# Patient Record
Sex: FEMALE | Race: WHITE | Employment: UNEMPLOYED | URBAN - METROPOLITAN AREA
[De-identification: names, ages, dates, MRNs, and addresses within clinical notes are randomized per-mention and may not be internally consistent; named-entity substitution may affect disease eponyms.]

---

## 2017-05-04 ENCOUNTER — TELEPHONE (OUTPATIENT)
Dept: OBGYN CLINIC | Facility: CLINIC | Age: 42
End: 2017-05-04

## 2017-05-04 NOTE — TELEPHONE ENCOUNTER
PN records received via fax from [address] [place] [address]. As of today pt is 16w6d with an JEANETTE of 10/13/17. When pt calls back, she needs to be scheduled for OBN ed appt. PN records placed in pile in old triage room.

## 2017-05-06 NOTE — TELEPHONE ENCOUNTER
The pt set up her OBN appt for 5/11/17. The pt is 17 week pregnant and says that she is having sharp pains at the top of abdomin. Please advise.

## 2017-05-06 NOTE — TELEPHONE ENCOUNTER
Pt has an OBN appt scheduled 5/11/17. Pt stating that sheis 17 week pregnant and says that she is having sharp pains at the top of her abdomen. Denies cramping, bleeding, nausea, vomiting and fever.   Informed pt to go to the ED for evaluation and f/u on

## 2017-05-11 ENCOUNTER — NURSE ONLY (OUTPATIENT)
Dept: OBGYN CLINIC | Facility: CLINIC | Age: 42
End: 2017-05-11

## 2017-05-11 DIAGNOSIS — Z34.92 ENCOUNTER FOR SUPERVISION OF NORMAL PREGNANCY IN SECOND TRIMESTER, UNSPECIFIED GRAVIDITY: Primary | ICD-10-CM

## 2017-05-11 NOTE — PROGRESS NOTES
Pt seen for OBN appt today with no complaints. Pt transferring care at 17 weeks with records. Pt conceived via IVF, will have records sent. 1 hour glucose ordered. NPN appt with MD scheduled. PN labs entered and reviewed by Shannan Dexter RN. listed above No    Previous miscarriages or stillborn Yes Pt has had multiple miscarriages and ectopic

## 2017-05-15 ENCOUNTER — INITIAL PRENATAL (OUTPATIENT)
Dept: OBGYN CLINIC | Facility: CLINIC | Age: 42
End: 2017-05-15

## 2017-05-15 VITALS — DIASTOLIC BLOOD PRESSURE: 63 MMHG | SYSTOLIC BLOOD PRESSURE: 101 MMHG | WEIGHT: 150 LBS | HEART RATE: 84 BPM

## 2017-05-15 DIAGNOSIS — Z34.92 ENCOUNTER FOR SUPERVISION OF NORMAL PREGNANCY IN SECOND TRIMESTER, UNSPECIFIED GRAVIDITY: Primary | ICD-10-CM

## 2017-05-15 PROBLEM — O34.219 PREVIOUS CESAREAN DELIVERY AFFECTING PREGNANCY (HCC): Status: ACTIVE | Noted: 2017-05-15

## 2017-05-15 PROBLEM — O09.529 AMA (ADVANCED MATERNAL AGE) MULTIGRAVIDA 35+: Status: ACTIVE | Noted: 2017-05-15

## 2017-05-15 PROBLEM — O09.529 AMA (ADVANCED MATERNAL AGE) MULTIGRAVIDA 35+ (HCC): Status: ACTIVE | Noted: 2017-05-15

## 2017-05-15 PROBLEM — O34.219 PREVIOUS CESAREAN DELIVERY AFFECTING PREGNANCY: Status: ACTIVE | Noted: 2017-05-15

## 2017-05-15 RX ORDER — PRENATAL VIT/IRON FUM/FOLIC AC 27MG-0.8MG
1 TABLET ORAL DAILY
COMMUNITY
End: 2019-02-28 | Stop reason: ALTCHOICE

## 2017-05-15 RX ORDER — PROGESTERONE 100 MG/1
INSERT VAGINAL
COMMUNITY
Start: 2017-03-02 | End: 2017-05-15

## 2017-05-15 RX ORDER — ESTRADIOL 0.1 MG/D
FILM, EXTENDED RELEASE TRANSDERMAL
COMMUNITY
Start: 2017-03-09 | End: 2017-05-15

## 2017-05-15 NOTE — PROGRESS NOTES
Devon at visit. She stays at home for care of [employer] with extensive travel. They had PGD and then NT / Frankie Dimas with previous practice. We discussed AM > 40 and also IVF with MFM testing.  She would prefer to do this in [facility].

## 2017-05-17 ENCOUNTER — TELEPHONE (OUTPATIENT)
Dept: PERINATAL CARE | Facility: HOSPITAL | Age: 42
End: 2017-05-17

## 2017-05-17 ENCOUNTER — TELEPHONE (OUTPATIENT)
Dept: OBGYN CLINIC | Facility: CLINIC | Age: 42
End: 2017-05-17

## 2017-05-17 NOTE — TELEPHONE ENCOUNTER
ARI. REQUEST HAS BEEN FAXED TO [facility]. PT NEEDS TO CALL THEM [phone] TO SCHEDULE HER APPT.

## 2017-05-17 NOTE — TELEPHONE ENCOUNTER
Patient needs [facility] MFM consult, Level 2, Fetal Echo and serial MFM growth US's. Diagnoses = AMA and IVF pregnancy. She will need to see MFM by 20 weeks ( end of next week ). Thanks.

## 2017-05-24 ENCOUNTER — APPOINTMENT (OUTPATIENT)
Dept: LAB | Facility: HOSPITAL | Age: 42
End: 2017-05-24
Attending: OBSTETRICS & GYNECOLOGY
Payer: COMMERCIAL

## 2017-05-24 DIAGNOSIS — Z34.92 ENCOUNTER FOR SUPERVISION OF NORMAL PREGNANCY IN SECOND TRIMESTER, UNSPECIFIED GRAVIDITY: ICD-10-CM

## 2017-05-24 PROCEDURE — 36415 COLL VENOUS BLD VENIPUNCTURE: CPT

## 2017-05-24 PROCEDURE — 82950 GLUCOSE TEST: CPT

## 2017-05-31 ENCOUNTER — HOSPITAL ENCOUNTER (OUTPATIENT)
Dept: PERINATAL CARE | Facility: HOSPITAL | Age: 42
Discharge: HOME | End: 2017-05-31
Attending: OBSTETRICS & GYNECOLOGY
Payer: COMMERCIAL

## 2017-05-31 VITALS
RESPIRATION RATE: 16 BRPM | DIASTOLIC BLOOD PRESSURE: 60 MMHG | HEART RATE: 77 BPM | WEIGHT: 150 LBS | SYSTOLIC BLOOD PRESSURE: 112 MMHG

## 2017-05-31 DIAGNOSIS — O09.812 PREGNANCY RESULTING FROM IN VITRO FERTILIZATION IN SECOND TRIMESTER: ICD-10-CM

## 2017-05-31 DIAGNOSIS — O09.522 AMA (ADVANCED MATERNAL AGE) MULTIGRAVIDA 35+, SECOND TRIMESTER: ICD-10-CM

## 2017-05-31 DIAGNOSIS — O34.219 PREVIOUS CESAREAN DELIVERY AFFECTING PREGNANCY: ICD-10-CM

## 2017-05-31 DIAGNOSIS — O09.522 AMA (ADVANCED MATERNAL AGE) MULTIGRAVIDA 35+, SECOND TRIMESTER: Primary | ICD-10-CM

## 2017-05-31 PROCEDURE — 76811 OB US DETAILED SNGL FETUS: CPT | Performed by: OBSTETRICS & GYNECOLOGY

## 2017-05-31 PROCEDURE — 99243 OFF/OP CNSLTJ NEW/EST LOW 30: CPT | Performed by: OBSTETRICS & GYNECOLOGY

## 2017-05-31 NOTE — PROGRESS NOTES
Pt for level 2 U/S  Hx AMA and IVF  Low risk harmony male fetus  Denies pregnancy complaints  States fetal; mvmt present

## 2017-05-31 NOTE — PROGRESS NOTES
Outpatient Maternal-Fetal Medicine Consultation    Dear Dr. Charley Valladares,    Thank you for requesting ultrasound evaluation and maternal fetal medicine consultation on your patient Francesco Gilbert.   As you are aware she is a 35year old female with a Monk p 16  Wt 150 lb (68.04 kg)  LMP 12/26/2016  General: alert and oriented,no acute distress  Abdomen: gravid, soft, non-tender  Extremities: non-tender, no edema    OBSTETRIC ULTRASOUND  A level II ultrasound was performed prior to the consultation which I int 25.    Invasive Testing  I offered invasive genetic testing (amniocentesis, chorionic villus sampling) after reviewing the diagnostic accuracy of these tests as well as the procedure associated loss rate (1:500 for genetic amniocentesis).     She ultimately

## 2017-06-23 ENCOUNTER — HOSPITAL ENCOUNTER (OUTPATIENT)
Dept: PERINATAL CARE | Facility: HOSPITAL | Age: 42
Discharge: HOME | End: 2017-06-23
Attending: OBSTETRICS & GYNECOLOGY
Payer: COMMERCIAL

## 2017-06-23 VITALS — HEART RATE: 90 BPM | DIASTOLIC BLOOD PRESSURE: 44 MMHG | SYSTOLIC BLOOD PRESSURE: 113 MMHG

## 2017-06-23 DIAGNOSIS — O09.522 AMA (ADVANCED MATERNAL AGE) MULTIGRAVIDA 35+, SECOND TRIMESTER: ICD-10-CM

## 2017-06-23 DIAGNOSIS — O34.219 PREVIOUS CESAREAN DELIVERY AFFECTING PREGNANCY: ICD-10-CM

## 2017-06-23 DIAGNOSIS — O09.812 PREGNANCY RESULTING FROM ASSISTED REPRODUCTIVE TECHNOLOGY, SECOND TRIMESTER: Primary | ICD-10-CM

## 2017-06-23 PROBLEM — O09.819 PREGNANCY RESULTING FROM ASSISTED REPRODUCTIVE TECHNOLOGY: Status: ACTIVE | Noted: 2017-06-23

## 2017-06-23 PROBLEM — O09.819 PREGNANCY RESULTING FROM ASSISTED REPRODUCTIVE TECHNOLOGY (HCC): Status: ACTIVE | Noted: 2017-06-23

## 2017-06-23 PROCEDURE — 76827 ECHO EXAM OF FETAL HEART: CPT | Performed by: OBSTETRICS & GYNECOLOGY

## 2017-06-23 PROCEDURE — 76825 ECHO EXAM OF FETAL HEART: CPT | Performed by: OBSTETRICS & GYNECOLOGY

## 2017-06-23 PROCEDURE — 93325 DOPPLER ECHO COLOR FLOW MAPG: CPT | Performed by: OBSTETRICS & GYNECOLOGY

## 2017-06-23 PROCEDURE — 99214 OFFICE O/P EST MOD 30 MIN: CPT | Performed by: OBSTETRICS & GYNECOLOGY

## 2017-06-23 NOTE — PROGRESS NOTES
Outpatient Maternal-Fetal Medicine Consultation    Dear Dr. Amparo Sosa,    Thank you for requesting ultrasound evaluation and maternal fetal medicine consultation on your patient Homero Turk As you are aware she is a 35year old female with a Monk p appears to be at increased risk of delivering offspring with congenital malformations compared with fertile women who conceive naturally. Heart defects have been reported as high at 6 % so fetal echocardiography is recommended in all IVF patients.  Nallely Rahman diagnostic accuracy of these tests as well as the procedure associated loss rate (1:500 for genetic amniocentesis). She ultimately does not desire invasive genetic testing. She had pre-implantation screening, NT screen and NIPT - declined amniocentesis.

## 2017-06-28 ENCOUNTER — ROUTINE PRENATAL (OUTPATIENT)
Dept: OBGYN CLINIC | Facility: CLINIC | Age: 42
End: 2017-06-28

## 2017-06-28 VITALS
WEIGHT: 154 LBS | DIASTOLIC BLOOD PRESSURE: 62 MMHG | SYSTOLIC BLOOD PRESSURE: 100 MMHG | BODY MASS INDEX: 28.34 KG/M2 | HEART RATE: 85 BPM | HEIGHT: 62 IN

## 2017-06-28 DIAGNOSIS — Z34.92 ENCOUNTER FOR SUPERVISION OF NORMAL PREGNANCY IN SECOND TRIMESTER, UNSPECIFIED GRAVIDITY: Primary | ICD-10-CM

## 2017-07-06 ENCOUNTER — OFFICE VISIT (OUTPATIENT)
Dept: INTERNAL MEDICINE CLINIC | Facility: CLINIC | Age: 42
End: 2017-07-06

## 2017-07-06 VITALS
TEMPERATURE: 99 F | HEIGHT: 62 IN | HEART RATE: 90 BPM | DIASTOLIC BLOOD PRESSURE: 55 MMHG | SYSTOLIC BLOOD PRESSURE: 91 MMHG | WEIGHT: 156 LBS | BODY MASS INDEX: 28.71 KG/M2

## 2017-07-06 DIAGNOSIS — R05.9 COUGH: ICD-10-CM

## 2017-07-06 DIAGNOSIS — J02.9 SORE THROAT: Primary | ICD-10-CM

## 2017-07-06 DIAGNOSIS — R09.81 SINUS CONGESTION: ICD-10-CM

## 2017-07-06 LAB
CONTROL LINE PRESENT WITH A CLEAR BACKGROUND (YES/NO): YES YES/NO
KIT LOT #: NORMAL NUMERIC
STREP GRP A CUL-SCR: NEGATIVE

## 2017-07-06 PROCEDURE — 99212 OFFICE O/P EST SF 10 MIN: CPT | Performed by: INTERNAL MEDICINE

## 2017-07-06 PROCEDURE — 87880 STREP A ASSAY W/OPTIC: CPT | Performed by: INTERNAL MEDICINE

## 2017-07-06 PROCEDURE — 99203 OFFICE O/P NEW LOW 30 MIN: CPT | Performed by: INTERNAL MEDICINE

## 2017-07-06 RX ORDER — MOMETASONE 50 UG/1
1 SPRAY, METERED NASAL DAILY
Qty: 1 BOTTLE | Refills: 0 | Status: SHIPPED | OUTPATIENT
Start: 2017-07-06 | End: 2017-08-21

## 2017-07-06 NOTE — PATIENT INSTRUCTIONS
1.  Take nasal spray as prescribed. 2.  Take Claritin daily for the next 7-10 days and reevaluate effectiveness. When You Have a Sore Throat  A sore throat can be painful. There are many reasons why you may have a sore throat.  Your healthcare provider wi the exam, your healthcare provider checks your ears, nose, and throat for problems.  He or she also checks for swelling in the neck, and may listen to your chest.  Possible tests  Other tests your healthcare provider may perform include:  · A throat swab to produce bacteria that are harder to kill. Your healthcare provider will prescribe antibiotics only if he or she thinks they are likely to help. If antibiotics are prescribed  Take the medicine exactly as directed.  Be sure to finish your prescription even

## 2017-07-06 NOTE — PROGRESS NOTES
Patient ID: Henny Tello is a 38year old female. Patient presents with:  Sore Throat: Prenatal pt complains of Throat pain that began 11 days ago, requesting a strep test.       HISTORY OF PRESENT ILLNESS:   HPI  Patient presents for above.   Having co Concern   None on file     Social History Narrative   None on file           PHYSICAL EXAM:      07/06/17  1541   BP: 91/55   Pulse: 90   Temp: 98.5 °F (36.9 °C)   TempSrc: Oral   Weight: 156 lb (70.8 kg)   Height: 5' 2\" (1.575 m)       Body mass index is daily.  Dispense: 1 Bottle; Refill: 0  · As per #1. Return in about 1 week (around 7/13/2017).     Cheli Marion MD  7/6/2017

## 2017-07-19 ENCOUNTER — TELEPHONE (OUTPATIENT)
Dept: INTERNAL MEDICINE CLINIC | Facility: CLINIC | Age: 42
End: 2017-07-19

## 2017-07-19 ENCOUNTER — TELEPHONE (OUTPATIENT)
Dept: PEDIATRICS CLINIC | Facility: CLINIC | Age: 42
End: 2017-07-19

## 2017-07-19 NOTE — TELEPHONE ENCOUNTER
Reviewed Dr Jose A Jarvis orders with pt. She has already done all the home remedies without improvement. She scheduled appt with Dr Estevan Kaplan on 7/20/17 @ 3:20pm [facility] for further evaluation.

## 2017-07-19 NOTE — TELEPHONE ENCOUNTER
Patient can take Tylenol for the pain. Also salt water gargles. She can drink hot tea with honey. Make appointment if not better. Hesitate to use any other prescription medication based on pregnancy and lack of evidence of bacterial infection.

## 2017-07-19 NOTE — TELEPHONE ENCOUNTER
LEFT MESSAGE THAT THE TESTS SHOULD BE DONE AT 28 WEEKS WHICH IS 2 DAYS FROM NOW SO REASSURED HER ANYTIME THIS WEEK IS PERFECT.

## 2017-07-19 NOTE — TELEPHONE ENCOUNTER
Actions Requested: Further recommendations/treatments. To on call for Dr. Kiah Pike  Problem: sore throat, congestion  Onset and Timing: 3 weeks   Associated Symptoms: Pt reports sore throat, congestion. Denies sinus pain or fever  Aggravating by:   Alleviated

## 2017-07-20 ENCOUNTER — OFFICE VISIT (OUTPATIENT)
Dept: INTERNAL MEDICINE CLINIC | Facility: CLINIC | Age: 42
End: 2017-07-20

## 2017-07-20 VITALS
HEART RATE: 80 BPM | HEIGHT: 62 IN | DIASTOLIC BLOOD PRESSURE: 59 MMHG | RESPIRATION RATE: 18 BRPM | WEIGHT: 156.38 LBS | BODY MASS INDEX: 28.78 KG/M2 | TEMPERATURE: 99 F | SYSTOLIC BLOOD PRESSURE: 100 MMHG

## 2017-07-20 DIAGNOSIS — J02.9 PHARYNGITIS, UNSPECIFIED ETIOLOGY: Primary | ICD-10-CM

## 2017-07-20 PROCEDURE — 99212 OFFICE O/P EST SF 10 MIN: CPT | Performed by: INTERNAL MEDICINE

## 2017-07-20 PROCEDURE — 99213 OFFICE O/P EST LOW 20 MIN: CPT | Performed by: INTERNAL MEDICINE

## 2017-07-20 RX ORDER — AZITHROMYCIN 250 MG/1
TABLET, FILM COATED ORAL
Qty: 6 TABLET | Refills: 0 | Status: SHIPPED | OUTPATIENT
Start: 2017-07-20 | End: 2017-08-21 | Stop reason: ALTCHOICE

## 2017-07-20 NOTE — PROGRESS NOTES
HPI:    Patient ID: Getachew Kunz is a 38year old female. She has been sick for 4 weeks. She saw Dr. Kristi Atwood and was given Flonase. She didn't think it was helping. URI    This is a new problem. The current episode started 1 to 4 weeks ago.  The p

## 2017-07-21 ENCOUNTER — TELEPHONE (OUTPATIENT)
Dept: OBGYN CLINIC | Facility: CLINIC | Age: 42
End: 2017-07-21

## 2017-07-21 NOTE — TELEPHONE ENCOUNTER
Pt notified Z pack is category B med and it is okay to take during pregnancy. Pt states she had one episode of diarrhea 3 hours after she took the first dose. Pt advised to notify her PCP if diarrhea continues.

## 2017-07-24 ENCOUNTER — APPOINTMENT (OUTPATIENT)
Dept: LAB | Facility: HOSPITAL | Age: 42
End: 2017-07-24
Attending: OBSTETRICS & GYNECOLOGY
Payer: COMMERCIAL

## 2017-07-24 ENCOUNTER — HOSPITAL ENCOUNTER (OUTPATIENT)
Dept: PERINATAL CARE | Facility: HOSPITAL | Age: 42
Discharge: HOME | End: 2017-07-24
Attending: OBSTETRICS & GYNECOLOGY
Payer: COMMERCIAL

## 2017-07-24 VITALS — SYSTOLIC BLOOD PRESSURE: 106 MMHG | HEART RATE: 82 BPM | DIASTOLIC BLOOD PRESSURE: 47 MMHG

## 2017-07-24 DIAGNOSIS — O09.813 PREGNANCY RESULTING FROM ASSISTED REPRODUCTIVE TECHNOLOGY, THIRD TRIMESTER: ICD-10-CM

## 2017-07-24 DIAGNOSIS — Z34.92 ENCOUNTER FOR SUPERVISION OF NORMAL PREGNANCY IN SECOND TRIMESTER, UNSPECIFIED GRAVIDITY: ICD-10-CM

## 2017-07-24 DIAGNOSIS — O09.523 AMA (ADVANCED MATERNAL AGE) MULTIGRAVIDA 35+, THIRD TRIMESTER: ICD-10-CM

## 2017-07-24 DIAGNOSIS — O09.523 AMA (ADVANCED MATERNAL AGE) MULTIGRAVIDA 35+, THIRD TRIMESTER: Primary | ICD-10-CM

## 2017-07-24 LAB
ERYTHROCYTE [DISTWIDTH] IN BLOOD BY AUTOMATED COUNT: 13.6 % (ref 11–15)
GLUCOSE 1H P 50 G GLC PO SERPL-MCNC: 123 MG/DL
HCT VFR BLD AUTO: 37 % (ref 35–48)
HGB BLD-MCNC: 12.5 G/DL (ref 12–16)
MCH RBC QN AUTO: 32.2 PG (ref 27–32)
MCHC RBC AUTO-ENTMCNC: 33.7 G/DL (ref 32–37)
MCV RBC AUTO: 95.4 FL (ref 80–100)
PLATELET # BLD AUTO: 208 K/UL (ref 140–400)
PMV BLD AUTO: 9.2 FL (ref 7.4–10.3)
RBC # BLD AUTO: 3.87 M/UL (ref 3.7–5.4)
WBC # BLD AUTO: 8.5 K/UL (ref 4–11)

## 2017-07-24 PROCEDURE — 99213 OFFICE O/P EST LOW 20 MIN: CPT | Performed by: OBSTETRICS & GYNECOLOGY

## 2017-07-24 PROCEDURE — 76805 OB US >/= 14 WKS SNGL FETUS: CPT | Performed by: OBSTETRICS & GYNECOLOGY

## 2017-07-24 PROCEDURE — 85027 COMPLETE CBC AUTOMATED: CPT

## 2017-07-24 PROCEDURE — 36415 COLL VENOUS BLD VENIPUNCTURE: CPT

## 2017-07-24 PROCEDURE — 82950 GLUCOSE TEST: CPT

## 2017-07-24 NOTE — PROGRESS NOTES
Outpatient Maternal-Fetal Medicine Consultation    Dear Dr. Milena Nixon,    Thank you for requesting ultrasound evaluation and maternal fetal medicine consultation on your patient Aarti Lucas As you are aware she is a 35year old female with a Monk p   · AMA    RECOMMENDATIONS:  · Continue care with Dr. Amparo Sosa  · Monthly Growth ultrasounds   · Weekly NST's at 32 weeks  · Twice weekly testing at 36 weeks  · weekly NST and weekly BPP or  · twice weekly NST with weekly BHANU  · Delivery at 39 week

## 2017-07-24 NOTE — PROGRESS NOTES
Pt here for level 2 ultrasound  AMA  Hx IVF  Denies pregnancy complaints and states feeling fetal movement

## 2017-07-28 ENCOUNTER — ROUTINE PRENATAL (OUTPATIENT)
Dept: OBGYN CLINIC | Facility: CLINIC | Age: 42
End: 2017-07-28

## 2017-07-28 VITALS
SYSTOLIC BLOOD PRESSURE: 105 MMHG | BODY MASS INDEX: 29 KG/M2 | HEART RATE: 91 BPM | WEIGHT: 157.81 LBS | DIASTOLIC BLOOD PRESSURE: 63 MMHG

## 2017-07-28 DIAGNOSIS — Z34.93 ENCOUNTER FOR SUPERVISION OF NORMAL PREGNANCY IN THIRD TRIMESTER, UNSPECIFIED GRAVIDITY: Primary | ICD-10-CM

## 2017-07-28 LAB
MULTISTIX LOT#: NORMAL NUMERIC
PH, URINE: 7 (ref 4.5–8)
SPECIFIC GRAVITY: 1.01 (ref 1–1.03)
UROBILINOGEN,SEMI-QN: 0.2 MG/DL (ref 0–1.9)

## 2017-07-28 NOTE — PROGRESS NOTES
Had episode of epigastric pain that was relieved with BM (diarrhea last night). No issues since. Growth 71%. RTC 2 wks.

## 2017-08-08 ENCOUNTER — TELEPHONE (OUTPATIENT)
Dept: OBGYN CLINIC | Facility: CLINIC | Age: 42
End: 2017-08-08

## 2017-08-14 ENCOUNTER — ROUTINE PRENATAL (OUTPATIENT)
Dept: OBGYN CLINIC | Facility: CLINIC | Age: 42
End: 2017-08-14

## 2017-08-14 VITALS
DIASTOLIC BLOOD PRESSURE: 57 MMHG | HEART RATE: 86 BPM | BODY MASS INDEX: 29 KG/M2 | WEIGHT: 158.81 LBS | SYSTOLIC BLOOD PRESSURE: 103 MMHG

## 2017-08-14 DIAGNOSIS — Z34.93 ENCOUNTER FOR SUPERVISION OF NORMAL PREGNANCY IN THIRD TRIMESTER, UNSPECIFIED GRAVIDITY: Primary | ICD-10-CM

## 2017-08-14 NOTE — PROGRESS NOTES
Wishes for rCSx now -- uncertain re: which exact date since prefers M[facility]NEZ (friend recommendation). Undecided re: BTL. Has appt for MFM next week for next growth. Has appts for NSTs already set up.  RTC 2 wks

## 2017-08-16 ENCOUNTER — TELEPHONE (OUTPATIENT)
Dept: PEDIATRICS CLINIC | Facility: CLINIC | Age: 42
End: 2017-08-16

## 2017-08-16 NOTE — TELEPHONE ENCOUNTER
Pt requesting to speak directly to MART[facility]NEZ. Pt informed MARTÍNEZ is out of clinic this week and offered to be of any assistance to pt. Pt states she will talk to MARTÍNEZ at her next PN appt. Assisted pt with scheduling appt with MARTÍNEZ on 8/29/17.

## 2017-08-16 NOTE — TELEPHONE ENCOUNTER
Pt states she has a few questions regarding her upcoming appts want to speak to dr before olga are rescheduled

## 2017-08-21 ENCOUNTER — HOSPITAL ENCOUNTER (OUTPATIENT)
Dept: PERINATAL CARE | Facility: HOSPITAL | Age: 42
Discharge: HOME | End: 2017-08-21
Attending: OBSTETRICS & GYNECOLOGY
Payer: COMMERCIAL

## 2017-08-21 VITALS — SYSTOLIC BLOOD PRESSURE: 119 MMHG | HEART RATE: 82 BPM | DIASTOLIC BLOOD PRESSURE: 64 MMHG

## 2017-08-21 DIAGNOSIS — O09.523 ELDERLY MULTIGRAVIDA IN THIRD TRIMESTER: ICD-10-CM

## 2017-08-21 DIAGNOSIS — O09.813 PREGNANCY RESULTING FROM ASSISTED REPRODUCTIVE TECHNOLOGY IN THIRD TRIMESTER: Primary | ICD-10-CM

## 2017-08-21 DIAGNOSIS — O09.813 PREGNANCY RESULTING FROM ASSISTED REPRODUCTIVE TECHNOLOGY IN THIRD TRIMESTER: ICD-10-CM

## 2017-08-21 PROCEDURE — 76805 OB US >/= 14 WKS SNGL FETUS: CPT | Performed by: OBSTETRICS & GYNECOLOGY

## 2017-08-21 PROCEDURE — 76819 FETAL BIOPHYS PROFIL W/O NST: CPT | Performed by: OBSTETRICS & GYNECOLOGY

## 2017-08-21 PROCEDURE — 99212 OFFICE O/P EST SF 10 MIN: CPT | Performed by: OBSTETRICS & GYNECOLOGY

## 2017-08-21 NOTE — PROGRESS NOTES
Pt here for growth ultrasound and BPP  AMA   IVF  Hx of anxiety  Hx of fibroids  Denies pregnancy complaints and states feeling fetal movement  C/o diarrhea x three this week

## 2017-08-21 NOTE — PROGRESS NOTES
Outpatient Maternal-Fetal Medicine Consultation    Dear Dr. Tony Mcknight,    Thank you for requesting ultrasound evaluation and maternal fetal medicine consultation on your patient Lear Ha As you are aware she is a 35year old female with a Monk p with dates  · IVF with PGS  · H/o   · AMA    RECOMMENDATIONS:  · Continue care with Dr. Dianna Zaragoza  · Monthly Growth ultrasounds   · Weekly NST's   · Twice weekly testing at 36 weeks  · weekly NST and weekly BPP or  · twice weekly NST with weekly BHANU

## 2017-08-21 NOTE — ADDENDUM NOTE
Encounter addended by: Tanika Dumont on: 8/21/2017 12:24 PM<BR>    Actions taken: Charge Capture section accepted

## 2017-08-23 ENCOUNTER — TELEPHONE (OUTPATIENT)
Dept: OBGYN CLINIC | Facility: CLINIC | Age: 42
End: 2017-08-23

## 2017-08-24 ENCOUNTER — APPOINTMENT (OUTPATIENT)
Dept: OBGYN CLINIC | Facility: HOSPITAL | Age: 42
End: 2017-08-24
Payer: COMMERCIAL

## 2017-08-24 ENCOUNTER — HOSPITAL ENCOUNTER (OUTPATIENT)
Facility: HOSPITAL | Age: 42
Discharge: HOME | End: 2017-08-24
Attending: OBSTETRICS & GYNECOLOGY | Admitting: OBSTETRICS & GYNECOLOGY
Payer: COMMERCIAL

## 2017-08-24 VITALS — SYSTOLIC BLOOD PRESSURE: 114 MMHG | DIASTOLIC BLOOD PRESSURE: 46 MMHG | HEART RATE: 93 BPM

## 2017-08-24 PROCEDURE — 59025 FETAL NON-STRESS TEST: CPT | Performed by: OBSTETRICS & GYNECOLOGY

## 2017-08-24 NOTE — NST
Nonstress Test   Patient: Henny Tello    Gestation: 32w6d    NST: AMA       Variability: Moderate           Accelerations: Yes           Decelerations: None            Baseline: 130 BPM           Uterine Irritability: No           Contractions: Not pres

## 2017-08-29 ENCOUNTER — ROUTINE PRENATAL (OUTPATIENT)
Dept: OBGYN CLINIC | Facility: CLINIC | Age: 42
End: 2017-08-29

## 2017-08-29 VITALS
DIASTOLIC BLOOD PRESSURE: 62 MMHG | BODY MASS INDEX: 29 KG/M2 | WEIGHT: 159.19 LBS | HEART RATE: 81 BPM | SYSTOLIC BLOOD PRESSURE: 104 MMHG

## 2017-08-29 DIAGNOSIS — Z34.93 ENCOUNTER FOR SUPERVISION OF NORMAL PREGNANCY IN THIRD TRIMESTER, UNSPECIFIED GRAVIDITY: Primary | ICD-10-CM

## 2017-08-29 LAB
MULTISTIX LOT#: NORMAL NUMERIC
PH, URINE: 7 (ref 4.5–8)
SPECIFIC GRAVITY: 1 (ref 1–1.03)
UROBILINOGEN,SEMI-QN: 0 MG/DL (ref 0–1.9)

## 2017-08-29 PROCEDURE — 90715 TDAP VACCINE 7 YRS/> IM: CPT | Performed by: OBSTETRICS & GYNECOLOGY

## 2017-08-29 PROCEDURE — 90471 IMMUNIZATION ADMIN: CPT | Performed by: OBSTETRICS & GYNECOLOGY

## 2017-08-29 NOTE — PROGRESS NOTES
Tdap vaccine administered to pts left deltoid. Pt tolerated well. VIS sheet in regards to tdap given to pt and encouraged to call with any questions or concerns.

## 2017-08-30 ENCOUNTER — TELEPHONE (OUTPATIENT)
Dept: OBGYN CLINIC | Facility: CLINIC | Age: 42
End: 2017-08-30

## 2017-08-30 DIAGNOSIS — Z01.818 PRE-OP TESTING: Primary | ICD-10-CM

## 2017-08-30 NOTE — TELEPHONE ENCOUNTER
OB GYN SURGICAL SCHEDULING    Assessment: Previous  section    Pre-Operative Procedure:  Repeat     Side: neither    In / on: 10-10-17 afternoon- prefer 1300    Date:  10-10-17    Admission:  AM Admit    Anesthesia: Spinal    Additional Or

## 2017-08-30 NOTE — TELEPHONE ENCOUNTER
Please enter the appropriate orders for Patient is scheduled 10/10/17 at 1:30pm MART[facility]NEZ/Norman Stinson rp .

## 2017-08-30 NOTE — TELEPHONE ENCOUNTER
Lmtcb. Please relay info:    Patient is scheduled 10/10/17 at 1:30pm repeat  Dr. Iman Wu rpajith requested. Pat orders routed. Instructions routed via [employer].

## 2017-09-01 ENCOUNTER — APPOINTMENT (OUTPATIENT)
Dept: OBGYN CLINIC | Facility: HOSPITAL | Age: 42
End: 2017-09-01
Payer: COMMERCIAL

## 2017-09-01 ENCOUNTER — HOSPITAL ENCOUNTER (OUTPATIENT)
Facility: HOSPITAL | Age: 42
Discharge: HOME | End: 2017-09-01
Attending: OBSTETRICS & GYNECOLOGY | Admitting: OBSTETRICS & GYNECOLOGY
Payer: COMMERCIAL

## 2017-09-01 PROCEDURE — 59025 FETAL NON-STRESS TEST: CPT | Performed by: OBSTETRICS & GYNECOLOGY

## 2017-09-01 NOTE — NST
Nonstress Test   Patient: Gaston Duval    Gestation: 34w0d    NST:AMA, IVG       Variability: Moderate           Accelerations: Yes           Decelerations: None            Baseline: 125 BPM           Uterine Irritability: No           Contractions: Not

## 2017-09-08 ENCOUNTER — HOSPITAL ENCOUNTER (OUTPATIENT)
Dept: OBGYN CLINIC | Facility: HOSPITAL | Age: 42
Discharge: HOME | End: 2017-09-08
Payer: COMMERCIAL

## 2017-09-08 ENCOUNTER — HOSPITAL ENCOUNTER (OUTPATIENT)
Facility: HOSPITAL | Age: 42
Discharge: HOME | End: 2017-09-08
Attending: OBSTETRICS & GYNECOLOGY | Admitting: OBSTETRICS & GYNECOLOGY
Payer: COMMERCIAL

## 2017-09-08 PROCEDURE — 59025 FETAL NON-STRESS TEST: CPT | Performed by: OBSTETRICS & GYNECOLOGY

## 2017-09-08 NOTE — NST
Nonstress Test   Patient: Morgan Pair    Gestation: 35w0d    NST:ama       Variability: Moderate           Accelerations: Yes           Decelerations: None            Baseline: 135 BPM           Uterine Irritability: No           Contractions: Irregular

## 2017-09-12 ENCOUNTER — ROUTINE PRENATAL (OUTPATIENT)
Dept: OBGYN CLINIC | Facility: CLINIC | Age: 42
End: 2017-09-12

## 2017-09-12 VITALS
BODY MASS INDEX: 29 KG/M2 | DIASTOLIC BLOOD PRESSURE: 66 MMHG | HEART RATE: 87 BPM | SYSTOLIC BLOOD PRESSURE: 105 MMHG | WEIGHT: 160 LBS

## 2017-09-12 DIAGNOSIS — Z34.93 ENCOUNTER FOR SUPERVISION OF NORMAL PREGNANCY IN THIRD TRIMESTER, UNSPECIFIED GRAVIDITY: Primary | ICD-10-CM

## 2017-09-12 LAB
MULTISTIX LOT#: NORMAL NUMERIC
PH, URINE: 7.5 (ref 4.5–8)
SPECIFIC GRAVITY: 1.01 (ref 1–1.03)

## 2017-09-15 ENCOUNTER — HOSPITAL ENCOUNTER (OUTPATIENT)
Dept: OBGYN CLINIC | Facility: HOSPITAL | Age: 42
Discharge: HOME | End: 2017-09-15
Payer: COMMERCIAL

## 2017-09-15 ENCOUNTER — HOSPITAL ENCOUNTER (OUTPATIENT)
Facility: HOSPITAL | Age: 42
Discharge: HOME | End: 2017-09-15
Attending: OBSTETRICS & GYNECOLOGY | Admitting: OBSTETRICS & GYNECOLOGY
Payer: COMMERCIAL

## 2017-09-15 PROCEDURE — 59025 FETAL NON-STRESS TEST: CPT | Performed by: OBSTETRICS & GYNECOLOGY

## 2017-09-15 NOTE — NST
Nonstress Test   Patient: Benjamín Haro    Gestation: 36w0d    NST:ama, IVF     Variability: Moderate           Accelerations: Yes           Decelerations: None            Baseline: 125 BPM           Uterine Irritability: No           Contractions: Not pr

## 2017-09-18 ENCOUNTER — HOSPITAL ENCOUNTER (OUTPATIENT)
Dept: PERINATAL CARE | Facility: HOSPITAL | Age: 42
Discharge: HOME | End: 2017-09-18
Attending: OBSTETRICS & GYNECOLOGY
Payer: COMMERCIAL

## 2017-09-18 ENCOUNTER — OFFICE VISIT (OUTPATIENT)
Dept: OBGYN CLINIC | Facility: CLINIC | Age: 42
End: 2017-09-18

## 2017-09-18 ENCOUNTER — APPOINTMENT (OUTPATIENT)
Dept: LAB | Facility: HOSPITAL | Age: 42
End: 2017-09-18
Attending: OBSTETRICS & GYNECOLOGY
Payer: COMMERCIAL

## 2017-09-18 VITALS
SYSTOLIC BLOOD PRESSURE: 97 MMHG | HEART RATE: 96 BPM | DIASTOLIC BLOOD PRESSURE: 62 MMHG | WEIGHT: 160 LBS | BODY MASS INDEX: 29 KG/M2

## 2017-09-18 VITALS — DIASTOLIC BLOOD PRESSURE: 66 MMHG | HEART RATE: 92 BPM | SYSTOLIC BLOOD PRESSURE: 117 MMHG

## 2017-09-18 DIAGNOSIS — Z34.93 ENCOUNTER FOR SUPERVISION OF NORMAL PREGNANCY IN THIRD TRIMESTER, UNSPECIFIED GRAVIDITY: ICD-10-CM

## 2017-09-18 DIAGNOSIS — Z34.93 ENCOUNTER FOR SUPERVISION OF NORMAL PREGNANCY IN THIRD TRIMESTER, UNSPECIFIED GRAVIDITY: Primary | ICD-10-CM

## 2017-09-18 DIAGNOSIS — O09.813 PREGNANCY RESULTING FROM ASSISTED REPRODUCTIVE TECHNOLOGY IN THIRD TRIMESTER: ICD-10-CM

## 2017-09-18 DIAGNOSIS — O09.523 ELDERLY MULTIGRAVIDA IN THIRD TRIMESTER: Primary | ICD-10-CM

## 2017-09-18 DIAGNOSIS — O09.523 ELDERLY MULTIGRAVIDA IN THIRD TRIMESTER: ICD-10-CM

## 2017-09-18 LAB
ERYTHROCYTE [DISTWIDTH] IN BLOOD BY AUTOMATED COUNT: 13 % (ref 11–15)
HCT VFR BLD AUTO: 36.2 % (ref 35–48)
HGB BLD-MCNC: 12.1 G/DL (ref 12–16)
MCH RBC QN AUTO: 32.1 PG (ref 27–32)
MCHC RBC AUTO-ENTMCNC: 33.5 G/DL (ref 32–37)
MCV RBC AUTO: 95.9 FL (ref 80–100)
MULTISTIX LOT#: NORMAL NUMERIC
PH, URINE: 7 (ref 4.5–8)
PLATELET # BLD AUTO: 200 K/UL (ref 140–400)
PMV BLD AUTO: 9.3 FL (ref 7.4–10.3)
RBC # BLD AUTO: 3.78 M/UL (ref 3.7–5.4)
SPECIFIC GRAVITY: 1.01 (ref 1–1.03)
UROBILINOGEN,SEMI-QN: 0.2 MG/DL (ref 0–1.9)
WBC # BLD AUTO: 9.9 K/UL (ref 4–11)

## 2017-09-18 PROCEDURE — 85027 COMPLETE CBC AUTOMATED: CPT

## 2017-09-18 PROCEDURE — 86780 TREPONEMA PALLIDUM: CPT

## 2017-09-18 PROCEDURE — 76805 OB US >/= 14 WKS SNGL FETUS: CPT | Performed by: OBSTETRICS & GYNECOLOGY

## 2017-09-18 PROCEDURE — 76819 FETAL BIOPHYS PROFIL W/O NST: CPT | Performed by: OBSTETRICS & GYNECOLOGY

## 2017-09-18 PROCEDURE — 99213 OFFICE O/P EST LOW 20 MIN: CPT | Performed by: OBSTETRICS & GYNECOLOGY

## 2017-09-18 PROCEDURE — 36415 COLL VENOUS BLD VENIPUNCTURE: CPT

## 2017-09-18 NOTE — ADDENDUM NOTE
Encounter addended by: Sugar Agustin on: 9/18/2017  1:36 PM<BR>    Actions taken: Charge Capture section accepted

## 2017-09-18 NOTE — PROGRESS NOTES
Pt here for growth ultrasound and BPP  AMA  IVF  Denies pregnancy complaints and states feeling fetal movement

## 2017-09-18 NOTE — PROGRESS NOTES
Outpatient Maternal-Fetal Medicine Consultation    Dear Dr. Jaswinder Jensen,    Thank you for requesting ultrasound evaluation and maternal fetal medicine consultation on your patient Faustina Kussmaul As you are aware she is a 35year old female with a Monk p IMPRESSION:  · IUP at 36w3d   · Scan consistent with dates  · IVF with PGS  · H/o   · AMA    RECOMMENDATIONS:  · Continue care with Dr. Alli Ndiaye  · Twice weekly testing   · weekly NST and weekly BPP or  · twice weekly NST with weekly BHANU

## 2017-09-19 ENCOUNTER — HOSPITAL ENCOUNTER (OUTPATIENT)
Facility: HOSPITAL | Age: 42
Discharge: HOME | End: 2017-09-19
Attending: OBSTETRICS & GYNECOLOGY | Admitting: OBSTETRICS & GYNECOLOGY
Payer: COMMERCIAL

## 2017-09-19 ENCOUNTER — APPOINTMENT (OUTPATIENT)
Dept: OBGYN CLINIC | Facility: HOSPITAL | Age: 42
End: 2017-09-19
Payer: COMMERCIAL

## 2017-09-19 VITALS — HEART RATE: 99 BPM | SYSTOLIC BLOOD PRESSURE: 117 MMHG | DIASTOLIC BLOOD PRESSURE: 57 MMHG

## 2017-09-19 PROCEDURE — 59025 FETAL NON-STRESS TEST: CPT | Performed by: OBSTETRICS & GYNECOLOGY

## 2017-09-19 NOTE — NST
Nonstress Test   Patient: Alvino Auguste    Gestation: 36w4d    NST: AMA, IVF       Variability: Moderate           Accelerations: Yes           Decelerations: None            Baseline: 130 BPM           Uterine Irritability: No           Contractions: Not

## 2017-09-20 LAB — T PALLIDUM AB SER QL: NEGATIVE

## 2017-09-20 NOTE — TELEPHONE ENCOUNTER
Spoke to patient and referred her to [facility] for detailed instructions, patient  Advised to call the office if she needed clarification.

## 2017-09-22 ENCOUNTER — HOSPITAL ENCOUNTER (OUTPATIENT)
Dept: OBGYN CLINIC | Facility: HOSPITAL | Age: 42
Discharge: HOME | End: 2017-09-22
Payer: COMMERCIAL

## 2017-09-22 ENCOUNTER — HOSPITAL ENCOUNTER (OUTPATIENT)
Facility: HOSPITAL | Age: 42
Discharge: HOME | End: 2017-09-22
Attending: OBSTETRICS & GYNECOLOGY | Admitting: OBSTETRICS & GYNECOLOGY
Payer: COMMERCIAL

## 2017-09-22 VITALS — DIASTOLIC BLOOD PRESSURE: 44 MMHG | SYSTOLIC BLOOD PRESSURE: 115 MMHG | HEART RATE: 87 BPM

## 2017-09-22 PROCEDURE — 59025 FETAL NON-STRESS TEST: CPT | Performed by: OBSTETRICS & GYNECOLOGY

## 2017-09-22 NOTE — NST
Nonstress Test   Patient: Bryan Reyes    Gestation: 37w0d    NST:       Variability: Moderate           Accelerations: Yes           Decelerations: None            Baseline: 135 BPM           Uterine Irritability: No           Contractions: Not present

## 2017-09-24 NOTE — PROGRESS NOTES
Outpatient Maternal-Fetal Medicine Consultation     Dear Dr. Jenny Sesay,     Thank you for requesting ultrasound evaluation and maternal fetal medicine consultation on your patient Cat Moncada As you are aware she is a 35year old female with a [place] rule out all structural and chromosomal abnormalities.        We reviewed the signs and symptoms of preeclampsia, labor and monitoring fetal movement. We discussed reasons for her to call her physician.     I explained the rationale for the NSTs.     SHERINE

## 2017-09-25 ENCOUNTER — HOSPITAL ENCOUNTER (OUTPATIENT)
Dept: PERINATAL CARE | Facility: HOSPITAL | Age: 42
Discharge: HOME | End: 2017-09-25
Attending: OBSTETRICS & GYNECOLOGY
Payer: COMMERCIAL

## 2017-09-25 VITALS
HEART RATE: 92 BPM | DIASTOLIC BLOOD PRESSURE: 73 MMHG | SYSTOLIC BLOOD PRESSURE: 112 MMHG | BODY MASS INDEX: 29.44 KG/M2 | WEIGHT: 160 LBS | HEIGHT: 62 IN

## 2017-09-25 DIAGNOSIS — O09.813 PREGNANCY RESULTING FROM ASSISTED REPRODUCTIVE TECHNOLOGY IN THIRD TRIMESTER: ICD-10-CM

## 2017-09-25 DIAGNOSIS — O09.523 ELDERLY MULTIGRAVIDA IN THIRD TRIMESTER: ICD-10-CM

## 2017-09-25 DIAGNOSIS — O34.219 PREVIOUS CESAREAN DELIVERY AFFECTING PREGNANCY: ICD-10-CM

## 2017-09-25 DIAGNOSIS — O09.523 ELDERLY MULTIGRAVIDA IN THIRD TRIMESTER: Primary | ICD-10-CM

## 2017-09-25 PROCEDURE — 76819 FETAL BIOPHYS PROFIL W/O NST: CPT | Performed by: OBSTETRICS & GYNECOLOGY

## 2017-09-25 PROCEDURE — 99212 OFFICE O/P EST SF 10 MIN: CPT | Performed by: OBSTETRICS & GYNECOLOGY

## 2017-09-25 NOTE — PROGRESS NOTES
Pr here for ultrasound /BPP  AMA  IVF  Hx of anxiety   Hx of fibroids  Denies pregnancy complaints and states feeling fetal movement

## 2017-09-26 ENCOUNTER — OFFICE VISIT (OUTPATIENT)
Dept: OBGYN CLINIC | Facility: CLINIC | Age: 42
End: 2017-09-26

## 2017-09-26 ENCOUNTER — HOSPITAL ENCOUNTER (OUTPATIENT)
Facility: HOSPITAL | Age: 42
Discharge: HOME | End: 2017-09-26
Attending: OBSTETRICS & GYNECOLOGY | Admitting: OBSTETRICS & GYNECOLOGY
Payer: COMMERCIAL

## 2017-09-26 ENCOUNTER — APPOINTMENT (OUTPATIENT)
Dept: OBGYN CLINIC | Facility: HOSPITAL | Age: 42
End: 2017-09-26
Payer: COMMERCIAL

## 2017-09-26 VITALS — SYSTOLIC BLOOD PRESSURE: 125 MMHG | HEART RATE: 90 BPM | DIASTOLIC BLOOD PRESSURE: 64 MMHG

## 2017-09-26 VITALS
BODY MASS INDEX: 30 KG/M2 | SYSTOLIC BLOOD PRESSURE: 105 MMHG | DIASTOLIC BLOOD PRESSURE: 67 MMHG | WEIGHT: 162 LBS | HEART RATE: 85 BPM

## 2017-09-26 DIAGNOSIS — Z34.93 ENCOUNTER FOR SUPERVISION OF NORMAL PREGNANCY IN THIRD TRIMESTER, UNSPECIFIED GRAVIDITY: Primary | ICD-10-CM

## 2017-09-26 LAB
MULTISTIX LOT#: NORMAL NUMERIC
PH, URINE: 6.5 (ref 4.5–8)
SPECIFIC GRAVITY: 1.02 (ref 1–1.03)

## 2017-09-26 PROCEDURE — 59025 FETAL NON-STRESS TEST: CPT | Performed by: OBSTETRICS & GYNECOLOGY

## 2017-09-26 NOTE — NST
Nonstress Test   Patient: Abi Brown    Gestation: 37w4d    NST:AMA       Variability: Moderate           Accelerations: Yes           Decelerations: None            Baseline: 125 BPM           Uterine Irritability: No           Contractions: Not prese

## 2017-09-29 ENCOUNTER — HOSPITAL ENCOUNTER (OUTPATIENT)
Dept: OBGYN CLINIC | Facility: HOSPITAL | Age: 42
Discharge: HOME | End: 2017-09-29
Payer: COMMERCIAL

## 2017-09-29 ENCOUNTER — HOSPITAL ENCOUNTER (OUTPATIENT)
Facility: HOSPITAL | Age: 42
Discharge: HOME | End: 2017-09-29
Attending: OBSTETRICS & GYNECOLOGY | Admitting: OBSTETRICS & GYNECOLOGY
Payer: COMMERCIAL

## 2017-09-29 PROCEDURE — 59025 FETAL NON-STRESS TEST: CPT | Performed by: OBSTETRICS & GYNECOLOGY

## 2017-09-29 NOTE — NST
Nonstress Test   Patient: Maxx Dugan    Gestation: 38w0d    NST:       Variability: Moderate           Accelerations: Yes           Decelerations: None            Baseline: 125 BPM           Uterine Irritability: Yes           Contractions: Not present

## 2017-10-02 ENCOUNTER — HOSPITAL ENCOUNTER (OUTPATIENT)
Dept: PERINATAL CARE | Facility: HOSPITAL | Age: 42
Discharge: HOME | End: 2017-10-02
Attending: OBSTETRICS & GYNECOLOGY
Payer: COMMERCIAL

## 2017-10-02 ENCOUNTER — OFFICE VISIT (OUTPATIENT)
Dept: OBGYN CLINIC | Facility: CLINIC | Age: 42
End: 2017-10-02

## 2017-10-02 VITALS
HEIGHT: 62 IN | BODY MASS INDEX: 29.81 KG/M2 | RESPIRATION RATE: 14 BRPM | SYSTOLIC BLOOD PRESSURE: 122 MMHG | HEART RATE: 75 BPM | WEIGHT: 162 LBS | DIASTOLIC BLOOD PRESSURE: 60 MMHG

## 2017-10-02 VITALS
DIASTOLIC BLOOD PRESSURE: 66 MMHG | HEART RATE: 77 BPM | SYSTOLIC BLOOD PRESSURE: 110 MMHG | WEIGHT: 160.63 LBS | BODY MASS INDEX: 29 KG/M2

## 2017-10-02 DIAGNOSIS — Z34.93 ENCOUNTER FOR SUPERVISION OF NORMAL PREGNANCY IN THIRD TRIMESTER, UNSPECIFIED GRAVIDITY: Primary | ICD-10-CM

## 2017-10-02 DIAGNOSIS — O09.523 ELDERLY MULTIGRAVIDA IN THIRD TRIMESTER: ICD-10-CM

## 2017-10-02 DIAGNOSIS — O09.523 ELDERLY MULTIGRAVIDA IN THIRD TRIMESTER: Primary | ICD-10-CM

## 2017-10-02 DIAGNOSIS — O09.813 PREGNANCY RESULTING FROM ASSISTED REPRODUCTIVE TECHNOLOGY IN THIRD TRIMESTER: ICD-10-CM

## 2017-10-02 PROCEDURE — 90471 IMMUNIZATION ADMIN: CPT | Performed by: OBSTETRICS & GYNECOLOGY

## 2017-10-02 PROCEDURE — 90686 IIV4 VACC NO PRSV 0.5 ML IM: CPT | Performed by: OBSTETRICS & GYNECOLOGY

## 2017-10-02 PROCEDURE — 76819 FETAL BIOPHYS PROFIL W/O NST: CPT | Performed by: OBSTETRICS & GYNECOLOGY

## 2017-10-02 PROCEDURE — 99212 OFFICE O/P EST SF 10 MIN: CPT | Performed by: OBSTETRICS & GYNECOLOGY

## 2017-10-02 NOTE — PROGRESS NOTES
Outpatient Maternal-Fetal Medicine Consultation     Dear Dr. Maynor Adrian,     Thank you for requesting ultrasound evaluation and maternal fetal medicine consultation on your patient Alexa Villar As you are aware she is a 35year old female with a Marion coordination of care.  Our discussion is summarized above. The approximate physician face-to-face time was 10 minutes.

## 2017-10-03 ENCOUNTER — HOSPITAL ENCOUNTER (OUTPATIENT)
Facility: HOSPITAL | Age: 42
Discharge: HOME | End: 2017-10-03
Attending: OBSTETRICS & GYNECOLOGY | Admitting: OBSTETRICS & GYNECOLOGY
Payer: COMMERCIAL

## 2017-10-03 ENCOUNTER — APPOINTMENT (OUTPATIENT)
Dept: OBGYN CLINIC | Facility: HOSPITAL | Age: 42
End: 2017-10-03
Payer: COMMERCIAL

## 2017-10-03 VITALS — DIASTOLIC BLOOD PRESSURE: 54 MMHG | SYSTOLIC BLOOD PRESSURE: 116 MMHG | HEART RATE: 104 BPM | RESPIRATION RATE: 16 BRPM

## 2017-10-03 PROCEDURE — 59025 FETAL NON-STRESS TEST: CPT | Performed by: OBSTETRICS & GYNECOLOGY

## 2017-10-03 NOTE — NST
Nonstress Test   Patient: Alvino Auguste    Gestation: 38w4d    NST:Reactive       Variability: Moderate           Accelerations: Yes           Decelerations: None            Baseline: 135 BPM           Uterine Irritability: Yes           Contractions: Not

## 2017-10-06 ENCOUNTER — HOSPITAL ENCOUNTER (OUTPATIENT)
Facility: HOSPITAL | Age: 42
Discharge: HOME | End: 2017-10-06
Attending: OBSTETRICS & GYNECOLOGY | Admitting: OBSTETRICS & GYNECOLOGY
Payer: COMMERCIAL

## 2017-10-06 ENCOUNTER — APPOINTMENT (OUTPATIENT)
Dept: OBGYN CLINIC | Facility: HOSPITAL | Age: 42
End: 2017-10-06
Payer: COMMERCIAL

## 2017-10-06 VITALS — DIASTOLIC BLOOD PRESSURE: 48 MMHG | SYSTOLIC BLOOD PRESSURE: 129 MMHG | HEART RATE: 90 BPM

## 2017-10-06 PROCEDURE — 59025 FETAL NON-STRESS TEST: CPT | Performed by: OBSTETRICS & GYNECOLOGY

## 2017-10-09 ENCOUNTER — LAB ENCOUNTER (OUTPATIENT)
Dept: LAB | Facility: HOSPITAL | Age: 42
End: 2017-10-09
Attending: OBSTETRICS & GYNECOLOGY
Payer: COMMERCIAL

## 2017-10-09 DIAGNOSIS — Z01.818 PRE-OP TESTING: ICD-10-CM

## 2017-10-09 PROCEDURE — 81003 URINALYSIS AUTO W/O SCOPE: CPT

## 2017-10-09 PROCEDURE — 86850 RBC ANTIBODY SCREEN: CPT

## 2017-10-09 PROCEDURE — 86900 BLOOD TYPING SEROLOGIC ABO: CPT

## 2017-10-09 PROCEDURE — 86901 BLOOD TYPING SEROLOGIC RH(D): CPT

## 2017-10-09 PROCEDURE — 85025 COMPLETE CBC W/AUTO DIFF WBC: CPT

## 2017-10-09 PROCEDURE — 36415 COLL VENOUS BLD VENIPUNCTURE: CPT

## 2017-10-10 ENCOUNTER — ANESTHESIA (OUTPATIENT)
Dept: OBGYN UNIT | Facility: HOSPITAL | Age: 42
End: 2017-10-10
Payer: COMMERCIAL

## 2017-10-10 ENCOUNTER — ANESTHESIA EVENT (OUTPATIENT)
Dept: OBGYN UNIT | Facility: HOSPITAL | Age: 42
End: 2017-10-10
Payer: COMMERCIAL

## 2017-10-10 ENCOUNTER — HOSPITAL ENCOUNTER (INPATIENT)
Facility: HOSPITAL | Age: 42
LOS: 3 days | Discharge: HOME | End: 2017-10-13
Attending: OBSTETRICS & GYNECOLOGY | Admitting: OBSTETRICS & GYNECOLOGY
Payer: COMMERCIAL

## 2017-10-10 ENCOUNTER — SURGERY (OUTPATIENT)
Age: 42
End: 2017-10-10

## 2017-10-10 DIAGNOSIS — O34.219 PREVIOUS CESAREAN DELIVERY AFFECTING PREGNANCY: Primary | ICD-10-CM

## 2017-10-10 PROCEDURE — 59510 CESAREAN DELIVERY: CPT | Performed by: OBSTETRICS & GYNECOLOGY

## 2017-10-10 PROCEDURE — 59514 CESAREAN DELIVERY ONLY: CPT | Performed by: OBSTETRICS & GYNECOLOGY

## 2017-10-10 RX ORDER — AMMONIA INHALANTS 0.04 G/.3ML
0.3 INHALANT RESPIRATORY (INHALATION) AS NEEDED
Status: DISCONTINUED | OUTPATIENT
Start: 2017-10-10 | End: 2017-10-13

## 2017-10-10 RX ORDER — DOCUSATE SODIUM 100 MG/1
100 CAPSULE, LIQUID FILLED ORAL DAILY
Status: DISCONTINUED | OUTPATIENT
Start: 2017-10-10 | End: 2017-10-13

## 2017-10-10 RX ORDER — PHENYLEPHRINE HCL 10 MG/ML
VIAL (ML) INJECTION AS NEEDED
Status: DISCONTINUED | OUTPATIENT
Start: 2017-10-10 | End: 2017-10-10 | Stop reason: SURG

## 2017-10-10 RX ORDER — BUPIVACAINE HYDROCHLORIDE 7.5 MG/ML
INJECTION, SOLUTION INTRASPINAL AS NEEDED
Status: DISCONTINUED | OUTPATIENT
Start: 2017-10-10 | End: 2017-10-10 | Stop reason: SURG

## 2017-10-10 RX ORDER — SIMETHICONE 80 MG
80 TABLET,CHEWABLE ORAL 3 TIMES DAILY PRN
Status: DISCONTINUED | OUTPATIENT
Start: 2017-10-10 | End: 2017-10-13

## 2017-10-10 RX ORDER — SODIUM CHLORIDE 0.9 % (FLUSH) 0.9 %
10 SYRINGE (ML) INJECTION AS NEEDED
Status: DISCONTINUED | OUTPATIENT
Start: 2017-10-10 | End: 2017-10-13

## 2017-10-10 RX ORDER — HALOPERIDOL 5 MG/ML
0.5 INJECTION INTRAMUSCULAR ONCE AS NEEDED
Status: ACTIVE | OUTPATIENT
Start: 2017-10-10 | End: 2017-10-10

## 2017-10-10 RX ORDER — NALBUPHINE HCL 10 MG/ML
2.5 AMPUL (ML) INJECTION EVERY 4 HOURS PRN
Status: ACTIVE | OUTPATIENT
Start: 2017-10-10 | End: 2017-10-11

## 2017-10-10 RX ORDER — PRENATAL VIT,CAL 76/IRON/FOLIC 29 MG-1 MG
1 TABLET ORAL DAILY
Status: DISCONTINUED | OUTPATIENT
Start: 2017-10-10 | End: 2017-10-13

## 2017-10-10 RX ORDER — TRISODIUM CITRATE DIHYDRATE AND CITRIC ACID MONOHYDRATE 500; 334 MG/5ML; MG/5ML
30 SOLUTION ORAL ONCE
Status: COMPLETED | OUTPATIENT
Start: 2017-10-10 | End: 2017-10-10

## 2017-10-10 RX ORDER — DIPHENHYDRAMINE HYDROCHLORIDE 50 MG/ML
12.5 INJECTION INTRAMUSCULAR; INTRAVENOUS EVERY 4 HOURS PRN
Status: ACTIVE | OUTPATIENT
Start: 2017-10-10 | End: 2017-10-11

## 2017-10-10 RX ORDER — HYDROMORPHONE HYDROCHLORIDE 1 MG/ML
0.4 INJECTION, SOLUTION INTRAMUSCULAR; INTRAVENOUS; SUBCUTANEOUS
Status: ACTIVE | OUTPATIENT
Start: 2017-10-10 | End: 2017-10-11

## 2017-10-10 RX ORDER — LIDOCAINE HYDROCHLORIDE 10 MG/ML
INJECTION, SOLUTION EPIDURAL; INFILTRATION; INTRACAUDAL; PERINEURAL AS NEEDED
Status: DISCONTINUED | OUTPATIENT
Start: 2017-10-10 | End: 2017-10-10 | Stop reason: SURG

## 2017-10-10 RX ORDER — OXYCODONE AND ACETAMINOPHEN 7.5; 325 MG/1; MG/1
1 TABLET ORAL EVERY 4 HOURS PRN
Status: DISCONTINUED | OUTPATIENT
Start: 2017-10-11 | End: 2017-10-13

## 2017-10-10 RX ORDER — ACETAMINOPHEN 325 MG/1
650 TABLET ORAL EVERY 6 HOURS PRN
Status: ACTIVE | OUTPATIENT
Start: 2017-10-10 | End: 2017-10-11

## 2017-10-10 RX ORDER — KETOROLAC TROMETHAMINE 30 MG/ML
30 INJECTION, SOLUTION INTRAMUSCULAR; INTRAVENOUS EVERY 6 HOURS PRN
Status: DISPENSED | OUTPATIENT
Start: 2017-10-10 | End: 2017-10-12

## 2017-10-10 RX ORDER — ONDANSETRON 2 MG/ML
4 INJECTION INTRAMUSCULAR; INTRAVENOUS EVERY 6 HOURS PRN
Status: DISCONTINUED | OUTPATIENT
Start: 2017-10-10 | End: 2017-10-13

## 2017-10-10 RX ORDER — KETOROLAC TROMETHAMINE 30 MG/ML
30 INJECTION, SOLUTION INTRAMUSCULAR; INTRAVENOUS ONCE AS NEEDED
Status: ACTIVE | OUTPATIENT
Start: 2017-10-10 | End: 2017-10-10

## 2017-10-10 RX ORDER — DIPHENHYDRAMINE HYDROCHLORIDE 50 MG/ML
25 INJECTION INTRAMUSCULAR; INTRAVENOUS ONCE AS NEEDED
Status: ACTIVE | OUTPATIENT
Start: 2017-10-10 | End: 2017-10-10

## 2017-10-10 RX ORDER — SODIUM CHLORIDE 0.9 % (FLUSH) 0.9 %
10 SYRINGE (ML) INJECTION AS NEEDED
Status: DISCONTINUED | OUTPATIENT
Start: 2017-10-10 | End: 2017-10-10

## 2017-10-10 RX ORDER — MORPHINE SULFATE 1 MG/ML
INJECTION, SOLUTION EPIDURAL; INTRATHECAL; INTRAVENOUS AS NEEDED
Status: DISCONTINUED | OUTPATIENT
Start: 2017-10-10 | End: 2017-10-10 | Stop reason: SURG

## 2017-10-10 RX ORDER — BISACODYL 10 MG
10 SUPPOSITORY, RECTAL RECTAL
Status: DISCONTINUED | OUTPATIENT
Start: 2017-10-10 | End: 2017-10-13

## 2017-10-10 RX ORDER — ONDANSETRON 2 MG/ML
4 INJECTION INTRAMUSCULAR; INTRAVENOUS ONCE AS NEEDED
Status: ACTIVE | OUTPATIENT
Start: 2017-10-10 | End: 2017-10-10

## 2017-10-10 RX ORDER — ONDANSETRON 2 MG/ML
INJECTION INTRAMUSCULAR; INTRAVENOUS AS NEEDED
Status: DISCONTINUED | OUTPATIENT
Start: 2017-10-10 | End: 2017-10-10 | Stop reason: SURG

## 2017-10-10 RX ORDER — HYDROCODONE BITARTRATE AND ACETAMINOPHEN 7.5; 325 MG/1; MG/1
2 TABLET ORAL EVERY 6 HOURS PRN
Status: ACTIVE | OUTPATIENT
Start: 2017-10-10 | End: 2017-10-11

## 2017-10-10 RX ORDER — SODIUM CHLORIDE, SODIUM LACTATE, POTASSIUM CHLORIDE, CALCIUM CHLORIDE 600; 310; 30; 20 MG/100ML; MG/100ML; MG/100ML; MG/100ML
INJECTION, SOLUTION INTRAVENOUS CONTINUOUS
Status: DISCONTINUED | OUTPATIENT
Start: 2017-10-10 | End: 2017-10-10

## 2017-10-10 RX ORDER — NALBUPHINE HCL 10 MG/ML
2.5 AMPUL (ML) INJECTION
Status: DISCONTINUED | OUTPATIENT
Start: 2017-10-10 | End: 2017-10-13

## 2017-10-10 RX ORDER — HYDROMORPHONE HYDROCHLORIDE 1 MG/ML
0.6 INJECTION, SOLUTION INTRAMUSCULAR; INTRAVENOUS; SUBCUTANEOUS
Status: ACTIVE | OUTPATIENT
Start: 2017-10-10 | End: 2017-10-11

## 2017-10-10 RX ORDER — SODIUM CHLORIDE, SODIUM LACTATE, POTASSIUM CHLORIDE, CALCIUM CHLORIDE 600; 310; 30; 20 MG/100ML; MG/100ML; MG/100ML; MG/100ML
INJECTION, SOLUTION INTRAVENOUS
Status: DISPENSED
Start: 2017-10-10 | End: 2017-10-11

## 2017-10-10 RX ORDER — SODIUM CHLORIDE, SODIUM LACTATE, POTASSIUM CHLORIDE, CALCIUM CHLORIDE 600; 310; 30; 20 MG/100ML; MG/100ML; MG/100ML; MG/100ML
INJECTION, SOLUTION INTRAVENOUS CONTINUOUS
Status: DISCONTINUED | OUTPATIENT
Start: 2017-10-10 | End: 2017-10-13

## 2017-10-10 RX ORDER — DEXTROSE, SODIUM CHLORIDE, SODIUM LACTATE, POTASSIUM CHLORIDE, AND CALCIUM CHLORIDE 5; .6; .31; .03; .02 G/100ML; G/100ML; G/100ML; G/100ML; G/100ML
INJECTION, SOLUTION INTRAVENOUS CONTINUOUS
Status: DISCONTINUED | OUTPATIENT
Start: 2017-10-10 | End: 2017-10-13

## 2017-10-10 RX ORDER — HYDROCODONE BITARTRATE AND ACETAMINOPHEN 7.5; 325 MG/1; MG/1
1 TABLET ORAL EVERY 4 HOURS PRN
Status: DISCONTINUED | OUTPATIENT
Start: 2017-10-10 | End: 2017-10-13

## 2017-10-10 RX ORDER — NALOXONE HYDROCHLORIDE 0.4 MG/ML
0.08 INJECTION, SOLUTION INTRAMUSCULAR; INTRAVENOUS; SUBCUTANEOUS
Status: ACTIVE | OUTPATIENT
Start: 2017-10-10 | End: 2017-10-11

## 2017-10-10 RX ORDER — HYDROCODONE BITARTRATE AND ACETAMINOPHEN 7.5; 325 MG/1; MG/1
1 TABLET ORAL EVERY 6 HOURS PRN
Status: DISPENSED | OUTPATIENT
Start: 2017-10-10 | End: 2017-10-11

## 2017-10-10 RX ORDER — DIPHENHYDRAMINE HCL 25 MG
25 CAPSULE ORAL EVERY 4 HOURS PRN
Status: ACTIVE | OUTPATIENT
Start: 2017-10-10 | End: 2017-10-11

## 2017-10-10 RX ORDER — DEXAMETHASONE SODIUM PHOSPHATE 4 MG/ML
VIAL (ML) INJECTION AS NEEDED
Status: DISCONTINUED | OUTPATIENT
Start: 2017-10-10 | End: 2017-10-10 | Stop reason: SURG

## 2017-10-10 RX ORDER — KETOROLAC TROMETHAMINE 30 MG/ML
INJECTION, SOLUTION INTRAMUSCULAR; INTRAVENOUS AS NEEDED
Status: DISCONTINUED | OUTPATIENT
Start: 2017-10-10 | End: 2017-10-10 | Stop reason: SURG

## 2017-10-10 RX ADMIN — PHENYLEPHRINE HCL 200 MCG: 10 MG/ML VIAL (ML) INJECTION at 15:10:00

## 2017-10-10 RX ADMIN — BUPIVACAINE HYDROCHLORIDE 1.6 ML: 7.5 INJECTION, SOLUTION INTRASPINAL at 14:23:00

## 2017-10-10 RX ADMIN — SODIUM CHLORIDE, SODIUM LACTATE, POTASSIUM CHLORIDE, CALCIUM CHLORIDE: 600; 310; 30; 20 INJECTION, SOLUTION INTRAVENOUS at 15:36:00

## 2017-10-10 RX ADMIN — SODIUM CHLORIDE, SODIUM LACTATE, POTASSIUM CHLORIDE, CALCIUM CHLORIDE: 600; 310; 30; 20 INJECTION, SOLUTION INTRAVENOUS at 14:45:00

## 2017-10-10 RX ADMIN — PHENYLEPHRINE HCL 200 MCG: 10 MG/ML VIAL (ML) INJECTION at 14:30:00

## 2017-10-10 RX ADMIN — LIDOCAINE HYDROCHLORIDE 3 ML: 10 INJECTION, SOLUTION EPIDURAL; INFILTRATION; INTRACAUDAL; PERINEURAL at 14:19:00

## 2017-10-10 RX ADMIN — DEXAMETHASONE SODIUM PHOSPHATE 4 MG: 4 MG/ML VIAL (ML) INJECTION at 14:52:00

## 2017-10-10 RX ADMIN — MORPHINE SULFATE 0.3 MG: 1 INJECTION, SOLUTION EPIDURAL; INTRATHECAL; INTRAVENOUS at 14:23:00

## 2017-10-10 RX ADMIN — KETOROLAC TROMETHAMINE 30 MG: 30 INJECTION, SOLUTION INTRAMUSCULAR; INTRAVENOUS at 15:00:00

## 2017-10-10 RX ADMIN — ONDANSETRON 4 MG: 2 INJECTION INTRAMUSCULAR; INTRAVENOUS at 14:52:00

## 2017-10-10 RX ADMIN — SODIUM CHLORIDE, SODIUM LACTATE, POTASSIUM CHLORIDE, CALCIUM CHLORIDE: 600; 310; 30; 20 INJECTION, SOLUTION INTRAVENOUS at 14:17:00

## 2017-10-10 NOTE — PROGRESS NOTES
[facility] HOSP - [facility]     Section Delivery / Brief Operative Note    Jinx Marking Patient Status:  Inpatient    1975 MRN [id number]   Location [address]  Attending Stephen Chow, 3 [address] Opal Buck Day # 0 [facility] Am

## 2017-10-10 NOTE — ANESTHESIA POSTPROCEDURE EVALUATION
Patient: Susan Vaughan    Procedure Summary     Date:  10/10/17 Room / Location:  [address] [place] [address] L+D OR  [facility] L+D OR    Anesthesia Start:   Anesthesia Stop:      Procedure:   SECTION (N/A Abdomen) Diagnosis:  (same as preop )    Surgeon:  Jaswinder Jensen,

## 2017-10-10 NOTE — H&P
[facility]    PATIENT'S NAME: Luis Jorgensen   ATTENDING PHYSICIAN: Jai Olmstead.  Artemio Halsted, DO   PATIENT ACCOUNT#:   [de-identified]    LOCATION:    MEDICAL RECORD #:   [id number]       YOB: 1975  ADMISSION DATE:       10/10/2017    HISTORY AN  section due to failure to progress and abnormal fetal heart rate pattern on 06/10/2015. MEDICATIONS:  Prenatal vitamins 1 daily.     ALLERGIES:  Demerol which has caused rash in the past.      SOCIAL HISTORY:  The patient has never smoked, and s

## 2017-10-10 NOTE — DISCHARGE SUMMARY
[facility]    Discharge Summary    Maxx Dugan Patient Status:  Inpatient    1975 MRN [id number]   Location [address]  Attending Maynor Adrian, 3 [facility]       Admit date:  10/10/2017    Aleksey Martinez

## 2017-10-10 NOTE — ANESTHESIA PREPROCEDURE EVALUATION
Anesthesia PreOp Note    HPI:     Gaston Duval is a 38year old female who presents for preoperative consultation requested by: Mykel Wang DO    Date of Surgery: 10/10/2017    Procedure(s):   SECTION  Indication: Repeat Cesearean Section. Master Patton History Narrative   None on file       Available pre-op labs reviewed.     Lab Results  Component Value Date   WBC 10.6 10/09/2017   RBC 3.86 10/09/2017   HGB 12.5 10/09/2017   HCT 36.6 10/09/2017   MCV 94.8 10/09/2017   MCH 32.5 (H) 10/09/2017   MCHC 34.3

## 2017-10-10 NOTE — ANESTHESIA PROCEDURE NOTES
Spinal Block  Performed by: Yang Whatley by: Neyda Jackman     Patient Location:  OB  Start Time:  10/10/2017 2:19 PM  End Time:  10/10/2017 2:27 PM  Site identification: surface landmarks    Reason for Block: post-op pain manageme

## 2017-10-10 NOTE — LACTATION NOTE
LACTATION NOTE - MOTHER      Evaluation Type: Inpatient    Problems identified  Problems identified: Knowledge deficit;Milk supply WNL    Maternal history  Maternal history:  section; Infertility  Other/comment: IVF, scheduled repeat C/S    Breastfe

## 2017-10-10 NOTE — INTERVAL H&P NOTE
Pre-op Diagnosis: Repeat Cesearean Section. ..     The above referenced H&P was reviewed by Diana Branch DO on 10/10/2017, the patient was examined and no significant changes have occurred in the patient's condition since the H&P was performed.   I

## 2017-10-11 RX ORDER — IBUPROFEN 600 MG/1
600 TABLET ORAL EVERY 6 HOURS PRN
Status: DISCONTINUED | OUTPATIENT
Start: 2017-10-11 | End: 2017-10-13

## 2017-10-11 RX ORDER — ENOXAPARIN SODIUM 100 MG/ML
40 INJECTION SUBCUTANEOUS DAILY
Status: DISCONTINUED | OUTPATIENT
Start: 2017-10-11 | End: 2017-10-13

## 2017-10-11 NOTE — LACTATION NOTE
This note was copied from a baby's chart.   LACTATION NOTE - INFANT    Evaluation Type  Evaluation Type: Inpatient    Problems & Assessment  Problems Diagnosed or Identified: Tongue restriction  Problems: comment/detail: mom states that pedicatrician though minimum weight gain of 4-7 oz per week  while maintaining breast milk feeding. Written Education: Breastfeeding/pumping journal;Nipple shield guidelines;Breastfeeding suggestions for home; Outpatient lactation clinic handout; Patient Instructions  Evaluatio

## 2017-10-11 NOTE — LACTATION NOTE
LACTATION NOTE - MOTHER      Evaluation Type: Inpatient    Problems identified  Problems identified: Knowledge deficit  Problems Identified Other: reports 2 good feedings since birth with attempts    Maternal history  Maternal history:  section; Inf

## 2017-10-11 NOTE — PROGRESS NOTES
[facility] AND [facility]    Patients Name: Malina Kelechi  Attending Physician: Jaiden Roth DO  CSN: [id number]    Location:  363/363-A  MRN: [id number]    YOB: 1975  Admission Date: 10/10/2017     Obstetric Anesthesia Pain Progress Note    Po

## 2017-10-11 NOTE — LACTATION NOTE
LACTATION NOTE - MOTHER      Evaluation Type: Inpatient    Problems identified  Problems identified: Knowledge deficit    Maternal history  Maternal history:  section; Infertility  Other/comment: IVF, scheduled repeat C/S    Breastfeeding goal  [facility]

## 2017-10-11 NOTE — PLAN OF CARE
BREAST FEEDING    • Optimize infant feeding at the breast Progressing          INADEQUATE LATCH, SUCK OR SWALLOW    • Demonstrate ability to latch and sustain latch, audible swallowing and satiety Progressing          Patient Centered Care    • Patient pre

## 2017-10-11 NOTE — PROGRESS NOTES
[facility] - [facility]    OB/GYNE Progress Note      Jose Bishop Patient Status:  Inpatient    1975 MRN [id number]   Location [facility] 3SE Attending Soraida 111 Day # 1 PCP Nigel Neves MD       Subjective   Late entr   10/11/2017  11:04 AM

## 2017-10-11 NOTE — PLAN OF CARE
BREAST FEEDING    • Optimize infant feeding at the breast Progressing        Patient Centered Care    • Patient preferences are identified and integrated in the patient's plan of care Progressing        Patient/Family Goals    • Patient/Family Long Term Go

## 2017-10-11 NOTE — ANESTHESIA POST-OP FOLLOW-UP NOTE
[facility]    Patients Name: Jose Bishop  Attending Physician: Romario Izquierdo DO  CSN: [id number]    Location:  363/363-A  MRN: [id number]    YOB: 1975  Admission Date: 10/10/2017     Obstetric Anesthesia Pain Progress Note    Po

## 2017-10-11 NOTE — LACTATION NOTE
This note was copied from a baby's chart. Visible short anterior lingual frenulum appears to be restricting extension and elevation of infant tongue. Latches and suckles with nipple shield.

## 2017-10-11 NOTE — OPERATIVE REPORT
[facility]    PATIENT'S NAME: Kathy Pouch   ATTENDING PHYSICIAN: Diana Singer DO   OPERATING PHYSICIAN: Poonam Singer DO   PATIENT ACCOUNT#:   [de-identified]    LOCATION:  [address] [place] [address] #:   [id number]       DATE OF BIRTH: ruptured showing clear fluid. The uterine incision was extended bluntly. The fetal head was palpated and delivered with the aid of fundal pressure. Oropharyngeal and nasal secretions were aspirated with a bulb syringe.   The remainder of the baby was del 08:19:05  [facility] [id number]  [facility]/    cc: Parveen Barrett.  Dinesh Watkins MD

## 2017-10-11 NOTE — PROGRESS NOTES
Spoke with Dr. Shane Sanchez regarding patient. Dr. Shane Sanchez to order Lovenox on patient and two hours after injection may stop SCDs and encourage patient to ambulate.

## 2017-10-12 NOTE — PROGRESS NOTES
[facility]    OB/Gyne Post  Section Progress Note      Jordis Show Patient Status:  Inpatient    1975 MRN [id number]   Location [facility] 3SE Attending [address] [place] [address], [address] [place] [address] Day # 2 PCP Lore Wells MD

## 2017-10-12 NOTE — LACTATION NOTE
LACTATION NOTE - MOTHER      Evaluation Type: Inpatient    Problems identified  Problems identified: Knowledge deficit    Maternal history  Maternal history:  section         Maternal Assessment  Bilateral Breasts: Soft  Bilateral Nipples: Everted;

## 2017-10-12 NOTE — LACTATION NOTE
This note was copied from a baby's chart. LACTATION NOTE - INFANT    Evaluation Type  Evaluation Type: Inpatient    Problems & Assessment  Problems Diagnosed or Identified: Ankyloglossia; Tongue restriction; Latch difficulty; Shallow latch  Problems: comment

## 2017-10-13 VITALS
TEMPERATURE: 98 F | SYSTOLIC BLOOD PRESSURE: 104 MMHG | WEIGHT: 160 LBS | DIASTOLIC BLOOD PRESSURE: 58 MMHG | OXYGEN SATURATION: 97 % | HEART RATE: 81 BPM | HEIGHT: 62 IN | BODY MASS INDEX: 29.44 KG/M2 | RESPIRATION RATE: 16 BRPM

## 2017-10-13 RX ORDER — IBUPROFEN 600 MG/1
600 TABLET ORAL EVERY 6 HOURS PRN
Qty: 60 TABLET | Refills: 0 | Status: SHIPPED | OUTPATIENT
Start: 2017-10-13 | End: 2017-11-20

## 2017-10-13 RX ORDER — HYDROCODONE BITARTRATE AND ACETAMINOPHEN 7.5; 325 MG/1; MG/1
1 TABLET ORAL EVERY 4 HOURS PRN
Qty: 30 TABLET | Refills: 0 | Status: SHIPPED | OUTPATIENT
Start: 2017-10-13 | End: 2017-10-20

## 2017-10-13 RX ORDER — PSEUDOEPHEDRINE HCL 30 MG
100 TABLET ORAL DAILY
Qty: 60 CAPSULE | Refills: 0 | Status: SHIPPED | OUTPATIENT
Start: 2017-10-13 | End: 2017-11-20

## 2017-10-13 NOTE — LACTATION NOTE
LACTATION NOTE - MOTHER      Evaluation Type: Inpatient    Problems identified  Problems identified: Knowledge deficit; Nipple pain;Milk supply WNL  Problems Identified Other: Baby with TT clipped by ENT yesterday    Maternal history  Maternal history: Cesa

## 2017-10-13 NOTE — PROGRESS NOTES
Post-Partum Note   10/13/2017, 6:50 AM    Subjective:  Patient doing well. Her pain is controlled. She is ambulating without lightheadedness. She is passing flatus. She denies fevers or chills.      Objective:   10/11/17  2210 10/12/17  0900 10/12/17  1500

## 2017-10-17 ENCOUNTER — TELEPHONE (OUTPATIENT)
Dept: PEDIATRICS CLINIC | Facility: CLINIC | Age: 42
End: 2017-10-17

## 2017-10-17 NOTE — TELEPHONE ENCOUNTER
Pt had a repeat  on 10/10 and is c/o pain on left side. Pt noticed a lot of bruising in the area. Pt states that incision is not red and there is no bleeding or leaking. Pain is not relieved with pain meds but it does help.  Pt reports that MART[facility]NEZ cecilia

## 2017-10-17 NOTE — TELEPHONE ENCOUNTER
Pt states she delivered last week  And had a , is experiencing pain on the left side of incision and the outside looks a bit bruised.

## 2017-10-19 ENCOUNTER — TELEPHONE (OUTPATIENT)
Dept: OBGYN CLINIC | Facility: CLINIC | Age: 42
End: 2017-10-19

## 2017-10-19 RX ORDER — HYDROCODONE BITARTRATE AND ACETAMINOPHEN 7.5; 325 MG/1; MG/1
1 TABLET ORAL EVERY 6 HOURS PRN
Qty: 10 TABLET | Refills: 0 | Status: SHIPPED | OUTPATIENT
Start: 2017-10-19 | End: 2017-11-20

## 2017-10-19 RX ORDER — IBUPROFEN 600 MG/1
600 TABLET ORAL EVERY 6 HOURS PRN
Qty: 30 TABLET | Refills: 0 | Status: SHIPPED | OUTPATIENT
Start: 2017-10-19 | End: 2017-10-20

## 2017-10-19 NOTE — TELEPHONE ENCOUNTER
Pt reports she had  on 10/10/17 with MARTÍNEZ and requesting [facility] refill. Pt states she took a Norco pill about one hour ago and has one pill left. Pt reports she is taking Norco every 6 hours.  Pt states when the Norco is wearing off her pain gets

## 2017-10-19 NOTE — TELEPHONE ENCOUNTER
HENRYK(on call) notified of message and v/o received for Norco refill #10 tabs and ibuprofen. Pt is to alternate between Norco every 6 hrs PRN and ibuprofen every 6 hrs PRN. Pt is also to come in tomorrow morning for incision check.    Pt notified of above and

## 2017-10-20 ENCOUNTER — NURSE ONLY (OUTPATIENT)
Dept: OBGYN CLINIC | Facility: CLINIC | Age: 42
End: 2017-10-20

## 2017-10-20 VITALS — SYSTOLIC BLOOD PRESSURE: 127 MMHG | DIASTOLIC BLOOD PRESSURE: 72 MMHG | HEART RATE: 66 BPM | TEMPERATURE: 99 F

## 2017-10-20 DIAGNOSIS — Z51.89 VISIT FOR WOUND CHECK: Primary | ICD-10-CM

## 2017-10-20 NOTE — PROGRESS NOTES
Postpartum patient in today for incision check. Delivered 10-10-17 with MARTÍNEZ. Patient c/o pain in the center of her abdomin. Patient denies fever, chills or drainage from incision.  Patient's states pain today is 2/10 after taking [place] at am and ibuprofen a

## 2017-10-24 ENCOUNTER — TELEPHONE (OUTPATIENT)
Dept: PEDIATRICS CLINIC | Facility: CLINIC | Age: 42
End: 2017-10-24

## 2017-10-24 NOTE — TELEPHONE ENCOUNTER
Pt states she is breastfeeding and asking if she is able to take the OTC products on the PN med list. Pt reports a cough and nasal congestion. Advised pt that yes the products on the list are okay to take while breast feeding.  Pt also states she had a c-se

## 2017-11-17 ENCOUNTER — TELEPHONE (OUTPATIENT)
Dept: OBGYN CLINIC | Facility: CLINIC | Age: 42
End: 2017-11-17

## 2017-11-20 ENCOUNTER — POSTPARTUM (OUTPATIENT)
Dept: OBGYN CLINIC | Facility: CLINIC | Age: 42
End: 2017-11-20

## 2017-11-20 VITALS
SYSTOLIC BLOOD PRESSURE: 112 MMHG | HEART RATE: 83 BPM | BODY MASS INDEX: 24 KG/M2 | WEIGHT: 133 LBS | DIASTOLIC BLOOD PRESSURE: 67 MMHG

## 2017-11-20 PROBLEM — O09.529 AMA (ADVANCED MATERNAL AGE) MULTIGRAVIDA 35+: Status: RESOLVED | Noted: 2017-05-15 | Resolved: 2017-10-10

## 2017-11-20 PROBLEM — O34.219 PREVIOUS CESAREAN DELIVERY AFFECTING PREGNANCY: Status: RESOLVED | Noted: 2017-05-15 | Resolved: 2017-10-10

## 2017-11-20 PROBLEM — O34.219 PREVIOUS CESAREAN DELIVERY AFFECTING PREGNANCY (HCC): Status: RESOLVED | Noted: 2017-05-15 | Resolved: 2017-10-10

## 2017-11-20 PROBLEM — O09.819 PREGNANCY RESULTING FROM ASSISTED REPRODUCTIVE TECHNOLOGY (HCC): Status: RESOLVED | Noted: 2017-06-23 | Resolved: 2017-10-10

## 2017-11-20 PROBLEM — O09.819 PREGNANCY RESULTING FROM ASSISTED REPRODUCTIVE TECHNOLOGY: Status: RESOLVED | Noted: 2017-06-23 | Resolved: 2017-10-10

## 2017-11-20 PROBLEM — O09.529 AMA (ADVANCED MATERNAL AGE) MULTIGRAVIDA 35+ (HCC): Status: RESOLVED | Noted: 2017-05-15 | Resolved: 2017-10-10

## 2017-11-20 RX ORDER — BREAST PUMP
EACH MISCELLANEOUS
Qty: 1 EACH | Refills: 0 | Status: SHIPPED | OUTPATIENT
Start: 2017-11-20 | End: 2019-01-03

## 2017-11-21 NOTE — PROGRESS NOTES
[place]    Candelaria Dyson is a 38year old female [id number] here for 6 week post-partum visit. Patient delivered a  male infant on [id number] ). Patient desires nothing. for contraception. Derick Gonzalez will seek Vasectomy. She has infertility history.   Patient is b birthcontrol including ocps, minipill, Mirena or Paragard IUD, nuvaring, orthoevra patch, nexplanon, Depoprovera, condoms or tubal sterilization options. Patient has chosen vasectomy. Patient to return for annual gyne exam in October.

## 2017-11-28 ENCOUNTER — TELEPHONE (OUTPATIENT)
Dept: OBGYN CLINIC | Facility: CLINIC | Age: 42
End: 2017-11-28

## 2017-11-28 NOTE — TELEPHONE ENCOUNTER
Pt reports having shooting pain in stomach all night. States she started feeling ill after dinner yesterday. Pt reports she had 2 soft stools over night and once this morning. Pt denies fever, body aches and chills.  Pt is breast feeding and asking what she

## 2017-11-28 NOTE — TELEPHONE ENCOUNTER
Pt had an upset stomach and had Childrens Pepto and  her baby. Pt wants to know if she should be concerned. Please advise.

## 2018-02-27 ENCOUNTER — OFFICE VISIT (OUTPATIENT)
Dept: INTERNAL MEDICINE CLINIC | Facility: CLINIC | Age: 43
End: 2018-02-27

## 2018-02-27 ENCOUNTER — NURSE TRIAGE (OUTPATIENT)
Dept: OTHER | Age: 43
End: 2018-02-27

## 2018-02-27 VITALS
DIASTOLIC BLOOD PRESSURE: 76 MMHG | SYSTOLIC BLOOD PRESSURE: 110 MMHG | WEIGHT: 130 LBS | TEMPERATURE: 99 F | HEIGHT: 63 IN | BODY MASS INDEX: 23.04 KG/M2 | HEART RATE: 90 BPM

## 2018-02-27 DIAGNOSIS — H10.9 CONJUNCTIVITIS OF RIGHT EYE, UNSPECIFIED CONJUNCTIVITIS TYPE: ICD-10-CM

## 2018-02-27 DIAGNOSIS — J06.9 UPPER RESPIRATORY TRACT INFECTION, UNSPECIFIED TYPE: Primary | ICD-10-CM

## 2018-02-27 DIAGNOSIS — L65.9 HAIR LOSS: ICD-10-CM

## 2018-02-27 PROCEDURE — 99214 OFFICE O/P EST MOD 30 MIN: CPT | Performed by: INTERNAL MEDICINE

## 2018-02-27 PROCEDURE — 99212 OFFICE O/P EST SF 10 MIN: CPT | Performed by: INTERNAL MEDICINE

## 2018-02-27 RX ORDER — TOBRAMYCIN 3 MG/ML
1 SOLUTION/ DROPS OPHTHALMIC EVERY 4 HOURS
Qty: 1 BOTTLE | Refills: 0 | Status: SHIPPED | OUTPATIENT
Start: 2018-02-27 | End: 2018-03-04

## 2018-02-27 RX ORDER — AZITHROMYCIN 250 MG/1
TABLET, FILM COATED ORAL
Qty: 6 TABLET | Refills: 0 | Status: SHIPPED | OUTPATIENT
Start: 2018-02-27 | End: 2018-05-12 | Stop reason: ALTCHOICE

## 2018-02-27 NOTE — TELEPHONE ENCOUNTER
Action Requested: Summary for Provider     []  Critical Lab, Recommendations Needed  [] Need Additional Advice  []   FYI    []   Need Orders  [] Need Medications Sent to Pharmacy  []  Other     SUMMARY: Pt was scheduled for appt this am.    Pt has been ill

## 2018-02-27 NOTE — PROGRESS NOTES
Patient ID: Getachew Kunz is a 38year old female. Patient presents with:  Eye Problem: R eye us red, x3 days  Cough: x1 week       HISTORY OF PRESENT ILLNESS:   HPI  Patient presents for above. Here with multiple complaints.   Complaining of cough, con •  Tobramycin Sulfate 0.3 % Ophthalmic Solution, Place 1 drop into the right eye every 4 (four) hours. , Disp: 1 Bottle, Rfl: 0  •  Misc.  Devices (BREAST PUMP) Does not apply Misc, Use as directed, Disp: 1 each, Rfl: 0  •  PRENATAL 27-0.8 MG Oral Tab, Take Psychiatric: She has a normal mood and affect. ASSESSMENT/PLAN:   1. Upper respiratory tract infection, unspecified type  · azithromycin 250 MG Oral Tab; Take two tablets by mouth today, then one daily. Dispense: 6 tablet;  Refill: 0  · Symptomati

## 2018-02-27 NOTE — PATIENT INSTRUCTIONS
What Is Conjunctivitis? Conjunctivitis is an irritation or infection. It affects the membrane that covers the white of your eye and the inside of your eyelid (conjunctiva). It can happen to one or both eyes.  The membrane swells and the blood vessels e

## 2018-03-24 ENCOUNTER — HOSPITAL ENCOUNTER (OUTPATIENT)
Age: 43
Discharge: HOME | End: 2018-03-24
Attending: EMERGENCY MEDICINE
Payer: COMMERCIAL

## 2018-03-24 VITALS
RESPIRATION RATE: 16 BRPM | HEART RATE: 79 BPM | DIASTOLIC BLOOD PRESSURE: 70 MMHG | SYSTOLIC BLOOD PRESSURE: 114 MMHG | TEMPERATURE: 97 F | OXYGEN SATURATION: 98 % | WEIGHT: 130 LBS | BODY MASS INDEX: 23 KG/M2

## 2018-03-24 DIAGNOSIS — J06.9 VIRAL UPPER RESPIRATORY TRACT INFECTION: Primary | ICD-10-CM

## 2018-03-24 LAB — S PYO AG THROAT QL: NEGATIVE

## 2018-03-24 PROCEDURE — 87430 STREP A AG IA: CPT

## 2018-03-24 PROCEDURE — 99204 OFFICE O/P NEW MOD 45 MIN: CPT

## 2018-03-24 PROCEDURE — 99213 OFFICE O/P EST LOW 20 MIN: CPT

## 2018-03-24 RX ORDER — OSELTAMIVIR PHOSPHATE 75 MG/1
75 CAPSULE ORAL 2 TIMES DAILY
Qty: 10 CAPSULE | Refills: 0 | Status: SHIPPED | OUTPATIENT
Start: 2018-03-24 | End: 2018-03-29

## 2018-03-24 NOTE — ED PROVIDER NOTES
Patient Seen in: [facility] In [address] [place] [address]    History   Patient presents with:  Cough/URI    Stated Complaint: flu like symptoms    HPI    Patient is a 28-year-old female with a 1 day history of subjective fever and slightly sore throa distal pulses. Pulmonary/Chest: Effort normal and breath sounds normal. No respiratory distress. Abdominal: Soft. Bowel sounds are normal. Exhibits no distension and no mass. There is no tenderness. There is no rebound and no guarding.    Musculoskelet

## 2018-03-24 NOTE — ED NOTES
Strep negative increase po fluids motrin or tylenol for fever aches and pains. Care notes reviewed with pt. Wanted tamiflu prescription given and reviewed.

## 2018-04-11 ENCOUNTER — OFFICE VISIT (OUTPATIENT)
Dept: INTERNAL MEDICINE CLINIC | Facility: CLINIC | Age: 43
End: 2018-04-11

## 2018-04-11 VITALS
SYSTOLIC BLOOD PRESSURE: 155 MMHG | WEIGHT: 132 LBS | TEMPERATURE: 99 F | HEIGHT: 63 IN | BODY MASS INDEX: 23.39 KG/M2 | DIASTOLIC BLOOD PRESSURE: 67 MMHG | HEART RATE: 55 BPM

## 2018-04-11 DIAGNOSIS — L65.9 HAIR LOSS: ICD-10-CM

## 2018-04-11 DIAGNOSIS — J06.9 UPPER RESPIRATORY TRACT INFECTION, UNSPECIFIED TYPE: ICD-10-CM

## 2018-04-11 DIAGNOSIS — Z00.00 ANNUAL PHYSICAL EXAM: Primary | ICD-10-CM

## 2018-04-11 PROCEDURE — 99396 PREV VISIT EST AGE 40-64: CPT | Performed by: INTERNAL MEDICINE

## 2018-04-11 NOTE — PROGRESS NOTES
Patient ID: Malina Notice is a 38year old female. Patient presents with:  Physical: Sore throat, possible pink eye       HISTORY OF PRESENT ILLNESS:   HPI  Patient presents for above.   Here for complete physical.  Complaining of on and off again sore t Narrative   None on file           PHYSICAL EXAM:      04/11/18  1400   BP: 155/67   Pulse: 55   Temp: 99.1 °F (37.3 °C)   TempSrc: Oral   Weight: 132 lb (59.9 kg)   Height: 5' 3\" (1.6 m)       Body mass index is 23.38 kg/m².     Physical Exam   Constituti

## 2018-04-13 ENCOUNTER — LAB ENCOUNTER (OUTPATIENT)
Dept: LAB | Age: 43
End: 2018-04-13
Attending: INTERNAL MEDICINE
Payer: COMMERCIAL

## 2018-04-13 DIAGNOSIS — Z00.00 ANNUAL PHYSICAL EXAM: ICD-10-CM

## 2018-04-13 PROCEDURE — 80053 COMPREHEN METABOLIC PANEL: CPT

## 2018-04-13 PROCEDURE — 80050 GENERAL HEALTH PANEL: CPT

## 2018-04-13 PROCEDURE — 80061 LIPID PANEL: CPT

## 2018-04-13 PROCEDURE — 36415 COLL VENOUS BLD VENIPUNCTURE: CPT

## 2018-04-13 PROCEDURE — 85025 COMPLETE CBC W/AUTO DIFF WBC: CPT

## 2018-05-02 ENCOUNTER — OFFICE VISIT (OUTPATIENT)
Dept: DERMATOLOGY CLINIC | Facility: CLINIC | Age: 43
End: 2018-05-02

## 2018-05-02 DIAGNOSIS — L65.0 TELOGEN EFFLUVIUM: Primary | ICD-10-CM

## 2018-05-02 DIAGNOSIS — L30.9 DERMATITIS: ICD-10-CM

## 2018-05-02 DIAGNOSIS — L81.9 DYSCHROMIA: ICD-10-CM

## 2018-05-02 PROCEDURE — 99202 OFFICE O/P NEW SF 15 MIN: CPT | Performed by: DERMATOLOGY

## 2018-05-02 PROCEDURE — 99212 OFFICE O/P EST SF 10 MIN: CPT | Performed by: DERMATOLOGY

## 2018-05-02 NOTE — PATIENT INSTRUCTIONS
B. vitamin supplementation--b complex vitamin   biotin to 10 mg daily,   vitamin D3 2000 units daily,   adequate protein intake (40 to 80 g daily)    Will  take 4 months to see any significant change

## 2018-05-12 ENCOUNTER — OFFICE VISIT (OUTPATIENT)
Dept: INTERNAL MEDICINE CLINIC | Facility: CLINIC | Age: 43
End: 2018-05-12

## 2018-05-12 VITALS
TEMPERATURE: 98 F | BODY MASS INDEX: 23.57 KG/M2 | WEIGHT: 133 LBS | HEIGHT: 63 IN | DIASTOLIC BLOOD PRESSURE: 67 MMHG | SYSTOLIC BLOOD PRESSURE: 100 MMHG | HEART RATE: 68 BPM

## 2018-05-12 DIAGNOSIS — H10.31 ACUTE CONJUNCTIVITIS OF RIGHT EYE, UNSPECIFIED ACUTE CONJUNCTIVITIS TYPE: Primary | ICD-10-CM

## 2018-05-12 PROCEDURE — 99212 OFFICE O/P EST SF 10 MIN: CPT | Performed by: INTERNAL MEDICINE

## 2018-05-12 PROCEDURE — 99213 OFFICE O/P EST LOW 20 MIN: CPT | Performed by: INTERNAL MEDICINE

## 2018-05-12 RX ORDER — TOBRAMYCIN 3 MG/ML
2 SOLUTION/ DROPS OPHTHALMIC EVERY 6 HOURS
Qty: 5 ML | Refills: 0 | Status: SHIPPED | OUTPATIENT
Start: 2018-05-12 | End: 2018-05-19

## 2018-05-12 NOTE — PROGRESS NOTES
Abi Brown is a 38year old female.  Patient presents with:  Redness: C/o redness, dryness, itchy, and sensitive to the light in the right eye     HPI:   Since last night, she has had itchiness, dryness, redness and \"goopy\" discharge from her right ey conjunctivitis type  Tobrex ophthalmic solution 2 drops right eye 4 times daily for 7 days. Prescription sent to pharmacy. Call if no better. The patient indicates understanding of these issues and agrees to the plan.     Tonja Marroquin MD  5/12/2018

## 2018-05-13 NOTE — PROGRESS NOTES
Uyen Louis is a 38year old female. Patient presents with:  Alopecia: new pt. presents with hairloss for few months - pt delivered baby in October 2017. Pt is currently nursing. Denies itching.    Derm Problem: please check brown discoloration to b pertinent family history. HPI :      Patient presents with:  Alopecia: new pt. presents with hairloss for few months - pt delivered baby in October 2017. Pt is currently nursing. Denies itching.    Derm Problem: please check brown discolor telogen effluvium related to pregnancy. Labs reviewed okay. Consider checking labs if hair loss continues. Patient has been possibly low in protein. Watch iron levels as well. Discussed.   Overall hair regimen discussed including B. vitamin supplementa of the defined types were placed in this encounter. 5/13/2018  Rinku William      The patient indicates understanding of these issues and agrees to the plan.   The patient is asked to return as noted in follow-up /as noted above

## 2019-01-03 ENCOUNTER — OFFICE VISIT (OUTPATIENT)
Dept: INTERNAL MEDICINE CLINIC | Facility: CLINIC | Age: 44
End: 2019-01-03
Payer: COMMERCIAL

## 2019-01-03 VITALS
WEIGHT: 139.81 LBS | TEMPERATURE: 99 F | HEART RATE: 69 BPM | DIASTOLIC BLOOD PRESSURE: 70 MMHG | SYSTOLIC BLOOD PRESSURE: 118 MMHG | HEIGHT: 63 IN | BODY MASS INDEX: 24.77 KG/M2

## 2019-01-03 DIAGNOSIS — J01.10 ACUTE NON-RECURRENT FRONTAL SINUSITIS: Primary | ICD-10-CM

## 2019-01-03 PROCEDURE — 99212 OFFICE O/P EST SF 10 MIN: CPT | Performed by: INTERNAL MEDICINE

## 2019-01-03 PROCEDURE — 99213 OFFICE O/P EST LOW 20 MIN: CPT | Performed by: INTERNAL MEDICINE

## 2019-01-03 RX ORDER — AMOXICILLIN AND CLAVULANATE POTASSIUM 875; 125 MG/1; MG/1
1 TABLET, FILM COATED ORAL 2 TIMES DAILY
Qty: 20 TABLET | Refills: 0 | Status: SHIPPED | OUTPATIENT
Start: 2019-01-03 | End: 2019-01-13

## 2019-01-03 NOTE — PROGRESS NOTES
Patient ID: Sowmya Decker is a 37year old female. Patient presents with:  Cough: cough,congestion, sore throat x 18 days       HISTORY OF PRESENT ILLNESS:   HPI  Patient presents for above.   Here for an 18-day history of upper respiratory tract infecti Occupational History      Not on file    Tobacco Use      Smoking status: Never Smoker      Smokeless tobacco: Never Used    Substance and Sexual Activity      Alcohol use: No      Drug use: No      Sexual activity: Not on file    Other Topics      Concern

## 2019-01-03 NOTE — PATIENT INSTRUCTIONS
1.  Take antibiotics as prescribed. 2.  Begin Flonase over-the-counter. 3.  Symptomatic treatment with hot showers and steam inhalation. Do steam inhalation on an hourly basis. 4.  Begin Claritin-D or equivalent.

## 2019-01-30 NOTE — TELEPHONE ENCOUNTER
[facility] operative report dated 6/10/15 received and placed on MARTÍNEZ's desk for review.
Op report below signed by MARTÍNEZ and sent to scanning.
negative...

## 2019-02-28 ENCOUNTER — OFFICE VISIT (OUTPATIENT)
Dept: INTERNAL MEDICINE CLINIC | Facility: CLINIC | Age: 44
End: 2019-02-28
Payer: COMMERCIAL

## 2019-02-28 VITALS
OXYGEN SATURATION: 97 % | HEIGHT: 63 IN | SYSTOLIC BLOOD PRESSURE: 99 MMHG | DIASTOLIC BLOOD PRESSURE: 64 MMHG | TEMPERATURE: 99 F | WEIGHT: 139 LBS | HEART RATE: 77 BPM | BODY MASS INDEX: 24.63 KG/M2

## 2019-02-28 DIAGNOSIS — J40 BRONCHITIS: Primary | ICD-10-CM

## 2019-02-28 LAB
CONTROL LINE PRESENT WITH A CLEAR BACKGROUND (YES/NO): YES YES/NO
KIT LOT #: NORMAL NUMERIC

## 2019-02-28 PROCEDURE — 87880 STREP A ASSAY W/OPTIC: CPT | Performed by: INTERNAL MEDICINE

## 2019-02-28 PROCEDURE — 99213 OFFICE O/P EST LOW 20 MIN: CPT | Performed by: INTERNAL MEDICINE

## 2019-02-28 PROCEDURE — 99212 OFFICE O/P EST SF 10 MIN: CPT | Performed by: INTERNAL MEDICINE

## 2019-02-28 RX ORDER — METHYLPREDNISOLONE 4 MG/1
TABLET ORAL
Qty: 1 KIT | Refills: 0 | Status: SHIPPED | OUTPATIENT
Start: 2019-02-28 | End: 2019-11-20 | Stop reason: ALTCHOICE

## 2019-02-28 NOTE — PROGRESS NOTES
Patient ID: Francesco Gilbert is a 37year old female. Patient presents with:  Cough: x1 week  per patient cough with phlegm and chest soreness. SOB       HISTORY OF PRESENT ILLNESS:   HPI  Patient presents for above.   There is a 1 week history of nonproduc file    Lifestyle      Physical activity:        Days per week: Not on file        Minutes per session: Not on file      Stress: Not on file    Relationships      Social connections:        Talks on phone: Not on file        Gets together: Not on file preauricular, no posterior auricular and no occipital adenopathy present. Neurological: She is alert. Psychiatric: She has a normal mood and affect. Her behavior is normal.         ASSESSMENT/PLAN:   1.  Bronchitis  · STREP A ASSAY W/OPTIC  · methylPRED

## 2019-07-05 ENCOUNTER — TELEPHONE (OUTPATIENT)
Dept: GASTROENTEROLOGY | Facility: CLINIC | Age: 44
End: 2019-07-05

## 2019-07-05 ENCOUNTER — OFFICE VISIT (OUTPATIENT)
Dept: GASTROENTEROLOGY | Facility: CLINIC | Age: 44
End: 2019-07-05
Payer: COMMERCIAL

## 2019-07-05 VITALS
HEART RATE: 70 BPM | SYSTOLIC BLOOD PRESSURE: 109 MMHG | BODY MASS INDEX: 24.63 KG/M2 | HEIGHT: 63 IN | DIASTOLIC BLOOD PRESSURE: 67 MMHG | WEIGHT: 139 LBS

## 2019-07-05 DIAGNOSIS — R10.9 ABDOMINAL PAIN, UNSPECIFIED ABDOMINAL LOCATION: ICD-10-CM

## 2019-07-05 DIAGNOSIS — R19.8 CHANGE IN BOWEL FUNCTION: Primary | ICD-10-CM

## 2019-07-05 DIAGNOSIS — R10.13 DYSPEPSIA: ICD-10-CM

## 2019-07-05 DIAGNOSIS — Z12.11 COLON CANCER SCREENING: Primary | ICD-10-CM

## 2019-07-05 PROCEDURE — 99243 OFF/OP CNSLTJ NEW/EST LOW 30: CPT | Performed by: INTERNAL MEDICINE

## 2019-07-05 NOTE — TELEPHONE ENCOUNTER
Scheduled for:  Colonoscopy [phone] and EGD [id number]  Provider Name: Dr. Annika Holly  Date:  8/14/19  Location:  [facility]  Sedation:  MAC  Time:   2911 (pt is aware to arrive at 0930)   Prep:  Suprep  Meds/Allergies Reconciled?:  Physician reviewed   Diagnosis with codes:

## 2019-07-05 NOTE — PATIENT INSTRUCTIONS
1. Schedule colonoscopy/EGD with MAC [Diagnosis: Screening, dyspepsia, abdominal pain]  -eat a general diet for a few weeks    2.  bowel prep from pharmacy (split suprep)    3. Continue all medications for procedure    4.  Read all bowel prep instruc

## 2019-07-05 NOTE — H&P
[facility] - Gastroenterology                                                                                                               Reason for consult: c tobacco: Never Used    Alcohol use: No    Drug use: No       Medications (Active prior to today's visit):    Current Outpatient Medications:  Na Sulfate-K Sulfate-Mg Sulf (SUPREP BOWEL PREP KIT) 17.5-3.13-1.6 GM/177ML Oral Solution Take as directed Disp: 1 in bowels. #Bloating/constipation  #Burping   #Change in bowels  #Lactose intolerance? #Screening    Vague symptoms of change in bowels, more constipation, more bloating, possible lactose intolerance.   She claims complains of epigastric pain and burpi correct errors during dictation, discrepancies may still exist.

## 2019-08-12 ENCOUNTER — TELEPHONE (OUTPATIENT)
Dept: ENDOCRINOLOGY CLINIC | Facility: CLINIC | Age: 44
End: 2019-08-12

## 2019-08-12 NOTE — TELEPHONE ENCOUNTER
Pt states that she has been coughing since Friday night and is having colon/EGD on 8/14/19. Will this be a problem? Please call.

## 2019-08-12 NOTE — TELEPHONE ENCOUNTER
Pt contacted and states she has a dry cough since Friday and has been getting better, \"fine throughout the day, but more bothersome at night. \" States she does not have any other sxs or feels sick at all w/ the exception of the cough.      Denies fever, ch

## 2019-08-14 ENCOUNTER — ANESTHESIA (OUTPATIENT)
Dept: ENDOSCOPY | Facility: HOSPITAL | Age: 44
End: 2019-08-14
Payer: COMMERCIAL

## 2019-08-14 ENCOUNTER — HOSPITAL ENCOUNTER (OUTPATIENT)
Facility: HOSPITAL | Age: 44
Setting detail: HOSPITAL OUTPATIENT SURGERY
Discharge: HOME | End: 2019-08-14
Attending: INTERNAL MEDICINE | Admitting: INTERNAL MEDICINE
Payer: COMMERCIAL

## 2019-08-14 ENCOUNTER — ANESTHESIA EVENT (OUTPATIENT)
Dept: ENDOSCOPY | Facility: HOSPITAL | Age: 44
End: 2019-08-14
Payer: COMMERCIAL

## 2019-08-14 ENCOUNTER — TELEPHONE (OUTPATIENT)
Dept: GASTROENTEROLOGY | Facility: CLINIC | Age: 44
End: 2019-08-14

## 2019-08-14 VITALS
HEIGHT: 63 IN | WEIGHT: 140 LBS | RESPIRATION RATE: 17 BRPM | HEART RATE: 69 BPM | SYSTOLIC BLOOD PRESSURE: 92 MMHG | BODY MASS INDEX: 24.8 KG/M2 | OXYGEN SATURATION: 99 % | DIASTOLIC BLOOD PRESSURE: 49 MMHG

## 2019-08-14 DIAGNOSIS — Z12.11 COLON CANCER SCREENING: ICD-10-CM

## 2019-08-14 DIAGNOSIS — R14.0 ABDOMINAL BLOATING: ICD-10-CM

## 2019-08-14 DIAGNOSIS — R10.10 UPPER ABDOMINAL PAIN: Primary | ICD-10-CM

## 2019-08-14 DIAGNOSIS — R10.9 ABDOMINAL PAIN, UNSPECIFIED ABDOMINAL LOCATION: ICD-10-CM

## 2019-08-14 DIAGNOSIS — R10.13 DYSPEPSIA: ICD-10-CM

## 2019-08-14 LAB — B-HCG UR QL: NEGATIVE

## 2019-08-14 PROCEDURE — 43239 EGD BIOPSY SINGLE/MULTIPLE: CPT | Performed by: INTERNAL MEDICINE

## 2019-08-14 PROCEDURE — 0DBE8ZX EXCISION OF LARGE INTESTINE, VIA NATURAL OR ARTIFICIAL OPENING ENDOSCOPIC, DIAGNOSTIC: ICD-10-PCS | Performed by: INTERNAL MEDICINE

## 2019-08-14 PROCEDURE — 0DB68ZX EXCISION OF STOMACH, VIA NATURAL OR ARTIFICIAL OPENING ENDOSCOPIC, DIAGNOSTIC: ICD-10-PCS | Performed by: INTERNAL MEDICINE

## 2019-08-14 PROCEDURE — 45380 COLONOSCOPY AND BIOPSY: CPT | Performed by: INTERNAL MEDICINE

## 2019-08-14 PROCEDURE — 0DB98ZX EXCISION OF DUODENUM, VIA NATURAL OR ARTIFICIAL OPENING ENDOSCOPIC, DIAGNOSTIC: ICD-10-PCS | Performed by: INTERNAL MEDICINE

## 2019-08-14 RX ORDER — SODIUM CHLORIDE, SODIUM LACTATE, POTASSIUM CHLORIDE, CALCIUM CHLORIDE 600; 310; 30; 20 MG/100ML; MG/100ML; MG/100ML; MG/100ML
INJECTION, SOLUTION INTRAVENOUS CONTINUOUS
Status: DISCONTINUED | OUTPATIENT
Start: 2019-08-14 | End: 2019-08-14

## 2019-08-14 RX ORDER — LIDOCAINE HYDROCHLORIDE 20 MG/ML
INJECTION, SOLUTION EPIDURAL; INFILTRATION; INTRACAUDAL; PERINEURAL AS NEEDED
Status: DISCONTINUED | OUTPATIENT
Start: 2019-08-14 | End: 2019-08-14 | Stop reason: SURG

## 2019-08-14 RX ORDER — PANTOPRAZOLE SODIUM 40 MG/1
40 TABLET, DELAYED RELEASE ORAL
Qty: 30 TABLET | Refills: 2 | Status: SHIPPED | OUTPATIENT
Start: 2019-08-14 | End: 2019-09-13

## 2019-08-14 RX ORDER — NALOXONE HYDROCHLORIDE 0.4 MG/ML
80 INJECTION, SOLUTION INTRAMUSCULAR; INTRAVENOUS; SUBCUTANEOUS AS NEEDED
Status: DISCONTINUED | OUTPATIENT
Start: 2019-08-14 | End: 2019-08-14

## 2019-08-14 RX ORDER — GLYCOPYRROLATE 0.2 MG/ML
INJECTION INTRAMUSCULAR; INTRAVENOUS AS NEEDED
Status: DISCONTINUED | OUTPATIENT
Start: 2019-08-14 | End: 2019-08-14 | Stop reason: SURG

## 2019-08-14 RX ADMIN — LIDOCAINE HYDROCHLORIDE 25 MG: 20 INJECTION, SOLUTION EPIDURAL; INFILTRATION; INTRACAUDAL; PERINEURAL at 10:59:00

## 2019-08-14 RX ADMIN — GLYCOPYRROLATE 0.2 MG: 0.2 INJECTION INTRAMUSCULAR; INTRAVENOUS at 10:54:00

## 2019-08-14 RX ADMIN — SODIUM CHLORIDE, SODIUM LACTATE, POTASSIUM CHLORIDE, CALCIUM CHLORIDE: 600; 310; 30; 20 INJECTION, SOLUTION INTRAVENOUS at 11:25:00

## 2019-08-14 RX ADMIN — SODIUM CHLORIDE, SODIUM LACTATE, POTASSIUM CHLORIDE, CALCIUM CHLORIDE: 600; 310; 30; 20 INJECTION, SOLUTION INTRAVENOUS at 10:53:00

## 2019-08-14 NOTE — H&P
History & Physical Examination    Patient Name: Leroy Fuentes  MRN: [id number]  [facility]: [id number]  YOB: 1975    Diagnosis: screening, epigastric pain, dyspepsia, change in bowels      Medications Prior to Admission:  Na Sulfate-K Sulfate-Mg

## 2019-08-14 NOTE — ANESTHESIA PREPROCEDURE EVALUATION
Anesthesia PreOp Note    HPI:     Maryln Denver is a 53year old female who presents for preoperative consultation requested by: Berna Singh MD    Date of Surgery: 8/14/2019    Procedure(s):  COLONOSCOPY  ESOPHAGOGASTRODUODENOSCOPY (EGD)  Indicatio Non-medical: Not on file    Tobacco Use      Smoking status: Never Smoker      Smokeless tobacco: Never Used    Substance and Sexual Activity      Alcohol use: Yes        Comment:  Occ      Drug use: No      Sexual activity: Not on file    Lifestyle      Ph Neuro/Psych      GI/Hepatic/Renal      Endo/Other    Abdominal                Anesthesia Plan:   ASA:  1  Plan:   MAC  Informed Consent Plan and Risks Discussed With:  Patient  Discussed plan with:  CRNA      I have informed Satish Hernandez and/or legal g

## 2019-08-14 NOTE — ADDENDUM NOTE
Addendum  created 08/14/19 1202 by Radha Kellogg, CRNA    Intraprocedure Event deleted, Intraprocedure Event edited

## 2019-08-14 NOTE — ANESTHESIA POSTPROCEDURE EVALUATION
Patient: Aramis Meek    Procedure Summary     Date:  08/14/19 Room / Location:  [facility] ENDOSCOPY 01 / [facility] ENDOSCOPY    Anesthesia Start:  6706 Anesthesia Stop:  1245    Procedures:       COLONOSCOPY (N/A )      ESOPHAGOGASTRODUODENOSCOPY (EGD) (N/A ) Shiela

## 2019-08-14 NOTE — TELEPHONE ENCOUNTER
EGD/CLN done, waiting for pathology.  Recommend patient proceed with Ab US, ordered    GI Clinical Staff:   Please call patient and give instructions on how to set up Viru 65 as ordered

## 2019-08-14 NOTE — OPERATIVE REPORT
ESOPHAGOGASTRODUODENOSCOPY (EGD) & COLONOSCOPY REPORT    Teri Guerrero    DMITRI 1975 Age 53year old   PCP Remy Grossman MD Endoscopist Cally Goodwin MD     Date of procedure: 19    Procedure: EGD w/cold biopsy & Colonoscopy w/cold biopsy instrument from the patient who tolerated the procedure well. Complications: None    EGD findings:      1. Esophagus: The squamocolumnar junction was noted at 40 cm and appeared regular.  The diaphragmatic pinch was noted noted at 40 cm from the incisor [phone].     Specimens: colon, gastric, duodenal

## 2019-08-15 NOTE — TELEPHONE ENCOUNTER
Spoke to pt, relayed Dr. Laurita Lui message regarding recommending ab US. Pt was provided with central scheduling phone number. Pt verbalized understanding of whole message and had no further questions at this time.

## 2019-08-28 ENCOUNTER — TELEPHONE (OUTPATIENT)
Dept: GASTROENTEROLOGY | Facility: CLINIC | Age: 44
End: 2019-08-28

## 2019-09-21 ENCOUNTER — HOSPITAL ENCOUNTER (OUTPATIENT)
Dept: ULTRASOUND IMAGING | Facility: HOSPITAL | Age: 44
Discharge: HOME | End: 2019-09-21
Attending: INTERNAL MEDICINE
Payer: COMMERCIAL

## 2019-09-21 DIAGNOSIS — R10.10 UPPER ABDOMINAL PAIN: ICD-10-CM

## 2019-09-21 DIAGNOSIS — R14.0 ABDOMINAL BLOATING: ICD-10-CM

## 2019-09-21 PROCEDURE — 76700 US EXAM ABDOM COMPLETE: CPT | Performed by: INTERNAL MEDICINE

## 2019-11-18 ENCOUNTER — TELEPHONE (OUTPATIENT)
Dept: OBGYN CLINIC | Facility: CLINIC | Age: 44
End: 2019-11-18

## 2019-11-18 NOTE — TELEPHONE ENCOUNTER
LAST SEEN FOR POST PARTUM 11-20-17. ADVISED SHE NEEDS TO BE SEEN FOR ANNUAL EXAM TO GET MAMMO ORDER. BOOKED APPT ON 12-3-19 AT [facility].

## 2019-11-20 ENCOUNTER — OFFICE VISIT (OUTPATIENT)
Dept: INTERNAL MEDICINE CLINIC | Facility: CLINIC | Age: 44
End: 2019-11-20
Payer: COMMERCIAL

## 2019-11-20 ENCOUNTER — TELEPHONE (OUTPATIENT)
Dept: INTERNAL MEDICINE CLINIC | Facility: CLINIC | Age: 44
End: 2019-11-20

## 2019-11-20 VITALS
TEMPERATURE: 100 F | HEIGHT: 63 IN | SYSTOLIC BLOOD PRESSURE: 115 MMHG | BODY MASS INDEX: 25.16 KG/M2 | DIASTOLIC BLOOD PRESSURE: 71 MMHG | WEIGHT: 142 LBS | HEART RATE: 71 BPM

## 2019-11-20 DIAGNOSIS — R07.0 THROAT PAIN: Primary | ICD-10-CM

## 2019-11-20 PROCEDURE — 87880 STREP A ASSAY W/OPTIC: CPT | Performed by: INTERNAL MEDICINE

## 2019-11-20 PROCEDURE — 99213 OFFICE O/P EST LOW 20 MIN: CPT | Performed by: INTERNAL MEDICINE

## 2019-11-20 RX ORDER — PANTOPRAZOLE SODIUM 40 MG/1
40 TABLET, DELAYED RELEASE ORAL
COMMUNITY
End: 2020-03-09

## 2019-11-20 NOTE — TELEPHONE ENCOUNTER
Saw Dr. Jacoby Mckinney this morning and recommended she see ENT and forgot to mention something and wants to speak with him. Please advise.

## 2019-11-20 NOTE — TELEPHONE ENCOUNTER
I looked at her EGD report and there were no findings near the area she is having issues with. She does have an enlarged lymph node but that does not imply infection, it implies inflammation. I would rather her see the ENT doctor for further evaluation.

## 2019-11-20 NOTE — TELEPHONE ENCOUNTER
Spoke with patient RE: message below:    Patient reports she spoke to her  when she got home from this morning's appt--\"My  is a rep in the GI field.  He reminded me that I just had an endoscopy in August. If I had the throat pain for so long

## 2019-11-20 NOTE — PROGRESS NOTES
Patient ID: Teri Guerrero is a 53year old female. Patient presents with:  Sore Throat: x3 weeks. Per patient sore throat on one spot on lower neck/throat area. On and off, worse at night.         HISTORY OF PRESENT ILLNESS:   HPI  Patient presents fo resource strain: Not on file      Food insecurity:        Worry: Not on file        Inability: Not on file      Transportation needs:        Medical: Not on file        Non-medical: Not on file    Tobacco Use      Smoking status: Never Smoker      Smokeles abscesses. Cardiovascular: Normal rate, regular rhythm and normal heart sounds. Pulmonary/Chest: Effort normal and breath sounds normal. No respiratory distress. Lymphadenopathy:        Head (right side): Tonsillar adenopathy present.  No submental, n

## 2019-11-21 NOTE — TELEPHONE ENCOUNTER
Spoke with patient ( verified) and relayed Dr. Jailene Galan message below--patient verbalizes understanding and agreement and will keep ENT appt as scheduled. No further questions/concerns at this time.

## 2019-11-25 ENCOUNTER — OFFICE VISIT (OUTPATIENT)
Dept: INTERNAL MEDICINE CLINIC | Facility: CLINIC | Age: 44
End: 2019-11-25
Payer: COMMERCIAL

## 2019-11-25 VITALS
WEIGHT: 142 LBS | TEMPERATURE: 99 F | SYSTOLIC BLOOD PRESSURE: 97 MMHG | HEIGHT: 63 IN | DIASTOLIC BLOOD PRESSURE: 58 MMHG | HEART RATE: 63 BPM | BODY MASS INDEX: 25.16 KG/M2

## 2019-11-25 DIAGNOSIS — Z23 NEED FOR VACCINATION: ICD-10-CM

## 2019-11-25 DIAGNOSIS — Z00.00 ANNUAL PHYSICAL EXAM: Primary | ICD-10-CM

## 2019-11-25 PROCEDURE — 99396 PREV VISIT EST AGE 40-64: CPT | Performed by: INTERNAL MEDICINE

## 2019-11-25 PROCEDURE — 90686 IIV4 VACC NO PRSV 0.5 ML IM: CPT | Performed by: INTERNAL MEDICINE

## 2019-11-25 PROCEDURE — 90471 IMMUNIZATION ADMIN: CPT | Performed by: INTERNAL MEDICINE

## 2019-11-25 NOTE — PATIENT INSTRUCTIONS
Prevention Guidelines, Women Ages 36 to 52  Screening tests and vaccines are an important part of managing your health. A screening test is done to find diseases in people who don't have any symptoms.  The goal is to find a disease early so lifestyle change sigmoidoscopy every 5 years, or  · Colonoscopy every 10 years, or  · CT colonography (virtual colonoscopy) every 5 years, or  · Yearly fecal occult blood test, or  · Yearly fecal immunochemical test every year, or  · Stool DNA test, every 3 years  If you c least 4 weeks after the first dose   Hepatitis A Women at increased risk for infection–talk with your healthcare provider 2 doses given 6 months apart   Hepatitis B Women at increased risk for infection–talk with your healthcare provider 3 doses over 6 mon [facility]  Date Last Reviewed: 11/1/2017  © 1581-9941 [employer]. [address] [facility], [facility] [place]. All rights reserved. This information is not intended as a substitute for professional medical care.  Always

## 2019-11-25 NOTE — PROGRESS NOTES
Patient ID: Maryln Denver is a 53year old female. Patient presents with:  Physical       HISTORY OF PRESENT ILLNESS:   HPI  Patient presents for above. Here for her annual physical exam.    No complaints of at this time.   Did have an EGD/colonoscopy Allergies:  Demerol Hcl [Meperi*    RASH    Social History    Socioeconomic History      Marital status:       Spouse name: Not on file      Number of children: Not on file      Years of education: Not on file      Highest education level: Not o Constitutional: She is oriented to person, place, and time. She appears well-developed and well-nourished. HENT:   Head: Normocephalic and atraumatic. Mouth/Throat: Oropharynx is clear and moist. No oropharyngeal exudate.    Eyes: Pupils are equal, ro

## 2019-11-26 ENCOUNTER — LAB ENCOUNTER (OUTPATIENT)
Dept: LAB | Age: 44
End: 2019-11-26
Attending: INTERNAL MEDICINE
Payer: COMMERCIAL

## 2019-11-26 DIAGNOSIS — Z00.00 ANNUAL PHYSICAL EXAM: ICD-10-CM

## 2019-11-26 PROCEDURE — 85025 COMPLETE CBC W/AUTO DIFF WBC: CPT

## 2019-11-26 PROCEDURE — 36415 COLL VENOUS BLD VENIPUNCTURE: CPT

## 2019-11-26 PROCEDURE — 80053 COMPREHEN METABOLIC PANEL: CPT

## 2019-11-26 PROCEDURE — 80061 LIPID PANEL: CPT

## 2019-11-26 PROCEDURE — 84443 ASSAY THYROID STIM HORMONE: CPT

## 2019-12-09 ENCOUNTER — OFFICE VISIT (OUTPATIENT)
Dept: OBGYN CLINIC | Facility: CLINIC | Age: 44
End: 2019-12-09
Payer: COMMERCIAL

## 2019-12-09 VITALS
SYSTOLIC BLOOD PRESSURE: 102 MMHG | BODY MASS INDEX: 25 KG/M2 | DIASTOLIC BLOOD PRESSURE: 67 MMHG | WEIGHT: 142.63 LBS | HEART RATE: 73 BPM

## 2019-12-09 DIAGNOSIS — Z12.4 CERVICAL CANCER SCREENING: ICD-10-CM

## 2019-12-09 DIAGNOSIS — Z01.419 ENCOUNTER FOR GYNECOLOGICAL EXAMINATION WITHOUT ABNORMAL FINDING: Primary | ICD-10-CM

## 2019-12-09 DIAGNOSIS — Z12.31 ENCOUNTER FOR SCREENING MAMMOGRAM FOR BREAST CANCER: ICD-10-CM

## 2019-12-09 PROCEDURE — 99396 PREV VISIT EST AGE 40-64: CPT | Performed by: OBSTETRICS & GYNECOLOGY

## 2019-12-09 NOTE — PROGRESS NOTES
Well Woman Exam    HPI:  The patient is a 51yo female who presents today for annual exam. She has no complaints. Her youngest is now two years old. She is no longer breast feeding. She is feeling well. Her menses are now regular. She denies dysmenorrhea. Minutes per session: Not on file      Stress: Not on file    Relationships      Social connections:        Talks on phone: Not on file        Gets together: Not on file        Attends Mu-ism service: Not on file        Active member of club or organ normocephalic  Neck/Thyroid: thyroid symmetric, no thyromegaly, no nodules, no adenopathy  Heart: Regular rate and rhythm   Lungs: clear to ascultation bilaterally   Lymphatic:no abnormal supraclavicular or axillary adenopathy is noted  Breast: normal with

## 2019-12-23 ENCOUNTER — OFFICE VISIT (OUTPATIENT)
Dept: INTERNAL MEDICINE CLINIC | Facility: CLINIC | Age: 44
End: 2019-12-23
Payer: COMMERCIAL

## 2019-12-23 VITALS
WEIGHT: 142 LBS | TEMPERATURE: 99 F | OXYGEN SATURATION: 98 % | BODY MASS INDEX: 25.16 KG/M2 | HEIGHT: 63 IN | SYSTOLIC BLOOD PRESSURE: 117 MMHG | HEART RATE: 90 BPM | DIASTOLIC BLOOD PRESSURE: 75 MMHG

## 2019-12-23 DIAGNOSIS — J06.9 UPPER RESPIRATORY TRACT INFECTION, UNSPECIFIED TYPE: Primary | ICD-10-CM

## 2019-12-23 PROCEDURE — 99213 OFFICE O/P EST LOW 20 MIN: CPT | Performed by: INTERNAL MEDICINE

## 2019-12-23 RX ORDER — AMOXICILLIN AND CLAVULANATE POTASSIUM 875; 125 MG/1; MG/1
1 TABLET, FILM COATED ORAL 2 TIMES DAILY
Qty: 14 TABLET | Refills: 0 | Status: SHIPPED | OUTPATIENT
Start: 2019-12-23 | End: 2019-12-30

## 2019-12-23 NOTE — PROGRESS NOTES
Patient ID: Maria Elena Kaiser is a 53year old female. Patient presents with:  Cough: cough and sore thorat, x1 week. Sore throat worse at night. HISTORY OF PRESENT ILLNESS:   HPI  Patient presents for above.   Here with sore throat for the past 1 w Occupational History      Not on file    Social Needs      Financial resource strain: Not on file      Food insecurity:        Worry: Not on file        Inability: Not on file      Transportation needs:        Medical: Not on file        Non-medical: Not o maxillary sinus tenderness and no frontal sinus tenderness. Left sinus exhibits no maxillary sinus tenderness and no frontal sinus tenderness. Mouth/Throat: Posterior oropharyngeal erythema present.  No oropharyngeal exudate, posterior oropharyngeal edema

## 2020-01-03 ENCOUNTER — HOSPITAL ENCOUNTER (OUTPATIENT)
Dept: MAMMOGRAPHY | Age: 45
Discharge: HOME | End: 2020-01-03
Attending: OBSTETRICS & GYNECOLOGY
Payer: COMMERCIAL

## 2020-01-03 DIAGNOSIS — Z12.31 ENCOUNTER FOR SCREENING MAMMOGRAM FOR BREAST CANCER: ICD-10-CM

## 2020-01-03 PROCEDURE — 77063 BREAST TOMOSYNTHESIS BI: CPT | Performed by: OBSTETRICS & GYNECOLOGY

## 2020-01-03 PROCEDURE — 77067 SCR MAMMO BI INCL CAD: CPT | Performed by: OBSTETRICS & GYNECOLOGY

## 2020-01-06 ENCOUNTER — TELEPHONE (OUTPATIENT)
Dept: OBGYN CLINIC | Facility: CLINIC | Age: 45
End: 2020-01-06

## 2020-01-06 NOTE — TELEPHONE ENCOUNTER
----- Message from Lulu Garcia MD sent at 1/4/2020 12:45 PM CST -----  Incomplete mammogram. Please let patient know and order additional views

## 2020-01-06 NOTE — TELEPHONE ENCOUNTER
Pt informed of results and recs and verbalized understanding. Pt states she had prior mammo done at [facility]. Advised pt to notify mammo that the Mammo report done at [facility] in 2016 is available in care everywhere.

## 2020-01-15 ENCOUNTER — HOSPITAL ENCOUNTER (OUTPATIENT)
Dept: MAMMOGRAPHY | Facility: HOSPITAL | Age: 45
Discharge: HOME | End: 2020-01-15
Attending: OBSTETRICS & GYNECOLOGY
Payer: COMMERCIAL

## 2020-01-15 DIAGNOSIS — R92.8 ABNORMAL MAMMOGRAM: ICD-10-CM

## 2020-01-15 PROCEDURE — 77061 BREAST TOMOSYNTHESIS UNI: CPT | Performed by: OBSTETRICS & GYNECOLOGY

## 2020-01-15 PROCEDURE — 77065 DX MAMMO INCL CAD UNI: CPT | Performed by: OBSTETRICS & GYNECOLOGY

## 2020-01-17 ENCOUNTER — TELEPHONE (OUTPATIENT)
Dept: OBGYN CLINIC | Facility: CLINIC | Age: 45
End: 2020-01-17

## 2020-01-17 NOTE — TELEPHONE ENCOUNTER
PER PATIENT REQUESTING A NURSE CALL BACK / BECAUSE SHE DO NOT KNOW WHY SHE WAS SCHEDULE FOR AN PROCEDURE / PT STATE SHE WILL BE AVAILABLE AFTER 11:30 / PLEASE ADVISE

## 2020-01-17 NOTE — TELEPHONE ENCOUNTER
Pt informed that she does not need a colpo. Her pap and HPV from 12/9/2019 was neg. Informed pt that I will cancel her appt. Pt thankful and pleasant.

## 2020-03-09 RX ORDER — PANTOPRAZOLE SODIUM 40 MG/1
TABLET, DELAYED RELEASE ORAL
Qty: 30 TABLET | Refills: 2 | Status: SHIPPED | OUTPATIENT
Start: 2020-03-09 | End: 2020-06-10

## 2020-03-09 NOTE — TELEPHONE ENCOUNTER
Requested Prescriptions     Pending Prescriptions Disp Refills   • PANTOPRAZOLE SODIUM 40 MG Oral Tab EC [Pharmacy Med Name: PANTOPRAZOLE SOD DR 40 MG TAB] 30 tablet 2     Sig: TAKE 1 TABLET BY MOUTH EVERY DAY IN THE MORNING BEFORE BREAKFAST     Last seen

## 2020-06-10 RX ORDER — PANTOPRAZOLE SODIUM 40 MG/1
40 TABLET, DELAYED RELEASE ORAL
Qty: 90 TABLET | Refills: 2 | Status: SHIPPED | OUTPATIENT
Start: 2020-06-10 | End: 2020-09-08

## 2020-07-02 ENCOUNTER — TELEPHONE (OUTPATIENT)
Dept: FAMILY MEDICINE CLINIC | Facility: CLINIC | Age: 45
End: 2020-07-02

## 2020-07-02 ENCOUNTER — LAB ENCOUNTER (OUTPATIENT)
Dept: LAB | Facility: HOSPITAL | Age: 45
End: 2020-07-02
Attending: NURSE PRACTITIONER
Payer: COMMERCIAL

## 2020-07-02 DIAGNOSIS — Z20.822 COVID-19 RULED OUT: Primary | ICD-10-CM

## 2020-07-02 DIAGNOSIS — Z11.59 ENCOUNTER FOR SCREENING FOR OTHER VIRAL DISEASES: ICD-10-CM

## 2020-07-02 DIAGNOSIS — Z11.59 ENCOUNTER FOR SCREENING FOR OTHER VIRAL DISEASES: Primary | ICD-10-CM

## 2020-07-02 LAB — SARS-COV-2 RNA RESP QL NAA+PROBE: NOT DETECTED

## 2020-07-02 NOTE — TELEPHONE ENCOUNTER
Pt calling COVID hotline. States she has cough and sore throat starting Saturday. She was just notified by her  that the  was exposed to [employer] and is getting tested.  Pt is requesting COVID test. Advised to self isolate, discussed comfo

## 2020-11-21 ENCOUNTER — HOSPITAL ENCOUNTER (OUTPATIENT)
Age: 45
Discharge: HOME | End: 2020-11-21
Attending: EMERGENCY MEDICINE
Payer: COMMERCIAL

## 2020-11-21 VITALS
SYSTOLIC BLOOD PRESSURE: 122 MMHG | TEMPERATURE: 98 F | RESPIRATION RATE: 20 BRPM | DIASTOLIC BLOOD PRESSURE: 57 MMHG | OXYGEN SATURATION: 100 % | HEART RATE: 74 BPM | WEIGHT: 144 LBS | BODY MASS INDEX: 26 KG/M2

## 2020-11-21 DIAGNOSIS — Z20.822 ENCOUNTER FOR SCREENING LABORATORY TESTING FOR COVID-19 VIRUS: Primary | ICD-10-CM

## 2020-11-21 PROCEDURE — 99213 OFFICE O/P EST LOW 20 MIN: CPT | Performed by: EMERGENCY MEDICINE

## 2020-11-21 RX ORDER — PANTOPRAZOLE SODIUM 40 MG/1
40 TABLET, DELAYED RELEASE ORAL
COMMUNITY
Start: 2020-09-26 | End: 2021-03-31

## 2020-11-21 NOTE — ED PROVIDER NOTES
Patient Seen in: [facility] [place]    History   Patient presents with:  Testing    Stated Complaint: testing    HPI    Patient complains of Covid exposure, asymptomatic.     Alleviating factors:   Exacerbating factors:       Past Medical History:   D EYES: PERRLA, EOMI, conj sclera clear  THROAT: mmm, no lesions  NECK: supple, no meningeal signs  LUNGS: no resp distress, cta bilateral  CARDIO: RRR without murmur  GI: abdomen is soft and non tender, no masses, nl bowel sounds   EXTREMITIES: from, 5/5

## 2021-02-19 ENCOUNTER — OFFICE VISIT (OUTPATIENT)
Dept: INTERNAL MEDICINE CLINIC | Facility: CLINIC | Age: 46
End: 2021-02-19
Payer: COMMERCIAL

## 2021-02-19 VITALS
DIASTOLIC BLOOD PRESSURE: 73 MMHG | HEART RATE: 70 BPM | SYSTOLIC BLOOD PRESSURE: 121 MMHG | HEIGHT: 63 IN | TEMPERATURE: 99 F | WEIGHT: 144 LBS | BODY MASS INDEX: 25.52 KG/M2

## 2021-02-19 DIAGNOSIS — R06.02 SHORTNESS OF BREATH: ICD-10-CM

## 2021-02-19 DIAGNOSIS — Z00.00 ANNUAL PHYSICAL EXAM: Primary | ICD-10-CM

## 2021-02-19 DIAGNOSIS — R10.13 DYSPEPSIA: ICD-10-CM

## 2021-02-19 PROCEDURE — 3008F BODY MASS INDEX DOCD: CPT | Performed by: INTERNAL MEDICINE

## 2021-02-19 PROCEDURE — 3074F SYST BP LT 130 MM HG: CPT | Performed by: INTERNAL MEDICINE

## 2021-02-19 PROCEDURE — 99396 PREV VISIT EST AGE 40-64: CPT | Performed by: INTERNAL MEDICINE

## 2021-02-19 PROCEDURE — 3078F DIAST BP <80 MM HG: CPT | Performed by: INTERNAL MEDICINE

## 2021-02-19 NOTE — PATIENT INSTRUCTIONS
Prevention Guidelines, Women Ages 36 to 52  Screening tests and vaccines are an important part of managing your health. A screening test is done to find diseases in people who don't have any symptoms.  The goal is to find a disease early so lifestyle change sigmoidoscopy every 5 years, or  · Colonoscopy every 10 years, or  · CT colonography (virtual colonoscopy) every 5 years, or  · Yearly fecal occult blood test, or  · Yearly fecal immunochemical test every year, or  · Stool DNA test, every 3 years  If you c least 4 weeks after the first dose   Hepatitis A Women at increased risk for infection–talk with your healthcare provider 2 doses given 6 months apart   Hepatitis B Women at increased risk for infection–talk with your healthcare provider 3 doses over 6 mon [facility]  Date Last Reviewed: 11/1/2017  © 3376-9630 [employer] [address]. [address] [facility] [place]. All rights reserved. This information is not intended as a substitute for professional medical care.  Always

## 2021-02-19 NOTE — PROGRESS NOTES
Patient ID: Rhiannon Elder is a 50year old female. Patient presents with:  Physical       HISTORY OF PRESENT ILLNESS:   HPI  Patient presents for above.   Here for her annual physical exam.     Complains of occasionally getting short of breath and not Performed by Kenya Johnson DO at [address] [place] [address] L+D OR   • COLONOSCOPY N/A 8/14/2019    Performed by Yovanny Chase MD at [address] [place] [address] ENDOSCOPY   • ESOPHAGOGASTRODUODENOSCOPY (EGD) N/A 8/14/2019    Performed by Yovanny Chase MD at [address] [place] [address] ENDOSCOPY   • NEEDLE BIOPSY LEF Physically abused: Not on file        Forced sexual activity: Not on file    Other Topics      Concerns:        Grew up on a farm: Not Asked        History of tanning: Not Asked        Outdoor occupation: Not Asked        Pt has a pacemaker: No        Pt h breath  · EKG 12-LEAD; Future  · Pulmonary exam is fine. We will hold off on x-rays.     Return in about 1 year (around 2/19/2022) for Complete physical.    Cheli Marion MD  2/19/2021

## 2021-02-26 ENCOUNTER — LAB ENCOUNTER (OUTPATIENT)
Dept: LAB | Age: 46
End: 2021-02-26
Attending: INTERNAL MEDICINE
Payer: COMMERCIAL

## 2021-02-26 ENCOUNTER — EKG ENCOUNTER (OUTPATIENT)
Dept: LAB | Age: 46
End: 2021-02-26
Attending: INTERNAL MEDICINE
Payer: COMMERCIAL

## 2021-02-26 DIAGNOSIS — Z00.00 ANNUAL PHYSICAL EXAM: ICD-10-CM

## 2021-02-26 DIAGNOSIS — R06.02 SHORTNESS OF BREATH: Primary | ICD-10-CM

## 2021-02-26 DIAGNOSIS — R06.02 SHORTNESS OF BREATH: ICD-10-CM

## 2021-02-26 PROCEDURE — 93005 ELECTROCARDIOGRAM TRACING: CPT

## 2021-02-26 PROCEDURE — 93010 ELECTROCARDIOGRAM REPORT: CPT | Performed by: INTERNAL MEDICINE

## 2021-02-26 PROCEDURE — 85025 COMPLETE CBC W/AUTO DIFF WBC: CPT

## 2021-02-26 PROCEDURE — 80061 LIPID PANEL: CPT

## 2021-02-26 PROCEDURE — 84443 ASSAY THYROID STIM HORMONE: CPT

## 2021-02-26 PROCEDURE — 80053 COMPREHEN METABOLIC PANEL: CPT

## 2021-02-26 PROCEDURE — 36415 COLL VENOUS BLD VENIPUNCTURE: CPT

## 2021-03-31 RX ORDER — PANTOPRAZOLE SODIUM 40 MG/1
40 TABLET, DELAYED RELEASE ORAL
Qty: 90 TABLET | Refills: 0 | Status: SHIPPED | OUTPATIENT
Start: 2021-03-31 | End: 2021-07-13

## 2021-05-07 ENCOUNTER — OFFICE VISIT (OUTPATIENT)
Dept: GASTROENTEROLOGY | Facility: CLINIC | Age: 46
End: 2021-05-07
Payer: COMMERCIAL

## 2021-05-07 VITALS
DIASTOLIC BLOOD PRESSURE: 59 MMHG | SYSTOLIC BLOOD PRESSURE: 108 MMHG | HEIGHT: 63 IN | WEIGHT: 145 LBS | HEART RATE: 78 BPM | BODY MASS INDEX: 25.69 KG/M2

## 2021-05-07 DIAGNOSIS — R14.0 ABDOMINAL BLOATING: ICD-10-CM

## 2021-05-07 DIAGNOSIS — R10.13 DYSPEPSIA: Primary | ICD-10-CM

## 2021-05-07 DIAGNOSIS — R14.2 BURPING: ICD-10-CM

## 2021-05-07 PROCEDURE — 3008F BODY MASS INDEX DOCD: CPT | Performed by: INTERNAL MEDICINE

## 2021-05-07 PROCEDURE — 3078F DIAST BP <80 MM HG: CPT | Performed by: INTERNAL MEDICINE

## 2021-05-07 PROCEDURE — 3074F SYST BP LT 130 MM HG: CPT | Performed by: INTERNAL MEDICINE

## 2021-05-07 PROCEDURE — 99214 OFFICE O/P EST MOD 30 MIN: CPT | Performed by: INTERNAL MEDICINE

## 2021-05-07 NOTE — PROGRESS NOTES
[facility] Follow-up Visit    [place] Surgeon: Nadeem Cooper MD;  Location: [address] [place] [address] ENDOSCOPY   • NEEDLE BIOPSY LEFT      age 25s   • NEEDLE BIOPSY LEFT      between 20-30      Family History   Problem Relation Age of Onset   • Breast Cancer Maternal Grandmother       Social History: Social Hi or edema is evident. Neuro- Alert and oriented x4, and patient is having movement of all 4 extremities     Labs/Imaging:     Patient's labs and imaging were reviewed and discussed with patient today.      .  ASSESSMENT/PLAN:   51 yo F with hx of gastric i

## 2021-05-07 NOTE — PATIENT INSTRUCTIONS
1. Avoid caffeine, chocolate, peppermint, and alcohol. Also avoid lying down flat or in a recumbent position for 3 hours after a meal.   2. Continue pantoprazole consistently  3.  Try diaphragmatic breathing exercises: look up on youtube \"Umich diaphragmat

## 2021-05-10 PROBLEM — R14.2 BURPING: Status: ACTIVE | Noted: 2021-05-10

## 2021-05-10 PROBLEM — R14.0 ABDOMINAL BLOATING: Status: ACTIVE | Noted: 2021-05-10

## 2021-05-10 PROBLEM — R19.8 CHANGE IN BOWEL FUNCTION: Status: RESOLVED | Noted: 2019-07-05 | Resolved: 2021-05-10

## 2021-05-18 ENCOUNTER — HOSPITAL ENCOUNTER (OUTPATIENT)
Dept: MAMMOGRAPHY | Age: 46
Discharge: HOME | End: 2021-05-18
Attending: INTERNAL MEDICINE
Payer: COMMERCIAL

## 2021-05-18 DIAGNOSIS — Z00.00 ANNUAL PHYSICAL EXAM: ICD-10-CM

## 2021-05-18 PROCEDURE — 77063 BREAST TOMOSYNTHESIS BI: CPT | Performed by: INTERNAL MEDICINE

## 2021-05-18 PROCEDURE — 77067 SCR MAMMO BI INCL CAD: CPT | Performed by: INTERNAL MEDICINE

## 2021-05-27 ENCOUNTER — HOSPITAL ENCOUNTER (OUTPATIENT)
Dept: ULTRASOUND IMAGING | Facility: HOSPITAL | Age: 46
Discharge: HOME | End: 2021-05-27
Attending: INTERNAL MEDICINE
Payer: COMMERCIAL

## 2021-05-27 ENCOUNTER — HOSPITAL ENCOUNTER (OUTPATIENT)
Dept: MAMMOGRAPHY | Facility: HOSPITAL | Age: 46
Discharge: HOME | End: 2021-05-27
Attending: INTERNAL MEDICINE
Payer: COMMERCIAL

## 2021-05-27 DIAGNOSIS — R92.8 ABNORMAL MAMMOGRAM: ICD-10-CM

## 2021-05-27 PROCEDURE — 77066 DX MAMMO INCL CAD BI: CPT | Performed by: INTERNAL MEDICINE

## 2021-05-27 PROCEDURE — 76642 ULTRASOUND BREAST LIMITED: CPT | Performed by: INTERNAL MEDICINE

## 2021-05-27 PROCEDURE — 77062 BREAST TOMOSYNTHESIS BI: CPT | Performed by: INTERNAL MEDICINE

## 2021-06-07 ENCOUNTER — OFFICE VISIT (OUTPATIENT)
Dept: DERMATOLOGY CLINIC | Facility: CLINIC | Age: 46
End: 2021-06-07
Payer: COMMERCIAL

## 2021-06-07 DIAGNOSIS — L57.0 ACTINIC KERATOSIS: Primary | ICD-10-CM
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
## 2021-06-07 DIAGNOSIS — L81.4 SOLAR LENTIGO: ICD-10-CM

## 2021-06-07 PROCEDURE — 17000 DESTRUCT PREMALG LESION: CPT | Performed by: DERMATOLOGY

## 2021-06-07 PROCEDURE — 99202 OFFICE O/P NEW SF 15 MIN: CPT | Performed by: DERMATOLOGY

## 2021-06-07 NOTE — PROGRESS NOTES
HPI:     Chief Complaint     Lesion        HPI     Lesion      Additional comments: LOV 5/2018 Patient present with flat flesh color lesion on L lower leg . Patient c/o having lesion for 1 years  and has change size .  Patient denies any hx of sc           L on file    Occupational History      Not on file    Tobacco Use      Smoking status: Never Smoker      Smokeless tobacco: Never Used    Substance and Sexual Activity      Alcohol use: Yes        Comment:  Occ      Drug use: No      Sexual activity: Not on f keratosis  (primary encounter diagnosis)-diagnosis discussed–lesion treated with cryotherapy. Post cryo directions given.   Patient is to let us know in 6 weeks if she does not think it is resolved  Excess sun exposure-proper use and application of broad-s

## 2021-07-13 RX ORDER — PANTOPRAZOLE SODIUM 40 MG/1
40 TABLET, DELAYED RELEASE ORAL
Qty: 90 TABLET | Refills: 1 | Status: SHIPPED | OUTPATIENT
Start: 2021-07-13 | End: 2022-01-23

## 2021-09-05 ENCOUNTER — HOSPITAL ENCOUNTER (OUTPATIENT)
Age: 46
Discharge: HOME | End: 2021-09-05
Attending: PHYSICIAN ASSISTANT
Payer: COMMERCIAL

## 2021-09-05 VITALS
RESPIRATION RATE: 18 BRPM | TEMPERATURE: 99 F | HEIGHT: 66 IN | WEIGHT: 135 LBS | HEART RATE: 94 BPM | SYSTOLIC BLOOD PRESSURE: 121 MMHG | OXYGEN SATURATION: 98 % | BODY MASS INDEX: 21.69 KG/M2 | DIASTOLIC BLOOD PRESSURE: 68 MMHG

## 2021-09-05 DIAGNOSIS — Z20.822 ENCOUNTER FOR LABORATORY TESTING FOR COVID-19 VIRUS: ICD-10-CM

## 2021-09-05 DIAGNOSIS — J02.0 ACUTE STREPTOCOCCAL PHARYNGITIS: Primary | ICD-10-CM

## 2021-09-05 DIAGNOSIS — R05.9 COUGH: ICD-10-CM

## 2021-09-05 LAB
S PYO AG THROAT QL: POSITIVE
SARS-COV-2 RNA RESP QL NAA+PROBE: NOT DETECTED

## 2021-09-05 PROCEDURE — 99213 OFFICE O/P EST LOW 20 MIN: CPT | Performed by: PHYSICIAN ASSISTANT

## 2021-09-05 PROCEDURE — 87880 STREP A ASSAY W/OPTIC: CPT | Performed by: PHYSICIAN ASSISTANT

## 2021-09-05 PROCEDURE — U0002 COVID-19 LAB TEST NON-CDC: HCPCS | Performed by: PHYSICIAN ASSISTANT

## 2021-09-05 RX ORDER — IBUPROFEN 600 MG/1
TABLET ORAL
Qty: 20 TABLET | Refills: 0 | Status: SHIPPED | OUTPATIENT
Start: 2021-09-05 | End: 2021-11-20

## 2021-09-05 RX ORDER — PENICILLIN V POTASSIUM 500 MG/1
500 TABLET ORAL 2 TIMES DAILY
Qty: 20 TABLET | Refills: 0 | Status: SHIPPED | OUTPATIENT
Start: 2021-09-05 | End: 2021-09-15

## 2021-09-05 NOTE — ED PROVIDER NOTES
Patient Seen in: [facility]    History   Patient presents with:  Fatigue  Cough/URI    Stated Complaint: covid test    HPI    42-year-old female presents with chief complaint of sore throat. Onset 3 days ago.   Patient reports associated coug for stated complaint: covid test  Other systems are as noted in HPI. Constitutional and vital signs reviewed. All other systems reviewed and negative except as noted above.     PSFH elements reviewed from today and agreed except as otherwise stated in movement is intact in all 4 extremities. Patient exhibits normal speech. Skin: Skin is normal to inspection. Warm and dry. No obvious bruising. No obvious rash.     DDX: strep vs. Viral pharyngitis vs. uri versus bronchitis versus pneumonia    ED Cours by mouth 2 (two) times daily for 10 days.   Qty: 20 tablet Refills: 0    ibuprofen 600 MG Oral Tab  Take 1 tablet (600 mg total) by mouth every 6 hours with food  Qty: 20 tablet Refills: 0    phenol 1.4 % Mouth/Throat Liquid  Take 5 mL by mouth every 2 (two

## 2021-09-13 ENCOUNTER — TELEPHONE (OUTPATIENT)
Dept: GASTROENTEROLOGY | Facility: CLINIC | Age: 46
End: 2021-09-13

## 2021-09-13 NOTE — TELEPHONE ENCOUNTER
She did not exceed maximum dose so nothing to worry about as  Long as no black stools etc. She should just monitor her symptoms and avoid ibuprofen in general (can cause gastritis/ulcers)    Tylenol max up to 2-3grams a day is safer for her stomach.

## 2021-10-01 ENCOUNTER — NURSE TRIAGE (OUTPATIENT)
Dept: INTERNAL MEDICINE CLINIC | Facility: CLINIC | Age: 46
End: 2021-10-01

## 2021-10-01 NOTE — TELEPHONE ENCOUNTER
Action Requested: Summary for Provider     []  Critical Lab, Recommendations Needed  [] Need Additional Advice  []   FYI    []   Need Orders  [] Need Medications Sent to Pharmacy  [x]  Other     SUMMARY: Verified name and .   Patient states that she has

## 2021-10-02 ENCOUNTER — OFFICE VISIT (OUTPATIENT)
Dept: INTERNAL MEDICINE CLINIC | Facility: CLINIC | Age: 46
End: 2021-10-02
Payer: COMMERCIAL

## 2021-10-02 VITALS
BODY MASS INDEX: 22.28 KG/M2 | DIASTOLIC BLOOD PRESSURE: 54 MMHG | WEIGHT: 138.63 LBS | SYSTOLIC BLOOD PRESSURE: 98 MMHG | HEIGHT: 66 IN | OXYGEN SATURATION: 98 % | HEART RATE: 78 BPM

## 2021-10-02 DIAGNOSIS — M54.50 LOW BACK PAIN RADIATING TO LEFT LEG: Primary | ICD-10-CM

## 2021-10-02 DIAGNOSIS — M79.605 LOW BACK PAIN RADIATING TO LEFT LEG: Primary | ICD-10-CM

## 2021-10-02 PROCEDURE — 81003 URINALYSIS AUTO W/O SCOPE: CPT | Performed by: NURSE PRACTITIONER

## 2021-10-02 PROCEDURE — 3078F DIAST BP <80 MM HG: CPT | Performed by: NURSE PRACTITIONER

## 2021-10-02 PROCEDURE — 3074F SYST BP LT 130 MM HG: CPT | Performed by: NURSE PRACTITIONER

## 2021-10-02 PROCEDURE — 3008F BODY MASS INDEX DOCD: CPT | Performed by: NURSE PRACTITIONER

## 2021-10-02 PROCEDURE — 99213 OFFICE O/P EST LOW 20 MIN: CPT | Performed by: NURSE PRACTITIONER

## 2021-10-02 RX ORDER — TIZANIDINE 4 MG/1
4 TABLET ORAL NIGHTLY
Qty: 5 TABLET | Refills: 0 | Status: SHIPPED | OUTPATIENT
Start: 2021-10-02 | End: 2021-10-07

## 2021-10-02 NOTE — PATIENT INSTRUCTIONS
ASSESSMENT/PLAN:   Low back pain radiating to left leg  (primary encounter diagnosis)  Check urine  Patient request hold on labs. Try Voltaren cream apply 1-2 times daily as needed. Take tizanidine 1 tablet at night before bed for 5 days.   Be careful m

## 2021-10-02 NOTE — PROGRESS NOTES
HPI:    Patient ID: Robbie Amaya is a 48year old female. Patient of Dr. Aleksandra Pete presents seeing for the first time. Exercise level: Not exercising and HAS been following low salt diet. Weight has been stable.     Wt Readings from Last 3 Encounters sedentary lifestyle and lack of exercise. Treatments tried: tylenol , ice pack, over-the-counter pain patch, massaging. Unable to take ibuprofen due to dyspepsia. The treatment provided moderate relief.          Review of Systems   Constitutional: Negative History    Tobacco Use      Smoking status: Never Smoker      Smokeless tobacco: Never Used    Alcohol use: Yes      Comment:  Occ    Drug use: No       PHYSICAL EXAM:   BP 98/54 (BP Location: Left arm, Patient Position: Sitting, Cuff Size: adult)   Pulse 7 or rebound. Musculoskeletal:         General: Normal range of motion. Cervical back: Full passive range of motion without pain, normal range of motion and neck supple.       Lumbar back: Spasms and tenderness (R lateral flexion, forward flexion) pres

## 2021-11-10 ENCOUNTER — TELEPHONE (OUTPATIENT)
Dept: GASTROENTEROLOGY | Facility: CLINIC | Age: 46
End: 2021-11-10

## 2021-11-10 DIAGNOSIS — G51.4 FACIAL TWITCHING: Primary | ICD-10-CM

## 2021-11-10 NOTE — TELEPHONE ENCOUNTER
Dr. Sin,     I contacted patient who states she has been having twitching in face that started about a month ago. In that time has felt twitching in lower left lip, upper lid, both eyes. Patient denies swelling, redness/irrittion, blurred vision.  Inf

## 2021-11-10 NOTE — TELEPHONE ENCOUNTER
Pt states that she has been having twitching in her eyes and would like to know if this could be a side effect of pantoprazole. Please call.

## 2021-11-11 NOTE — TELEPHONE ENCOUNTER
D/w patient--->unlikely from PPI, likely from poor sleep (she has  3year old) and caffeine use causing facial twitching.  However,w ill check electrolytes to make sure they are all normal.    Asked her to stop PPI to see if any improvement off pantoprazole

## 2021-11-20 ENCOUNTER — OFFICE VISIT (OUTPATIENT)
Dept: INTERNAL MEDICINE CLINIC | Facility: CLINIC | Age: 46
End: 2021-11-20
Payer: COMMERCIAL

## 2021-11-20 VITALS
BODY MASS INDEX: 25 KG/M2 | HEART RATE: 79 BPM | DIASTOLIC BLOOD PRESSURE: 70 MMHG | HEIGHT: 63 IN | SYSTOLIC BLOOD PRESSURE: 107 MMHG

## 2021-11-20 DIAGNOSIS — R25.3 TWITCHING: Primary | ICD-10-CM

## 2021-11-20 DIAGNOSIS — L65.9 HAIR THINNING: ICD-10-CM

## 2021-11-20 PROCEDURE — 3078F DIAST BP <80 MM HG: CPT | Performed by: INTERNAL MEDICINE

## 2021-11-20 PROCEDURE — 99214 OFFICE O/P EST MOD 30 MIN: CPT | Performed by: INTERNAL MEDICINE

## 2021-11-20 PROCEDURE — 3074F SYST BP LT 130 MM HG: CPT | Performed by: INTERNAL MEDICINE

## 2021-11-20 PROCEDURE — 3008F BODY MASS INDEX DOCD: CPT | Performed by: INTERNAL MEDICINE

## 2021-11-22 NOTE — PROGRESS NOTES
Patient ID: Yannick Curry is a 48year old female. Patient presents with:  Hair/Scalp Problem: hair thinning   Eye Twitching: right eye twitching x 1 month        HISTORY OF PRESENT ILLNESS:   HPI  Patient presents for above.   Here for multiple issues       Spouse name: Not on file      Number of children: Not on file      Years of education: Not on file      Highest education level: Not on file    Occupational History      Not on file    Tobacco Use      Smoking status: Never Smoker      Smokele Affect: Mood normal.         Behavior: Behavior normal.           ASSESSMENT/PLAN:   1. Twitching  · COMP METABOLIC PANEL (14); Future  · MAGNESIUM; Future  · Suspect that this is due to lack of sleep and overall stress.     2. Hair thinning  · Suspect that

## 2021-12-16 ENCOUNTER — TELEPHONE (OUTPATIENT)
Dept: GASTROENTEROLOGY | Facility: CLINIC | Age: 46
End: 2021-12-16

## 2022-01-11 ENCOUNTER — TELEMEDICINE (OUTPATIENT)
Dept: INTERNAL MEDICINE CLINIC | Facility: CLINIC | Age: 47
End: 2022-01-11

## 2022-01-11 DIAGNOSIS — J02.9 SORE THROAT: Primary | ICD-10-CM

## 2022-01-11 PROCEDURE — 99213 OFFICE O/P EST LOW 20 MIN: CPT | Performed by: INTERNAL MEDICINE

## 2022-01-11 NOTE — PROGRESS NOTES
Patient ID: Lucy Park is a 48year old female. Patient presents with:  Sick Call         HISTORY OF PRESENT ILLNESS:   Patient presents for above. This visit is conducted using Telemedicine with live, interactive video and audio.   Patient with a 2 on file      Highest education level: Not on file    Occupational History      Not on file    Tobacco Use      Smoking status: Never Smoker      Smokeless tobacco: Never Used    Substance and Sexual Activity      Alcohol use: Yes        Comment:  Occ      D visits are ongoing. Every conscious effort was taken to allow for sufficient and adequate time to complete the visit.   The patient was made aware of the limitations of the telehealth visit, including treatment limitations as no physical exam could be perfo

## 2022-01-18 NOTE — TELEPHONE ENCOUNTER
Requested Prescriptions     Pending Prescriptions Disp Refills   • PANTOPRAZOLE 40 MG Oral Tab EC [Pharmacy Med Name: PANTOPRAZOLE SOD DR 40 MG TAB] 90 tablet 1     Sig: TAKE 1 TABLET BY MOUTH BEFORE BREAKFAST   Lr: 07/13/2021          Qty :90 tablet    Re SAMREEN Alexandre MD PGY3

## 2022-01-19 ENCOUNTER — HOSPITAL ENCOUNTER (OUTPATIENT)
Dept: ULTRASOUND IMAGING | Facility: HOSPITAL | Age: 47
Discharge: HOME | End: 2022-01-19
Attending: INTERNAL MEDICINE
Payer: COMMERCIAL

## 2022-01-19 DIAGNOSIS — R92.8 ABNORMAL MAMMOGRAM: ICD-10-CM

## 2022-01-19 PROCEDURE — 76642 ULTRASOUND BREAST LIMITED: CPT | Performed by: INTERNAL MEDICINE

## 2022-01-23 RX ORDER — PANTOPRAZOLE SODIUM 40 MG/1
TABLET, DELAYED RELEASE ORAL
Qty: 90 TABLET | Refills: 1 | Status: SHIPPED | OUTPATIENT
Start: 2022-01-23

## 2022-01-24 DIAGNOSIS — R92.8 ABNORMAL MAMMOGRAM: Primary | ICD-10-CM

## 2022-01-27 ENCOUNTER — IMMUNIZATION (OUTPATIENT)
Dept: LAB | Facility: HOSPITAL | Age: 47
End: 2022-01-27
Attending: EMERGENCY MEDICINE
Payer: COMMERCIAL

## 2022-01-27 DIAGNOSIS — Z23 NEED FOR VACCINATION: Primary | ICD-10-CM

## 2022-01-27 PROCEDURE — 0004A SARSCOV2 VAC 30MCG/0.3ML IM: CPT

## 2022-03-09 ENCOUNTER — LAB ENCOUNTER (OUTPATIENT)
Dept: LAB | Age: 47
End: 2022-03-09
Attending: INTERNAL MEDICINE
Payer: COMMERCIAL

## 2022-03-09 ENCOUNTER — OFFICE VISIT (OUTPATIENT)
Dept: INTERNAL MEDICINE CLINIC | Facility: CLINIC | Age: 47
End: 2022-03-09
Payer: COMMERCIAL

## 2022-03-09 VITALS
WEIGHT: 141 LBS | HEIGHT: 63 IN | HEART RATE: 71 BPM | SYSTOLIC BLOOD PRESSURE: 106 MMHG | DIASTOLIC BLOOD PRESSURE: 72 MMHG | BODY MASS INDEX: 24.98 KG/M2

## 2022-03-09 DIAGNOSIS — Z00.00 ANNUAL PHYSICAL EXAM: ICD-10-CM

## 2022-03-09 DIAGNOSIS — Z12.11 COLON CANCER SCREENING: ICD-10-CM

## 2022-03-09 DIAGNOSIS — R10.13 DYSPEPSIA: ICD-10-CM

## 2022-03-09 DIAGNOSIS — Z00.00 ANNUAL PHYSICAL EXAM: Primary | ICD-10-CM

## 2022-03-09 LAB
ALBUMIN SERPL-MCNC: 3.7 G/DL (ref 3.4–5)
ALBUMIN/GLOB SERPL: 1.4 {RATIO} (ref 1–2)
ALP LIVER SERPL-CCNC: 57 U/L
ALT SERPL-CCNC: 24 U/L
ANION GAP SERPL CALC-SCNC: 4 MMOL/L (ref 0–18)
AST SERPL-CCNC: 16 U/L (ref 15–37)
BASOPHILS NFR BLD AUTO: 0.7 %
BILIRUB SERPL-MCNC: 0.3 MG/DL (ref 0.1–2)
BUN BLD-MCNC: 7 MG/DL (ref 7–18)
BUN/CREAT SERPL: 10.8 (ref 10–20)
CALCIUM BLD-MCNC: 8.8 MG/DL (ref 8.5–10.1)
CHLORIDE SERPL-SCNC: 106 MMOL/L (ref 98–112)
CHOLEST SERPL-MCNC: 167 MG/DL (ref ?–200)
CREAT BLD-MCNC: 0.65 MG/DL
DEPRECATED RDW RBC AUTO: 46.8 FL (ref 35.1–46.3)
EOSINOPHIL # BLD AUTO: 0.13 X10(3) UL (ref 0–0.7)
EOSINOPHIL NFR BLD AUTO: 2.8 %
ERYTHROCYTE [DISTWIDTH] IN BLOOD BY AUTOMATED COUNT: 13.2 % (ref 11–15)
FASTING PATIENT LIPID ANSWER: NO
FASTING STATUS PATIENT QL REPORTED: NO
GLOBULIN PLAS-MCNC: 2.7 G/DL (ref 2.8–4.4)
GLUCOSE BLD-MCNC: 92 MG/DL (ref 70–99)
HCT VFR BLD AUTO: 37.9 %
HDLC SERPL-MCNC: 61 MG/DL (ref 40–59)
HGB BLD-MCNC: 12.5 G/DL
IMM GRANULOCYTES # BLD AUTO: 0.02 X10(3) UL (ref 0–1)
IMM GRANULOCYTES NFR BLD: 0.4 %
LDLC SERPL CALC-MCNC: 83 MG/DL (ref ?–100)
LYMPHOCYTES # BLD AUTO: 1.44 X10(3) UL (ref 1–4)
LYMPHOCYTES NFR BLD AUTO: 31.3 %
MCH RBC QN AUTO: 32.9 PG (ref 26–34)
MCHC RBC AUTO-ENTMCNC: 33 G/DL (ref 31–37)
MCV RBC AUTO: 99.7 FL
MONOCYTES # BLD AUTO: 0.32 X10(3) UL (ref 0.1–1)
MONOCYTES NFR BLD AUTO: 7 %
NEUTROPHILS # BLD AUTO: 2.66 X10 (3) UL (ref 1.5–7.7)
NEUTROPHILS # BLD AUTO: 2.66 X10(3) UL (ref 1.5–7.7)
NEUTROPHILS NFR BLD AUTO: 57.8 %
NONHDLC SERPL-MCNC: 106 MG/DL (ref ?–130)
OSMOLALITY SERPL CALC.SUM OF ELEC: 286 MOSM/KG (ref 275–295)
PLATELET # BLD AUTO: 282 10(3)UL (ref 150–450)
POTASSIUM SERPL-SCNC: 4 MMOL/L (ref 3.5–5.1)
PROT SERPL-MCNC: 6.4 G/DL (ref 6.4–8.2)
RBC # BLD AUTO: 3.8 X10(6)UL
SODIUM SERPL-SCNC: 139 MMOL/L (ref 136–145)
TRIGL SERPL-MCNC: 130 MG/DL (ref 30–149)
TSI SER-ACNC: 0.94 MIU/ML (ref 0.36–3.74)
VLDLC SERPL CALC-MCNC: 21 MG/DL (ref 0–30)
WBC # BLD AUTO: 4.6 X10(3) UL (ref 4–11)

## 2022-03-09 PROCEDURE — 3074F SYST BP LT 130 MM HG: CPT | Performed by: INTERNAL MEDICINE

## 2022-03-09 PROCEDURE — 3008F BODY MASS INDEX DOCD: CPT | Performed by: INTERNAL MEDICINE

## 2022-03-09 PROCEDURE — 3078F DIAST BP <80 MM HG: CPT | Performed by: INTERNAL MEDICINE

## 2022-03-09 PROCEDURE — 85025 COMPLETE CBC W/AUTO DIFF WBC: CPT

## 2022-03-09 PROCEDURE — 99396 PREV VISIT EST AGE 40-64: CPT | Performed by: INTERNAL MEDICINE

## 2022-03-09 PROCEDURE — 84443 ASSAY THYROID STIM HORMONE: CPT

## 2022-03-09 PROCEDURE — 80053 COMPREHEN METABOLIC PANEL: CPT

## 2022-03-09 PROCEDURE — 36415 COLL VENOUS BLD VENIPUNCTURE: CPT

## 2022-03-09 PROCEDURE — 80061 LIPID PANEL: CPT

## 2022-03-09 RX ORDER — PANTOPRAZOLE SODIUM 40 MG/1
40 TABLET, DELAYED RELEASE ORAL
Qty: 90 TABLET | Refills: 3 | Status: SHIPPED | OUTPATIENT
Start: 2022-03-09

## 2022-05-27 ENCOUNTER — VIRTUAL PHONE E/M (OUTPATIENT)
Dept: INTERNAL MEDICINE CLINIC | Facility: CLINIC | Age: 47
End: 2022-05-27
Payer: COMMERCIAL

## 2022-05-27 DIAGNOSIS — J02.9 SORE THROAT: ICD-10-CM

## 2022-05-27 DIAGNOSIS — R09.81 SINUS CONGESTION: ICD-10-CM

## 2022-05-27 DIAGNOSIS — R05.9 COUGH: Primary | ICD-10-CM

## 2022-05-27 PROCEDURE — 99214 OFFICE O/P EST MOD 30 MIN: CPT | Performed by: INTERNAL MEDICINE

## 2022-05-27 RX ORDER — AZITHROMYCIN 250 MG/1
TABLET, FILM COATED ORAL
Qty: 6 TABLET | Refills: 0 | Status: SHIPPED | OUTPATIENT
Start: 2022-05-27 | End: 2022-06-01

## 2022-05-27 RX ORDER — BENZONATATE 100 MG/1
100 CAPSULE ORAL 3 TIMES DAILY PRN
Qty: 20 CAPSULE | Refills: 0 | Status: SHIPPED | OUTPATIENT
Start: 2022-05-27 | End: 2022-06-03

## 2022-06-01 ENCOUNTER — TELEPHONE (OUTPATIENT)
Dept: GASTROENTEROLOGY | Facility: CLINIC | Age: 47
End: 2022-06-01

## 2022-06-01 NOTE — TELEPHONE ENCOUNTER
----- Message from Juancarlos Valverde RN sent at 8/29/2019 12:30 PM CDT -----  Regarding: 3 yr CLN recall  Entered into Epic: Recall CLN per Dr. Luanne Hackett in 3 yrs for gastric intestinal metaplsia. Last done 8/14/2019, next due 8/14/22.  HM updated

## 2022-08-29 ENCOUNTER — TELEPHONE (OUTPATIENT)
Dept: GASTROENTEROLOGY | Facility: CLINIC | Age: 47
End: 2022-08-29

## 2022-08-29 DIAGNOSIS — K31.A0 GASTRIC INTESTINAL METAPLASIA: Primary | ICD-10-CM

## 2022-08-29 RX ORDER — ERGOCALCIFEROL (VITAMIN D2) 10 MCG
400 TABLET ORAL DAILY
COMMUNITY

## 2022-08-29 NOTE — TELEPHONE ENCOUNTER
Patient calling to schedule egd, patient informed of the 5 business day turnaround. Please call at [phone],RAGHAVUB.

## 2022-08-29 NOTE — TELEPHONE ENCOUNTER
Dr. Jose Ibrahim,     Patient calling for a 2-3 year recall for EGD  Patient having symptoms below didn't want to schedule an office visit. Please advise if patient can schedule EGD directly. Last Procedure, Date, MD: EGD & colonoscopy, 8/14/19,   Last Diagnosis: duodenum, stomach, colon biopsy   Recalled for (mth/yrs): 2-3 years  Sedation used previously: MAC   Anticoagulants:no  Diabetic Meds:no   BP meds(Ace inhibitors/ARB's): no  Weight loss meds (phentermine/vyvanse): no  Iron supplement (RX/OTC): no  Height & Weight/BMI: 5'3\"/ 141/ 24.98  Hx of Cardiac/CVA issues/(MI/Stroke): no  Devices Pacemaker/Defibrillator/Stents: no  Resp. Issues/Oxygen Use/RAJENDRA/COPD: no  Issues w/Anesthesia: no    Symptoms (Y/N): yes  Symptoms Details: more bloating than she normally does didn't want to set up an office visit when offered    Special comments/notes: verified pharmacy, medication, and allergies    Please advise on orders and prep, thank you.

## 2022-08-30 NOTE — TELEPHONE ENCOUNTER
GI Staff:     Please schedule: EGD with MAC.      Diagnosis: gastric intestinal metaplasia    Medication adjustments:  Day before procedure, hold: none  Day of procedure, hold: none

## 2022-09-06 NOTE — TELEPHONE ENCOUNTER
Scheduled for:  EGD 11091   Provider Name:  Dr Magdalena Kaiser  Date:  11/02/2022   Location: [address] [place] [address]     Sedation: MAC   Time:  8956 (pt is aware to arrive at 0945 )   Prep:  NPO  Meds/Allergies Reconciled?:  Physician reviewed   Diagnosis with codes: intestinal metaplasia K31. A0   Was patient informed to call insurance with codes (Y/N):  Yes, I confirmed [employer] PPO  insurance with the patient. Referral sent?:  N/A  [facility] or [facility] notified?:  I sent an electronic request to Endo Scheduling and received a confirmation today. Medication Orders: This patient verbally confirmed that she  is not taking:   Iron, blood thinners, BP meds, and is not diabetic   Not latex allergy, Not PCN allergy and does not have a pacemaker     Misc Orders:   Patient is aware she will be scheduled for a covid 19 test prior to procedure       Further instructions given by staff:   This patient had no questions regarding the prep instructions

## 2022-09-14 ENCOUNTER — APPOINTMENT (OUTPATIENT)
Dept: URBAN - METROPOLITAN AREA CLINIC 244 | Age: 47
Setting detail: DERMATOLOGY
End: 2022-09-15

## 2022-09-14 DIAGNOSIS — L81.4 OTHER MELANIN HYPERPIGMENTATION: ICD-10-CM

## 2022-09-14 DIAGNOSIS — D18.0 HEMANGIOMA: ICD-10-CM

## 2022-09-14 DIAGNOSIS — L57.0 ACTINIC KERATOSIS: ICD-10-CM

## 2022-09-14 PROBLEM — D48.5 NEOPLASM OF UNCERTAIN BEHAVIOR OF SKIN: Status: ACTIVE | Noted: 2022-09-14

## 2022-09-14 PROBLEM — D18.01 HEMANGIOMA OF SKIN AND SUBCUTANEOUS TISSUE: Status: ACTIVE | Noted: 2022-09-14

## 2022-09-14 PROCEDURE — OTHER COUNSELING: OTHER

## 2022-09-14 PROCEDURE — OTHER ADDITIONAL NOTES: OTHER

## 2022-09-14 PROCEDURE — 99203 OFFICE O/P NEW LOW 30 MIN: CPT

## 2022-09-14 ASSESSMENT — LOCATION ZONE DERM
LOCATION ZONE: TRUNK
LOCATION ZONE: HAND
LOCATION ZONE: FACE

## 2022-09-14 ASSESSMENT — LOCATION DETAILED DESCRIPTION DERM
LOCATION DETAILED: RIGHT RADIAL DORSAL HAND
LOCATION DETAILED: LEFT ULNAR DORSAL HAND
LOCATION DETAILED: RIGHT LATERAL ABDOMEN
LOCATION DETAILED: LEFT CENTRAL MALAR CHEEK

## 2022-09-14 ASSESSMENT — LOCATION SIMPLE DESCRIPTION DERM
LOCATION SIMPLE: RIGHT HAND
LOCATION SIMPLE: LEFT HAND
LOCATION SIMPLE: LEFT CHEEK
LOCATION SIMPLE: ABDOMEN

## 2022-09-14 NOTE — PROCEDURE: ADDITIONAL NOTES
Detail Level: Simple
Additional Notes: Pt opts to monitor, advised to RTC sooner if changes in lesion are noted or in 3 months
Render Risk Assessment In Note?: no

## 2022-09-15 ENCOUNTER — HOSPITAL ENCOUNTER (OUTPATIENT)
Dept: MAMMOGRAPHY | Facility: HOSPITAL | Age: 47
Discharge: HOME | End: 2022-09-15
Attending: PHYSICIAN ASSISTANT
Payer: COMMERCIAL

## 2022-09-15 ENCOUNTER — HOSPITAL ENCOUNTER (OUTPATIENT)
Dept: ULTRASOUND IMAGING | Facility: HOSPITAL | Age: 47
Discharge: HOME | End: 2022-09-15
Attending: PHYSICIAN ASSISTANT
Payer: COMMERCIAL

## 2022-09-15 DIAGNOSIS — R92.8 ABNORMAL MAMMOGRAM: ICD-10-CM

## 2022-09-15 PROCEDURE — 77062 BREAST TOMOSYNTHESIS BI: CPT | Performed by: PHYSICIAN ASSISTANT

## 2022-09-15 PROCEDURE — 77066 DX MAMMO INCL CAD BI: CPT | Performed by: PHYSICIAN ASSISTANT

## 2022-09-15 PROCEDURE — 76642 ULTRASOUND BREAST LIMITED: CPT | Performed by: PHYSICIAN ASSISTANT

## 2022-10-31 ENCOUNTER — TELEPHONE (OUTPATIENT)
Dept: GASTROENTEROLOGY | Facility: CLINIC | Age: 47
End: 2022-10-31

## 2022-10-31 ENCOUNTER — LAB ENCOUNTER (OUTPATIENT)
Dept: LAB | Facility: HOSPITAL | Age: 47
End: 2022-10-31
Attending: INTERNAL MEDICINE
Payer: COMMERCIAL

## 2022-10-31 DIAGNOSIS — Z01.818 PRE-OP TESTING: ICD-10-CM

## 2022-10-31 LAB — SARS-COV-2 RNA RESP QL NAA+PROBE: NOT DETECTED

## 2022-10-31 NOTE — TELEPHONE ENCOUNTER
Pt states she missed taking the covid-19 test yesterday and wondering if she can still have the procedure on 11/2 please call

## 2022-11-02 ENCOUNTER — ANESTHESIA (OUTPATIENT)
Dept: ENDOSCOPY | Age: 47
End: 2022-11-02
Payer: COMMERCIAL

## 2022-11-02 ENCOUNTER — HOSPITAL ENCOUNTER (OUTPATIENT)
Age: 47
Setting detail: HOSPITAL OUTPATIENT SURGERY
Discharge: HOME | End: 2022-11-02
Attending: INTERNAL MEDICINE | Admitting: INTERNAL MEDICINE
Payer: COMMERCIAL

## 2022-11-02 ENCOUNTER — ANESTHESIA EVENT (OUTPATIENT)
Dept: ENDOSCOPY | Age: 47
End: 2022-11-02
Payer: COMMERCIAL

## 2022-11-02 VITALS
WEIGHT: 141 LBS | SYSTOLIC BLOOD PRESSURE: 116 MMHG | HEART RATE: 68 BPM | RESPIRATION RATE: 13 BRPM | HEIGHT: 63 IN | TEMPERATURE: 96 F | DIASTOLIC BLOOD PRESSURE: 68 MMHG | BODY MASS INDEX: 24.98 KG/M2 | OXYGEN SATURATION: 99 %

## 2022-11-02 DIAGNOSIS — K31.A0 GASTRIC INTESTINAL METAPLASIA: ICD-10-CM

## 2022-11-02 DIAGNOSIS — Z01.818 PRE-OP TESTING: Primary | ICD-10-CM

## 2022-11-02 LAB — B-HCG UR QL: NEGATIVE

## 2022-11-02 PROCEDURE — 99070 SPECIAL SUPPLIES PHYS/QHP: CPT | Performed by: INTERNAL MEDICINE

## 2022-11-02 PROCEDURE — 43239 EGD BIOPSY SINGLE/MULTIPLE: CPT | Performed by: INTERNAL MEDICINE

## 2022-11-02 RX ORDER — SODIUM CHLORIDE, SODIUM LACTATE, POTASSIUM CHLORIDE, CALCIUM CHLORIDE 600; 310; 30; 20 MG/100ML; MG/100ML; MG/100ML; MG/100ML
INJECTION, SOLUTION INTRAVENOUS CONTINUOUS
OUTPATIENT
Start: 2022-11-02

## 2022-11-02 RX ORDER — LIDOCAINE HYDROCHLORIDE 10 MG/ML
INJECTION, SOLUTION EPIDURAL; INFILTRATION; INTRACAUDAL; PERINEURAL AS NEEDED
Status: DISCONTINUED | OUTPATIENT
Start: 2022-11-02 | End: 2022-11-02 | Stop reason: SURG

## 2022-11-02 RX ORDER — NALOXONE HYDROCHLORIDE 0.4 MG/ML
80 INJECTION, SOLUTION INTRAMUSCULAR; INTRAVENOUS; SUBCUTANEOUS AS NEEDED
OUTPATIENT
Start: 2022-11-02 | End: 2022-11-02

## 2022-11-02 RX ORDER — SODIUM CHLORIDE, SODIUM LACTATE, POTASSIUM CHLORIDE, CALCIUM CHLORIDE 600; 310; 30; 20 MG/100ML; MG/100ML; MG/100ML; MG/100ML
INJECTION, SOLUTION INTRAVENOUS CONTINUOUS
Status: DISCONTINUED | OUTPATIENT
Start: 2022-11-02 | End: 2022-11-02

## 2022-11-02 RX ADMIN — SODIUM CHLORIDE, SODIUM LACTATE, POTASSIUM CHLORIDE, CALCIUM CHLORIDE: 600; 310; 30; 20 INJECTION, SOLUTION INTRAVENOUS at 10:14:00

## 2022-11-02 RX ADMIN — SODIUM CHLORIDE, SODIUM LACTATE, POTASSIUM CHLORIDE, CALCIUM CHLORIDE: 600; 310; 30; 20 INJECTION, SOLUTION INTRAVENOUS at 10:25:00

## 2022-11-02 RX ADMIN — LIDOCAINE HYDROCHLORIDE 50 MG: 10 INJECTION, SOLUTION EPIDURAL; INFILTRATION; INTRACAUDAL; PERINEURAL at 10:16:00

## 2022-11-02 NOTE — OPERATIVE REPORT
ESOPHAGOGASTRODUODENOSCOPY (EGD) REPORT    Radha Rodriguez     1975 Age 50year old   PCP Ludwig Beckford MD Endoscopist Margy Ross MD     Date of procedure: 22    Procedure: EGD w/cold biopsy    Pre-operative diagnosis: Gastric intestinal metaplasia    Post-operative diagnosis: Gastric erythema    Medications: MAC    Complications: none    Procedure:  Informed consent was obtained from the patient after the risks of the procedure were discussed, including but not limited to bleeding, perforation, aspiration, infection, or possibility of a missed lesion. After discussions of the risks/benefits and alternatives to this procedure, as well as the planned sedation, the patient was placed in the left lateral decubitus position and begun on continuous blood pressure pulse oximetry and EKG monitoring and this was maintained throughout the procedure. Once an adequate level of sedation was obtained a bite block was placed. Then the lubricated tip of the [id number] diagnostic video upper endoscope was inserted and advanced using direct visualization into the posterior pharynx and ultimately into the esophagus. Complications: None    Findings:      1. Esophagus: The squamocolumnar junction was noted at 40 cm and appeared regular. The diaphragmatic pinch was noted noted at 40 cm from the incisors. The esophageal mucosa appeared normal. There was no endoscopic findings of esophagitis, stricture or Black's esophagus. 2. Stomach: The stomach distended normally. Normal rugal folds were seen. The pylorus was patent. The gastric mucosa appeared mildly erythematous s/p mapping gastric biopsies. Retroflexion revealed a normal fundus and a non-patulous cardia. 3. Duodenum: The duodenal mucosa appeared normal in the 1st and 2nd portion of the duodenum. Impression:  1. Mild gastric erythema   2. Hx of gastric IM, mapping biopsies obtained. Recommend:  1. Await pathology.   2. Avoid smoking, alcohol, high salt diet. 3. Likely repeat EGD in 3 years. At that time, can consider CLN as well. Last CLN in 2019 showed no polyps.     >>>If tissue was sampled/removed and you have not received your pathology results either by phone or letter within 2 weeks, please call our office at [phone].     Specimens:  gastric    Blood loss: <1 ml

## 2022-11-02 NOTE — ANESTHESIA POSTPROCEDURE EVALUATION
Patient: Zee Ortega    Procedure Summary     Date: 11/02/22 Room / Location: [facility] ENDOSCOPY 02 / [address] [facility] 1 ENDO    Anesthesia Start: 0410 Anesthesia Stop: 2352    Procedure: ESOPHAGOGASTRODUODENOSCOPY WITH POSSIBLE BIOPSY Diagnosis:       Gastric intestinal metaplasia      (Gastric erythema)    Surgeons: Leonela Chawla MD Anesthesiologist:     Anesthesia Type: MAC ASA Status: 2          Anesthesia Type: MAC    Vitals Value Taken Time   /57 11/02/22 1029   Temp  11/02/22 1030   Pulse 65 11/02/22 1030   Resp 17 11/02/22 1030   SpO2 99 % 11/02/22 1030   Vitals shown include unvalidated device data.     [facility] Post Evaluation:   Patient Evaluated in PACU  Patient Participation: complete - patient participated  Level of Consciousness: awake  Pain Score: 0  Pain Management: adequate  Airway Patency:patent  Dental exam unchanged from preop  Yes    Cardiovascular Status: acceptable  Respiratory Status: acceptable  Postoperative Hydration acceptable      Tapan Schofield MD  11/2/2022 10:30 AM

## 2022-11-02 NOTE — DISCHARGE INSTRUCTIONS
Home Care Instructions for Gastroscopy with Sedation    Diet:  - Resume your regular diet as tolerated unless otherwise instructed. - Start with light meals to minimize bloating.  - Do not drink alcohol today. Medication:  - If you have questions about resuming your normal medications, please contact your Primary Care Physician. Activities:  - Take it easy today. Do not return to work today. - Do not drive today. - Do not operate any machinery today (including kitchen equipment). Gastroscopy:  - You may have a sore throat for 2-3 days following the exam. This is normal. Gargling with warm salt water (1/2 tsp salt to 1 glass warm water) or using throat lozenges will help. - If you experience any sharp pain in your neck, abdomen or chest, vomiting of blood, oral temperature over 100 degrees Fahrenheit, light-headedness or dizziness, or any other problems, contact your doctor. **If unable to reach your doctor, please go to the [facility] Emergency Room**    - Your referring physician will receive a full report of your examination.  - If you do not hear from your doctor's office within two weeks of your biopsy, please call them for your results. You may be able to see your laboratory results in [facility] between 4 and 7 business days. In some cases, your physician may not have viewed the results before they are released to [address] If you have questions regarding your results contact the physician who ordered the test/exam by phone or via [address] by choosing \"Ask a Medical Question. \"

## 2022-11-15 ENCOUNTER — TELEPHONE (OUTPATIENT)
Facility: CLINIC | Age: 47
End: 2022-11-15

## 2022-11-15 NOTE — TELEPHONE ENCOUNTER
Recall EGD in 3 years 11/2/2025 per Araceli Duckworth.  EGD done 11/2/2022  Miguel A Orozco MD  P Em Gi Clinical Staff  GI Staff:   Repeat EGD In 3 years

## 2023-04-27 ENCOUNTER — APPOINTMENT (OUTPATIENT)
Dept: URBAN - METROPOLITAN AREA CLINIC 244 | Age: 48
Setting detail: DERMATOLOGY
End: 2023-04-28

## 2023-04-27 DIAGNOSIS — L57.0 ACTINIC KERATOSIS: ICD-10-CM

## 2023-04-27 DIAGNOSIS — L30.9 DERMATITIS, UNSPECIFIED: ICD-10-CM

## 2023-04-27 DIAGNOSIS — L81.4 OTHER MELANIN HYPERPIGMENTATION: ICD-10-CM

## 2023-04-27 DIAGNOSIS — L82.1 OTHER SEBORRHEIC KERATOSIS: ICD-10-CM

## 2023-04-27 DIAGNOSIS — D22 MELANOCYTIC NEVI: ICD-10-CM

## 2023-04-27 DIAGNOSIS — L23.9 ALLERGIC CONTACT DERMATITIS, UNSPECIFIED CAUSE: ICD-10-CM

## 2023-04-27 PROBLEM — D48.5 NEOPLASM OF UNCERTAIN BEHAVIOR OF SKIN: Status: ACTIVE | Noted: 2023-04-27

## 2023-04-27 PROBLEM — D22.39 MELANOCYTIC NEVI OF OTHER PARTS OF FACE: Status: ACTIVE | Noted: 2023-04-27

## 2023-04-27 PROCEDURE — OTHER PRESCRIPTION: OTHER

## 2023-04-27 PROCEDURE — 99213 OFFICE O/P EST LOW 20 MIN: CPT

## 2023-04-27 PROCEDURE — OTHER COUNSELING: OTHER

## 2023-04-27 RX ORDER — TACROLIMUS 1 MG/G
OINTMENT TOPICAL BID
Qty: 30 | Refills: 1 | Status: ERX | COMMUNITY
Start: 2023-04-27

## 2023-04-27 RX ORDER — HYDROCORTISONE 25 MG/G
CREAM TOPICAL
Qty: 28 | Refills: 0 | Status: ERX | COMMUNITY
Start: 2023-04-27

## 2023-04-27 ASSESSMENT — LOCATION DETAILED DESCRIPTION DERM
LOCATION DETAILED: RIGHT LATERAL SUPERIOR EYELID
LOCATION DETAILED: LEFT RADIAL DORSAL HAND
LOCATION DETAILED: RIGHT RADIAL DORSAL HAND
LOCATION DETAILED: RIGHT INFERIOR MEDIAL MALAR CHEEK
LOCATION DETAILED: LEFT CENTRAL MALAR CHEEK
LOCATION DETAILED: RIGHT INFERIOR MEDIAL FOREHEAD
LOCATION DETAILED: LEFT ULNAR DORSAL HAND
LOCATION DETAILED: UPPER STERNUM

## 2023-04-27 ASSESSMENT — LOCATION ZONE DERM
LOCATION ZONE: HAND
LOCATION ZONE: FACE
LOCATION ZONE: TRUNK
LOCATION ZONE: EYELID

## 2023-04-27 ASSESSMENT — LOCATION SIMPLE DESCRIPTION DERM
LOCATION SIMPLE: LEFT CHEEK
LOCATION SIMPLE: RIGHT SUPERIOR EYELID
LOCATION SIMPLE: CHEST
LOCATION SIMPLE: RIGHT HAND
LOCATION SIMPLE: LEFT HAND
LOCATION SIMPLE: RIGHT FOREHEAD
LOCATION SIMPLE: RIGHT CHEEK

## 2023-06-19 DIAGNOSIS — R10.13 DYSPEPSIA: ICD-10-CM

## 2023-06-19 RX ORDER — PANTOPRAZOLE SODIUM 40 MG/1
40 TABLET, DELAYED RELEASE ORAL
Qty: 90 TABLET | Refills: 3 | Status: SHIPPED | OUTPATIENT
Start: 2023-06-19

## 2023-06-19 NOTE — TELEPHONE ENCOUNTER
Please review; protocol failed.     Last office visit 3/9/22      Requested Prescriptions   Pending Prescriptions Disp Refills    PANTOPRAZOLE 40 MG Oral Tab EC [Pharmacy Med Name: PANTOPRAZOLE SOD DR 40 MG TAB] 90 tablet 3     Sig: TAKE 1 TABLET BY MOUTH BEFORE BREAKFAST       Gastrointestional Medication Protocol Failed - 6/19/2023 12:06 AM        Failed - In person appointment or virtual visit in the past 12 mos or appointment in next 3 mos     Recent Outpatient Visits              1 year ago Cough    Debbie Garrison MD    [facility]    1 year ago Annual physical exam    Gabby Hull, Yoshi Abbott MD    Office Visit    1 year ago Sore throat    Vickie Ramos MD    Telemedicine    1 year ago Vickie Mar MD    Office Visit    1 year ago Low back pain radiating to left leg    [facility], [address] [facility], [facility], APRN    Office Visit                           Recent Outpatient Visits              1 year ago Cough    Higinio Lopez, Torrey Tamayo MD    [facility]    1 year ago Annual physical exam    Vickie Ramos MD    Office Visit    1 year ago Sore throat    Gabby Hull, Vickie Hollis MD    Telemedicine    1 year ago Vickie Mar MD    Office Visit    1 year ago Low back pain radiating to left leg    Higinio Lopez, [facility], APRN    Office Visit

## 2023-08-25 ENCOUNTER — OFFICE VISIT (OUTPATIENT)
Dept: INTERNAL MEDICINE CLINIC | Facility: CLINIC | Age: 48
End: 2023-08-25

## 2023-08-25 ENCOUNTER — LAB ENCOUNTER (OUTPATIENT)
Dept: LAB | Age: 48
End: 2023-08-25
Attending: INTERNAL MEDICINE
Payer: COMMERCIAL

## 2023-08-25 VITALS
HEART RATE: 77 BPM | DIASTOLIC BLOOD PRESSURE: 62 MMHG | RESPIRATION RATE: 14 BRPM | HEIGHT: 63 IN | SYSTOLIC BLOOD PRESSURE: 112 MMHG | BODY MASS INDEX: 25.09 KG/M2 | WEIGHT: 141.63 LBS | OXYGEN SATURATION: 97 %

## 2023-08-25 DIAGNOSIS — K31.A0 INTESTINAL METAPLASIA OF GASTRIC MUCOSA: ICD-10-CM

## 2023-08-25 DIAGNOSIS — Z12.11 COLON CANCER SCREENING: ICD-10-CM

## 2023-08-25 DIAGNOSIS — Z00.00 ANNUAL PHYSICAL EXAM: Primary | ICD-10-CM

## 2023-08-25 DIAGNOSIS — R14.0 ABDOMINAL BLOATING: ICD-10-CM

## 2023-08-25 DIAGNOSIS — Z23 NEED FOR VACCINATION: ICD-10-CM

## 2023-08-25 DIAGNOSIS — J35.8 TONSIL STONE: ICD-10-CM

## 2023-08-25 DIAGNOSIS — Z00.00 ANNUAL PHYSICAL EXAM: ICD-10-CM

## 2023-08-25 DIAGNOSIS — R92.8 ABNORMAL MAMMOGRAM: ICD-10-CM

## 2023-08-25 PROBLEM — R10.13 DYSPEPSIA: Status: RESOLVED | Noted: 2019-07-05 | Resolved: 2023-08-25

## 2023-08-25 PROBLEM — R14.2 BURPING: Status: RESOLVED | Noted: 2021-05-10 | Resolved: 2023-08-25

## 2023-08-25 LAB
ALBUMIN SERPL-MCNC: 4 G/DL (ref 3.4–5)
ALBUMIN/GLOB SERPL: 1.3 {RATIO} (ref 1–2)
ALP LIVER SERPL-CCNC: 58 U/L
ALT SERPL-CCNC: 19 U/L
ANION GAP SERPL CALC-SCNC: 8 MMOL/L (ref 0–18)
AST SERPL-CCNC: 13 U/L (ref 15–37)
BASOPHILS # BLD AUTO: 0.03 X10(3) UL (ref 0–0.2)
BASOPHILS NFR BLD AUTO: 0.5 %
BILIRUB SERPL-MCNC: 0.3 MG/DL (ref 0.1–2)
BUN BLD-MCNC: 12 MG/DL (ref 7–18)
BUN/CREAT SERPL: 17.1 (ref 10–20)
CALCIUM BLD-MCNC: 9.2 MG/DL (ref 8.5–10.1)
CHLORIDE SERPL-SCNC: 104 MMOL/L (ref 98–112)
CHOLEST SERPL-MCNC: 157 MG/DL (ref ?–200)
CO2 SERPL-SCNC: 29 MMOL/L (ref 21–32)
CREAT BLD-MCNC: 0.7 MG/DL
DEPRECATED RDW RBC AUTO: 44 FL (ref 35.1–46.3)
EGFRCR SERPLBLD CKD-EPI 2021: 105 ML/MIN/1.73M2 (ref 60–?)
EOSINOPHIL # BLD AUTO: 0.1 X10(3) UL (ref 0–0.7)
EOSINOPHIL NFR BLD AUTO: 1.8 %
ERYTHROCYTE [DISTWIDTH] IN BLOOD BY AUTOMATED COUNT: 12.4 % (ref 11–15)
FASTING PATIENT LIPID ANSWER: NO
FASTING STATUS PATIENT QL REPORTED: NO
GLOBULIN PLAS-MCNC: 3 G/DL (ref 2.8–4.4)
GLUCOSE BLD-MCNC: 100 MG/DL (ref 70–99)
HCT VFR BLD AUTO: 39.8 %
HDLC SERPL-MCNC: 58 MG/DL (ref 40–59)
HGB BLD-MCNC: 13.3 G/DL
IMM GRANULOCYTES # BLD AUTO: 0.03 X10(3) UL (ref 0–1)
IMM GRANULOCYTES NFR BLD: 0.5 %
LDLC SERPL CALC-MCNC: 80 MG/DL (ref ?–100)
LYMPHOCYTES # BLD AUTO: 1.27 X10(3) UL (ref 1–4)
LYMPHOCYTES NFR BLD AUTO: 22.2 %
MCH RBC QN AUTO: 32.4 PG (ref 26–34)
MCHC RBC AUTO-ENTMCNC: 33.4 G/DL (ref 31–37)
MCV RBC AUTO: 96.8 FL
MONOCYTES # BLD AUTO: 0.4 X10(3) UL (ref 0.1–1)
MONOCYTES NFR BLD AUTO: 7 %
NEUTROPHILS # BLD AUTO: 3.88 X10 (3) UL (ref 1.5–7.7)
NEUTROPHILS # BLD AUTO: 3.88 X10(3) UL (ref 1.5–7.7)
NEUTROPHILS NFR BLD AUTO: 68 %
NONHDLC SERPL-MCNC: 99 MG/DL (ref ?–130)
OSMOLALITY SERPL CALC.SUM OF ELEC: 292 MOSM/KG (ref 275–295)
PLATELET # BLD AUTO: 289 10(3)UL (ref 150–450)
POTASSIUM SERPL-SCNC: 3.5 MMOL/L (ref 3.5–5.1)
PROT SERPL-MCNC: 7 G/DL (ref 6.4–8.2)
RBC # BLD AUTO: 4.11 X10(6)UL
SODIUM SERPL-SCNC: 141 MMOL/L (ref 136–145)
TRIGL SERPL-MCNC: 104 MG/DL (ref 30–149)
TSI SER-ACNC: 1.17 MIU/ML (ref 0.36–3.74)
VLDLC SERPL CALC-MCNC: 16 MG/DL (ref 0–30)
WBC # BLD AUTO: 5.7 X10(3) UL (ref 4–11)

## 2023-08-25 PROCEDURE — 3008F BODY MASS INDEX DOCD: CPT | Performed by: INTERNAL MEDICINE

## 2023-08-25 PROCEDURE — 85025 COMPLETE CBC W/AUTO DIFF WBC: CPT

## 2023-08-25 PROCEDURE — 80053 COMPREHEN METABOLIC PANEL: CPT

## 2023-08-25 PROCEDURE — 90750 HZV VACC RECOMBINANT IM: CPT | Performed by: INTERNAL MEDICINE

## 2023-08-25 PROCEDURE — 90471 IMMUNIZATION ADMIN: CPT | Performed by: INTERNAL MEDICINE

## 2023-08-25 PROCEDURE — 99396 PREV VISIT EST AGE 40-64: CPT | Performed by: INTERNAL MEDICINE

## 2023-08-25 PROCEDURE — 36415 COLL VENOUS BLD VENIPUNCTURE: CPT

## 2023-08-25 PROCEDURE — 3074F SYST BP LT 130 MM HG: CPT | Performed by: INTERNAL MEDICINE

## 2023-08-25 PROCEDURE — 80061 LIPID PANEL: CPT

## 2023-08-25 PROCEDURE — 84443 ASSAY THYROID STIM HORMONE: CPT

## 2023-08-25 PROCEDURE — 3078F DIAST BP <80 MM HG: CPT | Performed by: INTERNAL MEDICINE

## 2023-08-25 NOTE — NST
Nonstress Test   Patient: Sharita Curiel    Gestation: 39w0d    NST: AMA, IVF       Variability: Moderate           Accelerations: Yes           Decelerations: None            Baseline: 125 BPM           Uterine Irritability: No           Contractions: Not impaired balance/pain/decreased ROM/decreased strength

## 2023-09-14 ENCOUNTER — HOSPITAL ENCOUNTER (OUTPATIENT)
Dept: ULTRASOUND IMAGING | Facility: HOSPITAL | Age: 48
Discharge: HOME | End: 2023-09-14
Attending: PHYSICIAN ASSISTANT
Payer: COMMERCIAL

## 2023-09-14 DIAGNOSIS — R92.8 ABNORMAL MAMMOGRAM: ICD-10-CM

## 2023-11-14 ENCOUNTER — TELEPHONE (OUTPATIENT)
Dept: INTERNAL MEDICINE CLINIC | Facility: CLINIC | Age: 48
End: 2023-11-14

## 2023-11-14 DIAGNOSIS — R92.8 FOLLOW-UP EXAMINATION OF ABNORMAL MAMMOGRAM: ICD-10-CM

## 2023-11-14 DIAGNOSIS — R92.8 ABNORMAL MAMMOGRAM: Primary | ICD-10-CM

## 2023-11-14 NOTE — TELEPHONE ENCOUNTER
Patient is requesting an order for a mammogram. She states she was supposed to complete an ultrasound and they told her she didn't schedule it in time and she is needing to complete a mammogram first.

## 2023-12-08 ENCOUNTER — NURSE ONLY (OUTPATIENT)
Dept: INTERNAL MEDICINE CLINIC | Facility: CLINIC | Age: 48
End: 2023-12-08
Payer: COMMERCIAL

## 2023-12-08 DIAGNOSIS — Z23 NEED FOR ZOSTER VACCINE: Primary | ICD-10-CM

## 2023-12-08 PROCEDURE — 90750 HZV VACC RECOMBINANT IM: CPT | Performed by: INTERNAL MEDICINE

## 2023-12-08 PROCEDURE — 90471 IMMUNIZATION ADMIN: CPT | Performed by: INTERNAL MEDICINE

## 2023-12-15 ENCOUNTER — OFFICE VISIT (OUTPATIENT)
Dept: OBGYN CLINIC | Facility: CLINIC | Age: 48
End: 2023-12-15

## 2023-12-15 VITALS
BODY MASS INDEX: 25.37 KG/M2 | WEIGHT: 143.19 LBS | SYSTOLIC BLOOD PRESSURE: 116 MMHG | HEART RATE: 84 BPM | DIASTOLIC BLOOD PRESSURE: 77 MMHG | HEIGHT: 63 IN

## 2023-12-15 DIAGNOSIS — Z12.4 SCREENING FOR MALIGNANT NEOPLASM OF CERVIX: ICD-10-CM

## 2023-12-15 DIAGNOSIS — Z12.4 SCREENING FOR CERVICAL CANCER: ICD-10-CM

## 2023-12-15 DIAGNOSIS — Z80.3 FAMILY HISTORY OF BREAST CANCER IN FEMALE: ICD-10-CM

## 2023-12-15 DIAGNOSIS — N92.0 MENORRHAGIA WITH REGULAR CYCLE: ICD-10-CM

## 2023-12-15 DIAGNOSIS — Z01.411 ENCOUNTER FOR GYNECOLOGICAL EXAMINATION WITH ABNORMAL FINDING: Primary | ICD-10-CM

## 2023-12-15 PROCEDURE — 99386 PREV VISIT NEW AGE 40-64: CPT | Performed by: OBSTETRICS & GYNECOLOGY

## 2023-12-15 PROCEDURE — 3074F SYST BP LT 130 MM HG: CPT | Performed by: OBSTETRICS & GYNECOLOGY

## 2023-12-15 PROCEDURE — 3078F DIAST BP <80 MM HG: CPT | Performed by: OBSTETRICS & GYNECOLOGY

## 2023-12-15 PROCEDURE — 3008F BODY MASS INDEX DOCD: CPT | Performed by: OBSTETRICS & GYNECOLOGY

## 2023-12-18 LAB — HPV I/H RISK 1 DNA SPEC QL NAA+PROBE: NEGATIVE

## 2023-12-20 PROBLEM — Z80.3 FAMILY HISTORY OF BREAST CANCER IN FEMALE: Status: ACTIVE | Noted: 2023-12-20

## 2023-12-20 NOTE — PROGRESS NOTES
Subjective:   Patient ID: Jodi Griffiths is a 46year old female. HPI  G6 [id number] with menses q 28d / 3-4d / heavy on day 1-2. Rhythm for [facility]. New family issue with her sister having a CVA at age 45. It is unclear if this was hemorrhagic or not. Boys are well. History/Other:   Review of Systems   Constitutional:  Negative for appetite change, fatigue and unexpected weight change. Eyes:  Negative for visual disturbance. Respiratory:  Negative for cough and shortness of breath. Cardiovascular:  Negative for chest pain, palpitations and leg swelling. Gastrointestinal:  Negative for abdominal distention, abdominal pain, blood in stool, constipation and diarrhea. Genitourinary:  Positive for urgency (chronic). Negative for dyspareunia, dysuria, frequency, menstrual problem and pelvic pain. Musculoskeletal:  Negative for arthralgias and myalgias. Skin:  Negative for rash. Neurological:  Negative for weakness, numbness and headaches. Psychiatric/Behavioral:  Negative for dysphoric mood. The patient is not nervous/anxious. Current Outpatient Medications   Medication Sig Dispense Refill    pantoprazole 40 MG Oral Tab EC Take 1 tablet (40 mg total) by mouth before breakfast. 90 tablet 3     Allergies: Allergies   Allergen Reactions    Demerol Hcl [Meperidine] RASH       Objective:   Physical Exam  Constitutional:       General: She is not in acute distress. Appearance: She is well-developed. She is not diaphoretic. Neck:      Thyroid: No thyromegaly. Cardiovascular:      Rate and Rhythm: Normal rate and regular rhythm. Heart sounds: Normal heart sounds. No murmur heard. Pulmonary:      Effort: Pulmonary effort is normal.      Breath sounds: Normal breath sounds. No wheezing or rales. Chest:   Breasts:     Right: Normal. No mass, nipple discharge, skin change or tenderness. Left: Normal. No mass, nipple discharge, skin change or tenderness.       Comments:     Abdominal: General: There is no distension. Palpations: Abdomen is soft. There is no mass. Tenderness: There is no abdominal tenderness. There is no guarding or rebound. Genitourinary:     Labia:         Right: No rash or lesion. Left: No rash or lesion. Vagina: Normal. No vaginal discharge. Cervix: No cervical motion tenderness or discharge. Uterus: Not enlarged and not tender. Adnexa:         Right: No mass, tenderness or fullness. Left: No mass, tenderness or fullness. Musculoskeletal:         General: No tenderness. Cervical back: Neck supple. Lymphadenopathy:      Cervical: No cervical adenopathy. Upper Body:      Right upper body: No supraclavicular, axillary or pectoral adenopathy. Left upper body: No supraclavicular, axillary or pectoral adenopathy. Neurological:      Mental Status: She is alert. Assessment & Plan:   1. Encounter for gynecological examination with abnormal finding    2. Menorrhagia with regular cycle    3. Screening for malignant neoplasm of cervix    4. Screening for cervical cancer    5. Family history of breast cancer in female        Orders Placed This Encounter   Procedures    Hpv Dna  High Risk , Thin Prep Collect    ThinPrep PAP Smear    THINPREP PAP SMEAR ONLY     Has mammo scheduled from Dr Robert Mckinley order.    Meds This Visit:  Requested Prescriptions      No prescriptions requested or ordered in this encounter       Imaging & Referrals:  None

## 2023-12-22 ENCOUNTER — OFFICE VISIT (OUTPATIENT)
Dept: INTERNAL MEDICINE CLINIC | Facility: CLINIC | Age: 48
End: 2023-12-22
Payer: COMMERCIAL

## 2023-12-22 VITALS
WEIGHT: 141 LBS | HEIGHT: 63 IN | DIASTOLIC BLOOD PRESSURE: 70 MMHG | SYSTOLIC BLOOD PRESSURE: 106 MMHG | OXYGEN SATURATION: 99 % | TEMPERATURE: 99 F | HEART RATE: 70 BPM | BODY MASS INDEX: 24.98 KG/M2

## 2023-12-22 DIAGNOSIS — J02.9 PHARYNGITIS, UNSPECIFIED ETIOLOGY: Primary | ICD-10-CM

## 2023-12-22 DIAGNOSIS — J01.90 ACUTE BACTERIAL SINUSITIS: ICD-10-CM

## 2023-12-22 DIAGNOSIS — J32.4 CHRONIC PANSINUSITIS: ICD-10-CM

## 2023-12-22 DIAGNOSIS — B96.89 ACUTE BACTERIAL SINUSITIS: ICD-10-CM

## 2023-12-22 PROCEDURE — 99214 OFFICE O/P EST MOD 30 MIN: CPT | Performed by: STUDENT IN AN ORGANIZED HEALTH CARE EDUCATION/TRAINING PROGRAM

## 2023-12-22 PROCEDURE — 3074F SYST BP LT 130 MM HG: CPT | Performed by: STUDENT IN AN ORGANIZED HEALTH CARE EDUCATION/TRAINING PROGRAM

## 2023-12-22 PROCEDURE — 3008F BODY MASS INDEX DOCD: CPT | Performed by: STUDENT IN AN ORGANIZED HEALTH CARE EDUCATION/TRAINING PROGRAM

## 2023-12-22 PROCEDURE — 3078F DIAST BP <80 MM HG: CPT | Performed by: STUDENT IN AN ORGANIZED HEALTH CARE EDUCATION/TRAINING PROGRAM

## 2023-12-22 RX ORDER — FLUTICASONE PROPIONATE 50 MCG
2 SPRAY, SUSPENSION (ML) NASAL DAILY
Qty: 3 EACH | Refills: 3 | Status: SHIPPED | OUTPATIENT
Start: 2023-12-22

## 2023-12-22 RX ORDER — AZELASTINE HYDROCHLORIDE 137 UG/1
1-2 SPRAY, METERED NASAL 2 TIMES DAILY
Qty: 30 ML | Refills: 3 | Status: SHIPPED | OUTPATIENT
Start: 2023-12-22

## 2023-12-22 RX ORDER — AMOXICILLIN AND CLAVULANATE POTASSIUM 875; 125 MG/1; MG/1
1 TABLET, FILM COATED ORAL 2 TIMES DAILY
Qty: 20 TABLET | Refills: 0 | Status: SHIPPED | OUTPATIENT
Start: 2023-12-22 | End: 2024-01-01

## 2023-12-28 ENCOUNTER — HOSPITAL ENCOUNTER (OUTPATIENT)
Dept: MAMMOGRAPHY | Facility: HOSPITAL | Age: 48
Discharge: HOME | End: 2023-12-28
Attending: INTERNAL MEDICINE
Payer: COMMERCIAL

## 2023-12-28 ENCOUNTER — HOSPITAL ENCOUNTER (OUTPATIENT)
Dept: ULTRASOUND IMAGING | Facility: HOSPITAL | Age: 48
Discharge: HOME | End: 2023-12-28
Attending: INTERNAL MEDICINE
Payer: COMMERCIAL

## 2023-12-28 DIAGNOSIS — R92.8 FOLLOW-UP EXAMINATION OF ABNORMAL MAMMOGRAM: ICD-10-CM

## 2023-12-28 PROCEDURE — 77062 BREAST TOMOSYNTHESIS BI: CPT | Performed by: INTERNAL MEDICINE

## 2023-12-28 PROCEDURE — 77066 DX MAMMO INCL CAD BI: CPT | Performed by: INTERNAL MEDICINE

## 2023-12-28 PROCEDURE — 76642 ULTRASOUND BREAST LIMITED: CPT | Performed by: INTERNAL MEDICINE

## 2023-12-29 NOTE — DISCHARGE INSTRUCTIONS
The Doctor (Radiologist) who performed your procedure was: DR VEGA    Place an ice pack over the biopsy site on top of your bra or on top of the ACE wrap (never apply ice directly over skin) for 10-15 minutes of every hour until bedtime for your comfort and to decrease bleeding.  Keep your sports bra or the ACE wrap (stereotactic and MRI biopsy) in place for 24 hours after your biopsy. This compression decreases bleeding and breast movement for your comfort. Wear a supportive bra for the next couple of days for comfort (sports bra for sleep).   Continue to wear, preferably, a sports bra or good supportive bra for 1 week and take off only to shower.  No baths or showers during the first 24 hours after biopsy. After this time you may take a shower. It's okay if the strips get wet but do not soak them. NO saunas, hot tubs or swimming until steri-strips fall off (approx. 5 days). This prevents infection and allows time for them to completely close and heal.  DO NOT remove the steri-strips. They will fall off in 5 days. If any type of irritation (redness, itching or blisters) develops in the area around the steri-strips, remove them gently. If the steri-strips do not fall off after 5 days, gently remove them. Keep the area clean and dry.  It is normal to have mild discomfort and bruising at the biopsy site.  You may take Tylenol as needed for discomfort, as long as you have no allergies to Tylenol. Do not take aspirin, motrin, ibuprofen or any medication containing NSAID (non-steroidal anti-inflammatory drug) product for 48 hours.  DO NOT participate in strenuous activity (aerobics, heavy lifting, housework, gardening, etc.) 48 hours after your biopsy to prevent bleeding.  You will receive results in 2-3 business days.  If you are having an MRI breast biopsy or an Ultrasound guided breast biopsy, you will be billed for the biopsy and unilateral mammogram separately.  If you have any questions about the procedure or  your results, please contact the Breast Care Coordinator Nurse at [phone].  Notify your ordering physician or primary physician for increased bleeding, pain or fever over 100. Or contact a Radiology Nurse at [phone] between 8am-4pm (after 4pm, your call will be directed to the [facility]).

## 2024-01-02 ENCOUNTER — TELEPHONE (OUTPATIENT)
Dept: MAMMOGRAPHY | Facility: HOSPITAL | Age: 49
End: 2024-01-02

## 2024-01-02 NOTE — TELEPHONE ENCOUNTER
Called to inform me took 1 ibuprofen on Saturday at 12 pm. She was informed we would need to contact procedural md  to determine if bx can done . Concepción Santana informed we would need to call her back

## 2024-01-03 ENCOUNTER — TELEPHONE (OUTPATIENT)
Dept: MAMMOGRAPHY | Facility: HOSPITAL | Age: 49
End: 2024-01-03

## 2024-01-03 NOTE — TELEPHONE ENCOUNTER
I returned call Dr Burgess informed pt took advil on Saturday.  Dr Burgess reviewed images area is near a lot of vessel  and it does  increase risk bleeding during and post Bx biopsy but that would be a risk no matter if she took the advil Biopsy can proceed as along as pt informed. Pt was informed increased risk bleeding during and post and increased bruising Pt agrees to continues \"would like to proceed with the bx\"

## 2024-01-04 ENCOUNTER — HOSPITAL ENCOUNTER (OUTPATIENT)
Dept: MAMMOGRAPHY | Facility: HOSPITAL | Age: 49
Discharge: HOME | End: 2024-01-04
Attending: INTERNAL MEDICINE
Payer: COMMERCIAL

## 2024-01-04 DIAGNOSIS — R92.1 BREAST CALCIFICATION, LEFT: ICD-10-CM

## 2024-01-04 PROCEDURE — 88342 IMHCHEM/IMCYTCHM 1ST ANTB: CPT | Performed by: INTERNAL MEDICINE

## 2024-01-04 PROCEDURE — 88341 IMHCHEM/IMCYTCHM EA ADD ANTB: CPT | Performed by: INTERNAL MEDICINE

## 2024-01-04 PROCEDURE — 88305 TISSUE EXAM BY PATHOLOGIST: CPT | Performed by: INTERNAL MEDICINE

## 2024-01-04 PROCEDURE — 19081 BX BREAST 1ST LESION STRTCTC: CPT | Performed by: INTERNAL MEDICINE

## 2024-01-04 NOTE — PROCEDURES
[facility]  Procedure Note    Concepción Santana Patient Status:  Outpatient    1975 MRN [id number]   Location [facility] MAMMOGRAPHY - [facility] Attending Shlomo Granger MD   Hosp Day # 0 PCP Shlomo Granger MD     Procedure: Right breast stereotactic/soren guided biopsy    Pre-Procedure Diagnosis:  Right breast calcifications    Post-Procedure Diagnosis: Right breast calcifications    Anesthesia:  Local    Findings:  Right breast calcifications    Specimens: 2 cores    Blood Loss:  minimal    Tourniquet Time: none  Complications:  None  Drains:  none        Krystle Burgess MD  2024

## 2024-01-04 NOTE — IMAGING NOTE
History taken and as follows: CONCLUSION:     1. Grouped calcifications in the posterior right breast upper outer quadrant are suspicious.  Stereotactic/tomosynthesis guided biopsy is recommended     0812  Dr VEGA  here to explain risk and benefits of procedure. Questions answered by the devantecian    0756  Pt consented at  SITE MARKED RIGHT   Breast    0800 Post instructions  Given verbal et written AVS  summary sheet provided to patient. Patient verbalizes understanding and agreement.    Placed in chair  at  0807  scouts taken    0812 Time out taken    0820  Chloro prep as skin prep.   Lidocaine 1% 10  Milligrams per ml 5  ml given for superficial anesthetic affect at 0822     0822 Lidocaine  1% with epinephrine  1:100,000 units for deeper anesthetic affect 15 ML given.  More images taken after local  was given.    Sampling begins at 0824    0825 PT FEELING DIZZY, ICE PACK TO BACK OF NECK,  PT HAD  SYNCOPE EPISODE, BIOPSY NEED[facility] DR [facility] [place] , PT PLACED IN [place] POSITION WITH AMMONIA PACKET ACTIVATED , PT CAME TO,  AWAKE ABLE TO ANSWER QUESTIONS. LEGS ELEVATED PRESSURE HELD TO SITE OF BIOPSY, ICE PACKS TO CHEST AND BACK OF NECK     WHEN Sampling WAS completed     Core tainer taken to be imaged.    0833 VS BP 87/44 HR 90 PT AWAKE ALERT    0836 VS BP 87/44 HR 58    JUICE AND COOKIES GIVEN PO, PT SITTING UP RECLINED     0840 VS /48 HR 71    0845 DR VEGA HERE SPOKE WITH PT, DR SPANGLER OBTAIN 2 BIOPSY SPECIMENS , PT IN AGREEMENT TO NOT CONTINUE WILL WAIT FOR PATH REPORT AND FOLLOW UP THAT IS ADVISED     0915 Formalin added to samples.    UNABLE TO PLACE    BUCKLE  clip DUE TO SYNCOPAL EPISODE . Procedure completed . Pressure to site manually by nurse  and by machine compression for 10 minutes .    No active bleeding noted.  Area cleaned steri strips to site . Ice pack to site .    WHEN  post clip images  completed  Dressing dry and intact . Nipple markers removed by  MAMMO STAFF  Bra applied per patient. 6\" ace secured over bra by MAMMO STAFF     0907 PT FEELING BETTER POST IMAGES BEING DONE UP STANDING FOR POST PICTURES DENIES DIZZINESS OR LIGHTHEADEDNESS     PT   discharged BY GAEL PANDEY RN VIA  AMBULATORY ALERT STATED SHE WAS FEELING WELL THIS RN SPOKE WITH PT'S  ABOUT PROCEDURE AND ANSWERED QUESTIONS  PER REQUEST OF PAWAN      0928 Cores taken to pathology by  TOLU FUENTES

## 2024-01-08 ENCOUNTER — TELEPHONE (OUTPATIENT)
Dept: MAMMOGRAPHY | Facility: HOSPITAL | Age: 49
End: 2024-01-08

## 2024-01-08 NOTE — TELEPHONE ENCOUNTER
Concepción Santana  is s/p biopsy .  Phoned and introduced myself as breast coordinator .  Reinforced to patient  post biopsy care and instructions . Saw my chart abnormal  results discussed flat atypia and the need to  follow  up with a surgeon. She has an appt tomorrow will Dr Granger she will discuss and obtain a  name of a surgeon from Dr Granger and call and make an appt with the surgeon.  NO c/o post Bx.       Right breast, upper outer; stereotactic-guided core biopsy:   Fragments of breast tissue with focal flat epithelial atypia seen in a background of columnar cell change and adenosis with microcalcifications.  No definitive evidence of invasive carcinoma seen.       Comment:  A limited panel of immunohistochemical stains (appropriate control reactivity) is performed on block A1 for further characterization.  The flat epithelial atypia shows diffuse staining by ER and loss of staining by CK5.  These findings support the above diagnosis.       Clinical correlation with close clinical follow-up is recommended.     For  purposes, this case has been reviewed by another pathologist concurs with the above diagnosis.     Electronically signed by Jefe Henriquez MD on 1/8/2024 at 1645

## 2024-01-09 ENCOUNTER — OFFICE VISIT (OUTPATIENT)
Dept: INTERNAL MEDICINE CLINIC | Facility: CLINIC | Age: 49
End: 2024-01-09

## 2024-01-09 VITALS
HEART RATE: 52 BPM | BODY MASS INDEX: 25.43 KG/M2 | SYSTOLIC BLOOD PRESSURE: 118 MMHG | DIASTOLIC BLOOD PRESSURE: 74 MMHG | WEIGHT: 143.5 LBS | HEIGHT: 63 IN | RESPIRATION RATE: 16 BRPM

## 2024-01-09 DIAGNOSIS — R92.8 ABNORMAL MAMMOGRAM OF RIGHT BREAST: Primary | ICD-10-CM

## 2024-01-09 DIAGNOSIS — J02.9 SORE THROAT: ICD-10-CM

## 2024-01-09 PROCEDURE — 3078F DIAST BP <80 MM HG: CPT | Performed by: INTERNAL MEDICINE

## 2024-01-09 PROCEDURE — 99214 OFFICE O/P EST MOD 30 MIN: CPT | Performed by: INTERNAL MEDICINE

## 2024-01-09 PROCEDURE — 3074F SYST BP LT 130 MM HG: CPT | Performed by: INTERNAL MEDICINE

## 2024-01-09 PROCEDURE — 3008F BODY MASS INDEX DOCD: CPT | Performed by: INTERNAL MEDICINE

## 2024-01-09 NOTE — PROGRESS NOTES
Patient ID: Concepción Santana is a 48 year old female.  Chief Complaint   Patient presents with    Sore Throat        HISTORY OF PRESENT ILLNESS:   HPI  Patient presents for above.  Here for multiple issues.    Recently has had abnormal mammograms.  Biopsy was attempted today at 6 different size but patient had a vasovagal episode so procedure canceled after 2 biopsies done.  Needs referral to breast surgeon.    Complaining of pain in her throat on the left side ongoing for several days now.  Occasional cough without fevers or chills.  No sick contacts.    Review of Systems   Constitutional: Negative.    HENT:  Positive for sore throat.    Eyes: Negative.    Respiratory: Negative.     Cardiovascular: Negative.    Endocrine: Negative.    Genitourinary: Negative.    Musculoskeletal: Negative.    Allergic/Immunologic: Negative.    Neurological: Negative.    Hematological: Negative.      MEDICAL HISTORY:     Past Medical History:   Diagnosis Date    Ectopic pregnancy     Intestinal metaplasia of gastric mucosa     None in , repeat EGD in        Past Surgical History:   Procedure Laterality Date          COLONOSCOPY N/A 2019    Procedure: COLONOSCOPY;  Surgeon: JENI Baca MD;  Location: [facility] ENDOSCOPY    MEDHAT BIOPSY STEREO NODULE 1 SITE RIGHT (CPT=19081) Right 2024    calcs right breast  buckle clip    NEEDLE BIOPSY LEFT      age 18s    NEEDLE BIOPSY LEFT      between [age]         Current Outpatient Medications:     Azelastine HCl 137 MCG/SPRAY Nasal Solution, 1-2 sprays by Nasal route in the morning and 1-2 sprays before bedtime. FOR SINUS SYMPTOMS/NASAL CONGESTION.., Disp: 30 mL, Rfl: 3    pantoprazole 40 MG Oral Tab EC, Take 1 tablet (40 mg total) by mouth before breakfast., Disp: 90 tablet, Rfl: 3    fluticasone propionate 50 MCG/ACT Nasal Suspension, 2 sprays by Each Nare route daily. FOR NASAL CONGESTION/SINUS SYMPTOMS. (Patient not taking: Reported on 2024), Disp: 3 each,  Rfl: 3    Allergies:  Allergies   Allergen Reactions    Demerol Hcl [Meperidine] RASH       Social History     Socioeconomic History    Marital status:      Spouse name: Not on file    Number of children: Not on file    Years of education: Not on file    Highest education level: Not on file   Occupational History    Not on file   Tobacco Use    Smoking status: Never     Passive exposure: Never    Smokeless tobacco: Never   Substance and Sexual Activity    Alcohol use: Yes     Comment: Occ    Drug use: No    Sexual activity: Yes   Other Topics Concern    Grew up on a farm Not Asked    History of tanning Not Asked    Outdoor occupation Not Asked    Pt has a pacemaker No    Pt has a defibrillator No    Breast feeding Not Asked    Reaction to local anesthetic No   Social History Narrative    Not on file     Social Determinants of Health     Financial Resource Strain: Not on file   Food Insecurity: Not on file   Transportation Needs: Not on file   Physical Activity: Not on file   Stress: Not on file   Social Connections: Not on file   Housing Stability: Not on file           PHYSICAL EXAM:     Vitals:    01/09/24 1444   BP: 118/74   Pulse: 52   Resp: 16   Weight: 143 lb 8 oz (65.1 kg)   Height: 5' 3\" (1.6 m)       Body mass index is 25.42 kg/m².    Physical Exam  Constitutional:       Appearance: Normal appearance.   HENT:      Mouth/Throat:      Mouth: Mucous membranes are moist.      Pharynx: No oropharyngeal exudate or posterior oropharyngeal erythema.   Eyes:      General: No scleral icterus.  Cardiovascular:      Rate and Rhythm: Normal rate and regular rhythm.      Pulses: Normal pulses.      Heart sounds: Normal heart sounds. No murmur heard.  Pulmonary:      Effort: Pulmonary effort is normal. No respiratory distress.      Breath sounds: Normal breath sounds. No stridor. No wheezing or rhonchi.   Abdominal:      General: Abdomen is flat. Bowel sounds are normal.      Palpations: Abdomen is soft.    Neurological:      Mental Status: She is alert.   Psychiatric:         Mood and Affect: Mood normal.         Behavior: Behavior normal.           ASSESSMENT/PLAN:   1. Abnormal mammogram of right breast  Surgical Breast Oncology Referral - [place]    2. Sore throat  Exam is benign.  XR SOFT TISSUE NECK (CPT=70360); Future  ENT Referral - [place] [facility] ([facility])    Return if symptoms worsen or fail to improve.    This note was prepared using Dragon Medical voice recognition dictation software. As a result errors may occur. When identified these errors have been corrected. While every attempt is made to correct errors during dictation discrepancies may still exist.    Shlomo Granger MD  1/9/2024

## 2024-02-09 ENCOUNTER — OFFICE VISIT (OUTPATIENT)
Dept: SURGERY | Facility: CLINIC | Age: 49
End: 2024-02-09
Payer: COMMERCIAL

## 2024-02-09 VITALS
TEMPERATURE: 98 F | HEIGHT: 63 IN | SYSTOLIC BLOOD PRESSURE: 125 MMHG | RESPIRATION RATE: 16 BRPM | BODY MASS INDEX: 25.62 KG/M2 | OXYGEN SATURATION: 97 % | WEIGHT: 144.63 LBS | DIASTOLIC BLOOD PRESSURE: 79 MMHG | HEART RATE: 79 BPM

## 2024-02-09 DIAGNOSIS — N60.81 FLAT EPITHELIAL ATYPIA (FEA) OF RIGHT BREAST: Primary | ICD-10-CM

## 2024-02-09 PROCEDURE — 99244 OFF/OP CNSLTJ NEW/EST MOD 40: CPT | Performed by: SURGERY

## 2024-02-09 PROCEDURE — 3074F SYST BP LT 130 MM HG: CPT | Performed by: SURGERY

## 2024-02-09 PROCEDURE — 3078F DIAST BP <80 MM HG: CPT | Performed by: SURGERY

## 2024-02-09 PROCEDURE — 3008F BODY MASS INDEX DOCD: CPT | Performed by: SURGERY

## 2024-02-09 NOTE — PROGRESS NOTES
Breast Surgery New Patient Consultation    This is the first visit for this 48 year old woman, referred by Dr. Granger, who presents for evaluation of flat epithelial atypia of the right breast.    History of Present Illness:   Ms. Concepción Santana is a 48 year old woman who presents with imaging directed concern of the right breast.  The patient denies any palpable masses, nipple discharge, skin changes or axillary symptoms.  She has no personal prior history of breast disease or biopsies.  She does have a family history of breast cancer.  She had her imaging surveillance for calcifications had been followed on 2023.  She was noted to have a group of calcifications that were suspicious in the right breast upper outer quadrant as well as a stable clustered cyst in the right breast at 9:00 and an additional right breast cyst at 3:00.  She underwent a stereotactic biopsy on 2024 and was found to have focal flat epithelial atypia.  She is here today for evaluation and recommendations for further therapy.        Past Medical History:   Diagnosis Date    Ectopic pregnancy     Intestinal metaplasia of gastric mucosa     None in , repeat EGD in        Past Surgical History:   Procedure Laterality Date          COLONOSCOPY N/A 2019    Procedure: COLONOSCOPY;  Surgeon: JENI Baca MD;  Location: [facility] ENDOSCOPY    MEDHAT BIOPSY STEREO NODULE 1 SITE RIGHT (CPT=19081) Right 2024    calcs right breast  buckle clip    NEEDLE BIOPSY LEFT      age 20s    NEEDLE BIOPSY LEFT      between 20-30       Gynecological History:  Pt is a   Pt was 12 years old at time of first pregnancy.    She has cumulative breastfeeding history of 18 months.  She achieved menarche at age 12 and LMP 1/15/2024  She denies any history of hormone replacement therapy   She has history of oral contraceptive use for 1 years, last in .  She has recieved infertility treatment to achieve  pregnancy.    Medications:    No outpatient medications have been marked as taking for the 2/9/24 encounter (Appointment) with Whitney Parada MD.       Allergies:    Allergies   Allergen Reactions    Demerol Hcl [Meperidine] RASH       Family History:   Family History   Problem Relation Age of Onset    Breast Cancer Maternal Grandmother         50s       She is not of Ashkenazi Lutheran ancestry.    Social History:  History   Alcohol Use    Yes     Comment: Occ       History   Smoking Status    Never   Smokeless Tobacco    Never     Ms. Concepción Santana is  with 2 children. She has 4 siblings. She is currently Employed part-time    Review of Systems:  General:   The patient denies, fever, chills, night sweats, fatigue, generalized weakness, change in appetite or weight loss.    HEENT:     The patient denies eye irritation, cataracts, redness, glaucoma, yellowing of the eyes, change in vision, color blindness, or wearing contacts/glasses. The patient denies hearing loss, ringing in the ears, ear drainage, earaches, +nasal congestion, nose bleeds, +snoring, pain in mouth/throat, hoarseness, change in voice, facial trauma.    Respiratory:  The patient denies chronic cough, phlegm, hemoptysis, pleurisy/chest pain, pneumonia, asthma, wheezing, difficulty in breathing with exertion, emphysema, chronic bronchitis, shortness of breath or abnormal sound when breathing.     Cardiovascular:  There is no history of chest pain, chest pressure/discomfort, palpitations, irregular heartbeat, fainting or near-fainting, difficulty breathing when lying flat, SOB/Coughing at night, swelling of the legs or chest pain while walking.    Breasts:  See history of present illness    Gastrointestinal:     There is no history of difficulty or pain with swallowing, +reflux symptoms, vomiting, dark or bloody stools, constipation, yellowing of the skin, indigestion, nausea, change in bowel habits, diarrhea, abdominal pain or vomiting  blood.     Genitourinary:  The patient denies frequent urination, needing to get up at night to urinate, urinary hesitancy or retaining urine, painful urination, urinary incontinence, decreased urine stream, blood in the urine or vaginal/penile discharge.    Skin:    The patient denies rash, itching, skin lesions, dry skin, change in skin color or change in moles.     Hematologic/Lymphatic:  The patient denies easily bruising or bleeding or persistent swollen glands or lymph nodes.     Musculoskeletal:  The patient denies muscle aches/pain, joint pain, stiff joints, neck pain, back pain or bone pain.    Neuropsychiatric:  There is no history of migraines or severe headaches, seizure/epilepsy, speech problems, coordination problems, trembling/tremors, fainting/black outs, dizziness, memory problems, loss of sensation/numbness, problems walking, weakness, tingling or burning in hands/feet. There is no history of abusive relationship, bipolar disorder, sleep disturbance, anxiety, depression or feeling of despair.    Endocrine:    There is no history of poor/slow wound healing, weight loss/gain, fertility or hormone problems, cold intolerance, thyroid disease.     Allergic/Immunologic:  There is no history of hives, hay fever, angioedema or anaphylaxis.    /79 (BP Location: Left arm, Patient Position: Sitting, Cuff Size: adult)   Pulse 79   Temp 97.7 °F (36.5 °C) (Temporal)   Resp 16   Ht 1.6 m (5' 3\")   Wt 65.6 kg (144 lb 9.6 oz)   LMP 12/18/2023   SpO2 97%   BMI 25.61 kg/m²     Physical Exam:  The patient is an alert, oriented, well-nourished and  well-developed woman who appears her stated age. Her speech patterns and movements are normal. Her affect is appropriate.    HEENT: The head is normocephalic. The neck is supple. The thyroid is not enlarged and is without palpable masses/nodules. There are no palpable masses. The trachea is in the midline. Conjunctiva are clear, non-icteric.    Chest: The chest  expands symmetrically. The lungs are clear to auscultation.    Heart: The rhythm is regular.  There are no murmurs, rubs, gallops or thrills.    Breasts:  Her breasts are symmetrical with a cup size 32I.  Right breast: The skin, nipple ,and areola appear normal. There is no skin dimpling with movement of the pectoralis. There is no nipple retraction. No nipple discharge can be elicited. The parenchyma is mildly nodular. There are no dominant masses in the breast. The axillary tail is normal.  Left breast:   The skin, nipple, and areola appear normal. There is no skin dimpling with movement of the pectoralis. There is no nipple retraction. No nipple discharge can be elicited. The parenchyma is mildly nodular. There are no dominant masses in the breast. The axillary tail is normal.    Abdomen:  The abdomen is soft, flat and non tender. The liver is not enlarged. There are no palpable masses.    Lymph Nodes:  The supraclavicular, axillary and cervical regions are free of significant lymphadenopathy.    Back: There is no vertebral column tenderness.    Skin: The skin appears normal. There are no suspicious appearing rashes or lesions.    Extremities: The extremities are without deformity, cyanosis or edema.    Impression:   Ms. Concepción Santana is a 48 year old woman presents with imaging detected right breast flat epithelial atypia.    Discussion and Plan:  I had a discussion with the Patient regarding her breast exam. On exam today I found her to be healing well since the biopsy with no other clinical findings.  I personally reviewed the recent imaging and pathology we discussed this at length.    We discussed the significance and implications of the atypia including possible association with a intraductal malignancy.  I recommended an MRI to delineate extent disease.  We discussed the patient will likely require right breast wire localized excision of the area and that the MRI will be used to delineate the extent of  required excision and exclude contralateral concerns.   I will contact her with the results when they are available to direct her plan further.  The risks and possible complications of the procedure were explained to the patient and her family and she understood and agreed to the proposed plan. She was given ample opportunity for questions and those questions were answered to her satisfaction. She has been  encouraged to contact the office with any questions or concerns prior to her next appointment.

## 2024-02-13 PROBLEM — N60.81 FLAT EPITHELIAL ATYPIA (FEA) OF RIGHT BREAST: Status: ACTIVE | Noted: 2024-02-13

## 2024-02-20 ENCOUNTER — HOSPITAL ENCOUNTER (OUTPATIENT)
Dept: MRI IMAGING | Facility: HOSPITAL | Age: 49
Discharge: HOME | End: 2024-02-20
Attending: SURGERY
Payer: COMMERCIAL

## 2024-02-20 DIAGNOSIS — N60.81 FLAT EPITHELIAL ATYPIA (FEA) OF RIGHT BREAST: ICD-10-CM

## 2024-02-20 PROCEDURE — 77049 MRI BREAST C-+ W/CAD BI: CPT | Performed by: SURGERY

## 2024-02-20 PROCEDURE — A9575 INJ GADOTERATE MEGLUMI 0.1ML: HCPCS | Performed by: SURGERY

## 2024-02-20 RX ORDER — GADOTERATE MEGLUMINE 376.9 MG/ML
15 INJECTION INTRAVENOUS
Status: COMPLETED | OUTPATIENT
Start: 2024-02-20 | End: 2024-02-20

## 2024-02-20 RX ADMIN — GADOTERATE MEGLUMINE 13 ML: 376.9 INJECTION INTRAVENOUS at 16:48:00

## 2024-02-21 DIAGNOSIS — R92.8 ABNORMAL MRI, BREAST: Primary | ICD-10-CM

## 2024-02-21 NOTE — IMAGING NOTE
Discussed recommended breast biopsy with patient.  Patient is being recommended for MRI biopsy of the left  Breast  by Dr. Mirza . Pt was Provided Friday 2/23 checking in a dx main at 1030 am.  Pt history discussed as below:  Pt history of biopsy: yes rt breast dx flat epithelial atypia 1/4/24 .  She is  spouses name is Devon.  She has 2 sons ages 8 and 6 . She works pt in mortgages. Pt history discussed as below:  Pt history of biopsy: yes when she was 16 benign left    and    recent bx rt breast  show flat  atypia                                                                               Family history of cancer: no   Pt history of breast cancer:no  Bcp  short time hrt no ivf yes 4 rounds plus p 1 oral  round possibly   clomid?  MRI-GUIDED BREAST BIOPSY: LEFT BREAST       Recommedations :                      see [facility] for dictated radiology report   Reviewed pertinent patient history, family history of cancer, and patient medications  Discussed with patient Radiology’s protocol regarding medications, as well as over-the-counter vitamin or herbal supplements, as follows:  -All herbal supplements, Vitamin E, Fish Oil    -All NSAIDs (Ibuprofen, Motrin, Advil, Aleve, or other antiinflammatory medication)  and Aspirin  81mg currently being taken    not  recommended or prescribed by  your physician  should be held for 5  days prior to biopsy.  Denies usage   -Aspirin 81 mg being taken related to a cardiac condition  or prescribed by your  physician should be held at the  direction of your physician.  Informed patient to call ordering physician for guidelines denies usage    -Blood thinners/antiplatelet medications (Coumadin, Plavix  ect) should be held at the  direction of your physician.  Informed patient to call ordering physician for guidelines denies usage     Reviewed MRI (MAGNETIC RESONANCE IMAGING) biopsy procedure, as below.  For this procedure, you will be lying on your stomach with your breast in  compression .    AN IV WILL BE STARTED WHEN YOU ARRIVE AND YOUR KIDNEY FUNCTION WILL BE CHECK IF NOT DONE PREVIOUSLY. ENCOURAGED PATIENT TO KEEP WELL HYDRATED 2-3 DAYS BEFORE THE BIOPSY TO AID WITH  PROMOTING ADEQUATE KIDNEY FUNCTION WHILE RECEIVING CONTRAST. You will receive contrast to assist locating the area to be biopsied.  Mri  imaging will be   reviewed and compared to previous mri.   Once site is confirmed the Radiologist will then inject a local numbing medication into the area and then use a needle to collect cells from the site.  A marker, or clip, will then be placed in the biopsied area.  This marker is placed so this biopsy site is able to be accurately located upon future breast imaging.  After the clip is placed, the RN will place a dressing on the biopsy site.  Additional mammography films will then be taken to assure correct placement of the marker.    Educated the patient they will be awake during this procedure and are able to drive themselves home if they wish.    Educated patient that some soreness may occur after biopsy.  Discussed use of a supportive bra and ice packs after procedure, to decrease soreness.    Discussed with patient no swimming, bathing,  hot tubs , or submerging underwater for 5 days and   until the incision is closed and healed.  Educated patient on lifting restrictions - nothing heavier than a gallon of milk for 24-48 hours after the procedure.      Discussed with patient that some soreness and bruising is normal after biopsy but that prolonged or increased pain and bruising should be reported to the ordering physician.  An ace wrap will be applied over bra for added support and should be left on for 24 hours post procedure.  Reviewed results process with patient and shared that pathology results will be available within 2-3 business days of their biopsy.  Discussed results will be communicated by their ordering physician unless otherwise indicated.  Educated patient that  once we receive an order from their physician our radiology secretaries will be calling them to schedule their biopsy. iscussed recommended breast biopsy with patient.

## 2024-02-22 NOTE — DISCHARGE INSTRUCTIONS
The Doctor (Radiologist) who performed your procedure was: DR. VEGA    Place an ice pack over the biopsy site on top of your bra or on top of the ACE wrap (never apply ice directly over skin) for 10-15 minutes of every hour until bedtime for your comfort and to decrease bleeding.  Keep your sports bra or the ACE wrap (stereotactic and MRI biopsy) in place for 24 hours after your biopsy. This compression decreases bleeding and breast movement for your comfort. Wear a supportive bra for the next couple of days for comfort (sports bra for sleep).   Continue to wear, preferably, a sports bra or good supportive bra for 1 week and take off only to shower.  No baths or showers during the first 24 hours after biopsy. After this time you may take a shower. It's okay if the strips get wet but do not soak them. NO saunas, hot tubs or swimming until steri-strips fall off (approx. 5 days). This prevents infection and allows time for them to completely close and heal.  DO NOT remove the steri-strips. They will fall off in 5 days. If any type of irritation (redness, itching or blisters) develops in the area around the steri-strips, remove them gently. If the steri-strips do not fall off after 5 days, gently remove them. Keep the area clean and dry.  It is normal to have mild discomfort and bruising at the biopsy site.  You may take Tylenol as needed for discomfort, as long as you have no allergies to Tylenol. Do not take aspirin, motrin, ibuprofen or any medication containing NSAID (non-steroidal anti-inflammatory drug) product for 48 hours.  DO NOT participate in strenuous activity (aerobics, heavy lifting, housework, gardening, etc.) 48 hours after your biopsy to prevent bleeding.  You will receive results in 2-3 business days.  If you are having an MRI breast biopsy or an Ultrasound guided breast biopsy, you will be billed for the biopsy and unilateral mammogram separately.  If you have any questions about the procedure  or your results, please contact the Breast Care Coordinator Nurse at [phone].  Notify your ordering physician or primary physician for increased bleeding, pain or fever over 100. Or contact a Radiology Nurse at [phone] between 8am-4pm (after 4pm, your call will be directed to the [facility]).

## 2024-02-23 ENCOUNTER — HOSPITAL ENCOUNTER (OUTPATIENT)
Dept: MAMMOGRAPHY | Facility: HOSPITAL | Age: 49
Discharge: HOME | End: 2024-02-23
Payer: COMMERCIAL

## 2024-02-23 ENCOUNTER — HOSPITAL ENCOUNTER (OUTPATIENT)
Dept: MRI IMAGING | Facility: HOSPITAL | Age: 49
Discharge: HOME | End: 2024-02-23
Payer: COMMERCIAL

## 2024-02-23 DIAGNOSIS — R92.8 ABNORMAL MRI, BREAST: ICD-10-CM

## 2024-02-23 PROCEDURE — 77065 DX MAMMO INCL CAD UNI: CPT

## 2024-02-23 PROCEDURE — 19085 BX BREAST 1ST LESION MR IMAG: CPT

## 2024-02-23 PROCEDURE — A9575 INJ GADOTERATE MEGLUMI 0.1ML: HCPCS

## 2024-02-23 PROCEDURE — 88305 TISSUE EXAM BY PATHOLOGIST: CPT

## 2024-02-23 RX ORDER — GADOTERATE MEGLUMINE 376.9 MG/ML
15 INJECTION INTRAVENOUS
Status: COMPLETED | OUTPATIENT
Start: 2024-02-23 | End: 2024-02-23

## 2024-02-23 RX ADMIN — GADOTERATE MEGLUMINE 13 ML: 376.9 INJECTION INTRAVENOUS at 12:32:00

## 2024-02-23 NOTE — PROCEDURES
[facility]  Procedure Note    Concepción Santana Patient Status:  Outpatient    1975 MRN [id number]   Location [facility] MRI Attending Madie Aguilar APRN    Day # 0 PCP Shlomo Granger MD     Procedure: Left MRI guided breast biopsy    Pre-Procedure Diagnosis:  Left breast non mass enhancement    Post-Procedure Diagnosis: Left breast non mass enhancement    Anesthesia:  Local    Findings:  Left breast non mass enhancement    Specimens: multiple cores    Blood Loss:  minimal    Tourniquet Time: none  Complications:  None  Drains:  none      Krystle Burgess MD  2024

## 2024-02-23 NOTE — IMAGING NOTE
To Radiology holding area hx as follow: CONCLUSION:       A single site MRI guided left breast biopsy is recommended for 1.5 centimeter clumped non mass enhancement in the slightly upper anterior subareolar left breast (series 40997 image 100 and series 16, image 75)     1130  IV 22 gauge started by JOSÉ MANUEL uma information technology TECH     1145  Dr VEGA  here procedure explained questions answered .    1108  Consent verified and obtained  Site marked LEFT  Breast    1147 Time out taken    1156  Patient to mri table   scans taken.    1212  Betadine as skin prep.    1213 Lidocaine 1% 10 milligrams per ml 5 ml given.    1214 Lidocaine 1% with epinephrine 1:100,000 units 200 milligrams per 20 ml  Total amount given 15 ml.    1215   9 Gauge needle placed     1216  Sampling begins     1223  Sampling completed.    1218  Formalin added to samples    1257  Cores taken to pathology by STEFFANIE HURTADO.    STOPLIGHT Breast clip placed at 1220 . Procedure complete pressure to site 10 minutes .  Area  Cleaned steri strips to site.  Ice pack to site.Iv removed by MRI staff.      1247  To mammography department for post clip images.  Report to hospitals  MAMMO  technologist AIDE     WHEN Mammogram completed. Bb nipple marker  removed from nipple by MAMMO STAFF  Dressing dry and intact. Bra applied by patient. A 6 \" ace wrap secured over Bra.     1109 Post instructions given verbal et written. Pt verbalizes understanidng and agreement.     PT TO BE Discharged BY MAMMO STAFF

## 2024-02-26 ENCOUNTER — TELEPHONE (OUTPATIENT)
Dept: SURGERY | Facility: CLINIC | Age: 49
End: 2024-02-26

## 2024-02-26 NOTE — TELEPHONE ENCOUNTER
I spoke with the patient regarding biopsy findings. Patient informed that Bilateral excision with localization is an option. If she chooses observation instead that would require 6 m surveillance MRI and Jayjay to assess stability of the enhancement and calcifications. She will contact us with her decision.

## 2024-04-19 ENCOUNTER — TELEPHONE (OUTPATIENT)
Dept: SURGERY | Facility: CLINIC | Age: 49
End: 2024-04-19

## 2024-04-19 ENCOUNTER — DOCUMENTATION ONLY (OUTPATIENT)
Dept: SURGERY | Facility: CLINIC | Age: 49
End: 2024-04-19

## 2024-04-19 DIAGNOSIS — N60.92 ATYPICAL LOBULAR HYPERPLASIA (ALH) OF LEFT BREAST: ICD-10-CM

## 2024-04-19 DIAGNOSIS — N60.81 FLAT EPITHELIAL ATYPIA (FEA) OF RIGHT BREAST: Primary | ICD-10-CM

## 2024-04-19 NOTE — TELEPHONE ENCOUNTER
Called patient to discuss preop instructions.  Patient verbalized understanding.  Beverly, our surgery scheduler, will be calling her to get her scheduled for surgery.

## 2024-04-19 NOTE — PATIENT INSTRUCTIONS
Dr. Whitney Parada  Tel: [phone]  Fax: [phone] [place] 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 2nd site: Left breast, subareolar position, stoplight shaped clip, biopsy demonstrates atypical lobular hyperplasia.    _______________________________________________________________________________    Someone must accompany you the day of the procedure to drive you home safely, because of anesthesia.  You will need an adult  to stay with you the first night following your surgery.  You must remove any kind of makeup, acrylic nails, lotions, powders, creams or deodorant.  [facility] ONLY: Pre-admission will give instruct you on when to take Gatorade and Tylenol/acetaminophen prior to your surgery, purchase 2 - 12oz bottles of regular Gatorade (NOT RED/SUGAR FREE). Otherwise, you may not eat or drink anything else after 11PM the night before surgery.    [facility] ONLY: You may not eat or drink anything after midnight the day of your surgery.   Wear comfortable clothing that can be easily removed.  If you wear dentures, contacts lenses, or any prosthesis, you will be asked to remove them.  Do not drink alcohol or smoke 24 hours prior to your procedure.  Bring a picture ID and your insurance card.  Covid-19 testing is no longer required before surgery unless you are experiencing symptoms such as fever, cough, congestion, etc.   The Pre-Admission Testing Department will call the day before to  confirm your procedure, give you the time you need to arrive by and directions on where to go. They begin making calls after 2pm, if you are not contacted by 4pm, please call the surgeon's office listed above.  Do not take any blood thinners at least one week prior to the procedure/surgery. This includes aspirin, baby aspirin, Ibuprofen products, herbal supplements, diet medications, vitamin E, fish oil and green tea supplements. Please check other supplements for these ingredients. *TYLENOL or acetaminophen is acceptable*  If you take Coumadin, Plavix, Xarelto, or Eliquis, please contact your prescribing physician for special instructions on how long to hold. If you take insulin contact your primary care physician for special instructions.  Our surgery scheduler, Beverly, will be contacting you to discuss surgery dates. If you have any questions related to scheduling your surgery, please reach out to her at [phone].  _____________________________________________________________________  PRE-OPERATIVE TESTING IF INDICATED BELOW  PLEASE COMPLETE ASAP (AT LEAST 14-21 DAYS PRIOR TO SURGERY)  [] CBC [] BMP [] CMP [] EKG    [] PT, PTT, INR [] Cardiac Clearance  [] H&P Medical Clearance [] Chest X-ray     Please call Central Scheduling to schedule an appointment for pre-operative labs/tests @ [phone]  Does the patient have a pacemaker or ICD?           Does the patient have sleep apnea?  [] Yes   [x] No                               [] Yes   [x] No

## 2024-04-22 ENCOUNTER — TELEPHONE (OUTPATIENT)
Dept: SURGERY | Facility: CLINIC | Age: 49
End: 2024-04-22

## 2024-04-22 DIAGNOSIS — N60.81 FLAT EPITHELIAL ATYPIA (FEA) OF RIGHT BREAST: Primary | ICD-10-CM

## 2024-04-22 DIAGNOSIS — N60.92 ATYPICAL LOBULAR HYPERPLASIA (ALH) OF LEFT BREAST: ICD-10-CM

## 2024-04-22 NOTE — TELEPHONE ENCOUNTER
Calling pt in regards to scheduling surgery.  Informed pt that I have 06/03/2024 available at [facility] with Dr. Parada.  Pt verbalized understanding and in agreement with date and location.  All questions answered.   Encouraged pt to call or [facility] message office with any other questions or concerns.

## 2024-05-28 NOTE — DISCHARGE INSTRUCTIONS
HOME INSTRUCTIONS  Breast Surgery  Post-operative Instructions  Excisional Biopsy, Lumpectomy, Mastectomy, Marks Node Biopsy, or Axillary  Lymph Node Dissection  Whitney Parada MD  General Instructions  The following instructions will provide helpful information that will assist your recovery. These are designed to be general guidelines. Please remember that everyone heals and recovers differently. Listen to your body and rest when you are tired. If you have any questions or concerns, please do not hesitate to contact my office. I would like to see you in the office about one week after surgery, please schedule and appointment through my office to make a post-operative appointment if you do not already have one.     Restrictions  There are no lifting weight restrictions for the arm on the surgical side. You may gradually increase the amount of weight based on your comfort level. You should avoid a lot of repetitious activity with the arm until the drain is out (if one was placed) and the wound is well-healed (about two weeks).   You should not drive a car until you believe you can react to an emergency situation and you’re no longer taking narcotic pain medications.   You may shower the day after surgery. You should not bathe or swim (i.e. submerge wound) until the wound is well healed (about two weeks).  There are no dietary restrictions.    Exercise  You may begin arm exercises within a couple days. Do these 2 or 3 times per day, beginning with light exercise and gradually increase your range of motion and repetitions. This will help your arm regain full mobility. We will address your activity level again at your post-operative visit.   You will have pain medication prescribed before discharge. Take this as directed to relieve pain. It is important that you be comfortable so that you may continue your stretching exercises.   If you find the medication prescribed is too strong, try Tylenol (Acetaminophen)  or Ibuprofen.    Wound Care  You may remove the gauze dressing on the first or second postoperative day and then shower. You should leave the steri-strips in place; they will start to peel off about 10 days after your surgery. The stitches are all underneath the skin and will dissolve on their own. You will not need any stitches removed except if you have a drain in place.  I encourage you to shower once the outer bandage is removed, you may use soap and water directly over the steri-strips and pat dry following.  You should keep gauze dressing on the wound until the wound is completely dry and without drainage-usually 1-3 days.   If a surgical bra was placed after the surgery, I encourage you to wear it as much as possible during the week following the procedure (including during sleep). Alternatively, you may choose to wear your own bra provided it is comfortable, provides support and does not have an underwire. If the breast doesn’t move it is less painful.  If an elastic bandage was placed around your chest after the surgery you may remove it on the 1st or 2nd day after surgery. If you prefer to leave it on longer, you may.  It is normal to feel a lump in the area of the incisions for up to 6 months. This is part of the healing process. Eventually the breast will return to its normal condition.   Drain Care (if placed at time of axillary dissection or mastectomy)-This will be explained by your nurse prior to discharge.  Empty the drain and strip the tubing 2-3 times daily, more often if the plastic squeeze bottle fills up. This will prevent the tubing from clogging.   Starting at the top of the tubing next to your body firmly grasp the tubing with the index finger and thumb of one hand. With the other hand use the index finger and thumb to move the fluid down the tubing (this is called “stripping the tubing”).   Uncap the pouring spout and squeeze the contents of the plastic bottle into the measuring cup.    Squeeze the bottle flat to create suction and replace the cap while squeezing to maintain the vacuum.   Measure and record the output and discard the fluid into the toilet. Record the output each time you empty the bottle.   Keep track of the output (drainage) and when the total is down to 30 cc’s or less over a 24-hour period we’ll remove the drain in the office. This is usually after 1-2  weeks, but can be longer in certain patients.   Drain removal takes about 30 seconds and is virtually painless. The suture is cut and the drain slides right out. You should call my office as the output approaches 30 cc’s over 24 hours so that we may schedule an office visit for drain removal.  If you have had reconstruction your plastic surgeon will remove the drain according to their protocol.    Pain Medication  You will be given a prescription for a narcotic pain medication (usually Norco) upon discharge. Many patients have very little pain and don’t want to use the narcotic. Don’t be afraid to use it if you’re uncomfortable. If you’d prefer you may substitute Tylenol or Ibuprofen (Motrin, Advil). Using an ice pack for a few minutes over the incision can also alleviate pain. If you do use the narcotic medication, use an over the counter stool softener or gentle laxative and stay well-hydrated as constipation is not uncommon with narcotics.    Pathology Report  The Pathology report is usually available 4-5 business days following the surgery. I will call you  with the results once the report is available.    Notify my office if:   Your temperature is over 101.5 F   You notice increasing swelling, redness, warmth or drainage from around the incision or drain site.    If you experience any problems please call my office and either my nurse or myself will respond. After hours, you will be forwarded to my answering service which will help you get in touch with myself or the physician covering for me.      [facility]  INSTRUCTIONS: POST-OP ANESTHESIA  The medication that you received for sedation or general anesthesia can last up to 24 hours. Your judgment and reflexes may be altered, even if you feel like your normal self.      We Recommend:   Do not drive any motor vehicle or bicycle   Avoid mowing the lawn, playing sports, or working with power tools/applicances (power saws, electric knives or mixers)   That you have someone stay with you on your first night home   Do not drink alcohol or take sleeping pills or tranquilizers   Do not sign legal documents within 24 hours of your procedure   If you had a nerve block for your surgery, take extra care not to put any pressure on your arm or hand for 24 hours    It is normal:  For you to have a sore throat if you had a breathing tube during surgery (while you were asleep!). The sore throat should get better within 48 hours. You can gargle with warm salt water (1/2 tsp in 4 oz warm water) or use a throat lozenge for comfort  To feel muscle aches or soreness especially in the abdomen, chest or neck. The achy feeling should go away in the next 24 hours  To feel weak, sleepy or \"wiped out\". Your should start feeling better in the next 24 hours.   To experience mild discomforts such as sore lip or tongue, headache, cramps, gas pains or a bloated feeling in your abdomen.   To experience mild back pain or soreness for a day or two if you had spinal or epidural anesthesia.   If you had laparoscopic surgery, to feel shoulder pain or discomfort on the day of surgery.   For some patients to have nausea after surgery/anesthesia    If you feel nausea or experience vomiting:   Try to move around less.   Eat less than usual or drink only liquids until the next morning   Nausea should resolve in about 24 hours    If you have a problem when you are at home:    Call your surgeons office   Discharge Instructions: After Your Surgery  You’ve just had surgery. During surgery, you were given medicine  called anesthesia to keep you relaxed and free of pain. After surgery, you may have some pain or nausea. This is common. Here are some tips for feeling better and getting well after surgery.   Going home  Your healthcare provider will show you how to take care of yourself when you go home. They'll also answer your questions. Have an adult family member or friend drive you home. For the first 24 hours after your surgery:   Don't drive or use heavy equipment.  Don't make important decisions or sign legal papers.  Take medicines as directed.  Don't drink alcohol.  Have someone stay with you, if needed. They can watch for problems and help keep you safe.  Be sure to go to all follow-up visits with your healthcare provider. And rest after your surgery for as long as your provider tells you to.   Coping with pain  If you have pain after surgery, pain medicine will help you feel better. Take it as directed, before pain becomes severe. Also, ask your healthcare provider or pharmacist about other ways to control pain. This might be with heat, ice, or relaxation. And follow any other instructions your surgeon or nurse gives you.      Stay on schedule with your medicine.     Tips for taking pain medicine  To get the best relief possible, remember these points:   Pain medicines can upset your stomach. Taking them with a little food may help.  Most pain relievers taken by mouth need at least 20 to 30 minutes to start to work.  Don't wait till your pain becomes severe before you take your medicine. Try to time your medicine so that you can take it before starting an activity. This might be before you get dressed, go for a walk, or sit down for dinner.  Constipation is a common side effect of some pain medicines. Call your healthcare provider before taking any medicines such as laxatives or stool softeners to help ease constipation. Also ask if you should skip any foods. Drinking lots of fluids and eating foods such as fruits and  vegetables that are high in fiber can also help. Remember, don't take laxatives unless your surgeon has prescribed them.  Drinking alcohol and taking pain medicine can cause dizziness and slow your breathing. It can even be deadly. Don't drink alcohol while taking pain medicine.  Pain medicine can make you react more slowly to things. Don't drive or run machinery while taking pain medicine.  Your healthcare provider may tell you to take acetaminophen to help ease your pain. Ask them how much you're supposed to take each day. Acetaminophen or other pain relievers may interact with your prescription medicines or other over-the-counter (OTC) medicines. Some prescription medicines have acetaminophen and other ingredients in them. Using both prescription and OTC acetaminophen for pain can cause you to accidentally overdose. Read the labels on your OTC medicines with care. This will help you to clearly know the list of ingredients, how much to take, and any warnings. It may also help you not take too much acetaminophen. If you have questions or don't understand the information, ask your pharmacist or healthcare provider to explain it to you before you take the OTC medicine.   Managing nausea  Some people have an upset stomach (nausea) after surgery. This is often because of anesthesia, pain, or pain medicine, less movement of food in the stomach, or the stress of surgery. These tips will help you handle nausea and eat healthy foods as you get better. If you were on a special food plan before surgery, ask your healthcare provider if you should follow it while you get better. Check with your provider on how your eating should progress. It may depend on the surgery you had. These general tips may help:   Don't push yourself to eat. Your body will tell you when to eat and how much.  Start off with clear liquids and soup. They're easier to digest.  Next try semi-solid foods as you feel ready. These include mashed potatoes,  applesauce, and gelatin.  Slowly move to solid foods. Don’t eat fatty, rich, or spicy foods at first.  Don't force yourself to have 3 large meals a day. Instead eat smaller amounts more often.  Take pain medicines with a small amount of solid food, such as crackers or toast. This helps prevent nausea.  When to call your healthcare provider  Call your healthcare provider right away if you have any of these:   You still have too much pain, or the pain gets worse, after taking the medicine. The medicine may not be strong enough. Or there may be a complication from the surgery.  You feel too sleepy, dizzy, or groggy. The medicine may be too strong.  Side effects such as nausea or vomiting. Your healthcare provider may advise taking other medicines to .  Skin changes such as rash, itching, or hives. This may mean you have an allergic reaction. Your provider may advise taking other medicines.  The incision looks different (for instance, part of it opens up).  Bleeding or fluid leaking from the incision site, and weren't told to expect that.  Fever of 100.4°F (38°C) or higher, or as directed by your provider.  Call 911  Call 911 right away if you have:   Trouble breathing  Facial swelling    If you have obstructive sleep apnea   You were given anesthesia medicine during surgery to keep you comfortable and free of pain. After surgery, you may have more apnea spells because of this medicine and other medicines you were given. The spells may last longer than normal.    At home:  Keep using the continuous positive airway pressure (CPAP) device when you sleep. Unless your healthcare provider tells you not to, use it when you sleep, day or night. CPAP is a common device used to treat obstructive sleep apnea.  Talk with your provider before taking any pain medicine, muscle relaxants, or sedatives. Your provider will tell you about the possible dangers of taking these medicines.  Contact your provider if your sleeping changes a  lot even when taking medicines as directed.  Ming last reviewed this educational content on 10/1/2021  © 8930-9130 [employer]. All rights reserved. This information is not intended as a substitute for professional medical care. Always follow your healthcare professional's instructions.

## 2024-06-03 ENCOUNTER — APPOINTMENT (OUTPATIENT)
Dept: MAMMOGRAPHY | Facility: HOSPITAL | Age: 49
End: 2024-06-03
Attending: SURGERY
Payer: COMMERCIAL

## 2024-06-03 ENCOUNTER — HOSPITAL ENCOUNTER (OUTPATIENT)
Facility: HOSPITAL | Age: 49
Setting detail: HOSPITAL OUTPATIENT SURGERY
Discharge: HOME | End: 2024-06-03
Attending: SURGERY | Admitting: SURGERY
Payer: COMMERCIAL

## 2024-06-03 ENCOUNTER — HOSPITAL ENCOUNTER (OUTPATIENT)
Dept: MAMMOGRAPHY | Facility: HOSPITAL | Age: 49
Discharge: HOME | End: 2024-06-03
Attending: SURGERY
Payer: COMMERCIAL

## 2024-06-03 ENCOUNTER — ANESTHESIA EVENT (OUTPATIENT)
Dept: SURGERY | Facility: HOSPITAL | Age: 49
End: 2024-06-03
Payer: COMMERCIAL

## 2024-06-03 ENCOUNTER — ANESTHESIA (OUTPATIENT)
Dept: SURGERY | Facility: HOSPITAL | Age: 49
End: 2024-06-03
Payer: COMMERCIAL

## 2024-06-03 VITALS
RESPIRATION RATE: 16 BRPM | TEMPERATURE: 97 F | DIASTOLIC BLOOD PRESSURE: 60 MMHG | HEART RATE: 71 BPM | OXYGEN SATURATION: 99 % | SYSTOLIC BLOOD PRESSURE: 101 MMHG | HEIGHT: 63 IN | WEIGHT: 142 LBS | BODY MASS INDEX: 25.16 KG/M2

## 2024-06-03 DIAGNOSIS — N60.81 FLAT EPITHELIAL ATYPIA (FEA) OF RIGHT BREAST: Primary | ICD-10-CM

## 2024-06-03 DIAGNOSIS — N60.92 ATYPICAL LOBULAR HYPERPLASIA (ALH) OF LEFT BREAST: ICD-10-CM

## 2024-06-03 DIAGNOSIS — N60.81 FLAT EPITHELIAL ATYPIA (FEA) OF RIGHT BREAST: ICD-10-CM

## 2024-06-03 LAB — B-HCG UR QL: NEGATIVE

## 2024-06-03 PROCEDURE — 76098 X-RAY EXAM SURGICAL SPECIMEN: CPT | Performed by: SURGERY

## 2024-06-03 PROCEDURE — 0HBV0ZZ EXCISION OF BILATERAL BREAST, OPEN APPROACH: ICD-10-PCS | Performed by: SURGERY

## 2024-06-03 PROCEDURE — 88307 TISSUE EXAM BY PATHOLOGIST: CPT | Performed by: SURGERY

## 2024-06-03 PROCEDURE — 19282 PERQ DEVICE BREAST EA IMAG: CPT | Performed by: SURGERY

## 2024-06-03 PROCEDURE — 81025 URINE PREGNANCY TEST: CPT

## 2024-06-03 PROCEDURE — 19281 PERQ DEVICE BREAST 1ST IMAG: CPT | Performed by: SURGERY

## 2024-06-03 RX ORDER — PROCHLORPERAZINE EDISYLATE 5 MG/ML
5 INJECTION INTRAMUSCULAR; INTRAVENOUS EVERY 8 HOURS PRN
Status: DISCONTINUED | OUTPATIENT
Start: 2024-06-03 | End: 2024-06-03

## 2024-06-03 RX ORDER — HYDROCODONE BITARTRATE AND ACETAMINOPHEN 5; 325 MG/1; MG/1
1-2 TABLET ORAL EVERY 6 HOURS PRN
Qty: 20 TABLET | Refills: 0 | Status: SHIPPED | OUTPATIENT
Start: 2024-06-03

## 2024-06-03 RX ORDER — HYDROMORPHONE HYDROCHLORIDE 1 MG/ML
0.2 INJECTION, SOLUTION INTRAMUSCULAR; INTRAVENOUS; SUBCUTANEOUS EVERY 5 MIN PRN
Status: DISCONTINUED | OUTPATIENT
Start: 2024-06-03 | End: 2024-06-03

## 2024-06-03 RX ORDER — EPHEDRINE SULFATE 50 MG/ML
25 INJECTION INTRAVENOUS ONCE
Status: COMPLETED | OUTPATIENT
Start: 2024-06-03 | End: 2024-06-03

## 2024-06-03 RX ORDER — MIDAZOLAM HYDROCHLORIDE 1 MG/ML
INJECTION INTRAMUSCULAR; INTRAVENOUS AS NEEDED
Status: DISCONTINUED | OUTPATIENT
Start: 2024-06-03 | End: 2024-06-03 | Stop reason: SURG

## 2024-06-03 RX ORDER — MORPHINE SULFATE 10 MG/ML
6 INJECTION, SOLUTION INTRAMUSCULAR; INTRAVENOUS EVERY 10 MIN PRN
Status: DISCONTINUED | OUTPATIENT
Start: 2024-06-03 | End: 2024-06-03

## 2024-06-03 RX ORDER — SODIUM CHLORIDE, SODIUM LACTATE, POTASSIUM CHLORIDE, CALCIUM CHLORIDE 600; 310; 30; 20 MG/100ML; MG/100ML; MG/100ML; MG/100ML
INJECTION, SOLUTION INTRAVENOUS CONTINUOUS
Status: DISCONTINUED | OUTPATIENT
Start: 2024-06-03 | End: 2024-06-03

## 2024-06-03 RX ORDER — BUPIVACAINE HYDROCHLORIDE 5 MG/ML
INJECTION, SOLUTION EPIDURAL; INTRACAUDAL AS NEEDED
Status: DISCONTINUED | OUTPATIENT
Start: 2024-06-03 | End: 2024-06-03 | Stop reason: HOSPADM

## 2024-06-03 RX ORDER — ONDANSETRON 2 MG/ML
4 INJECTION INTRAMUSCULAR; INTRAVENOUS EVERY 6 HOURS PRN
Status: DISCONTINUED | OUTPATIENT
Start: 2024-06-03 | End: 2024-06-03

## 2024-06-03 RX ORDER — NALOXONE HYDROCHLORIDE 0.4 MG/ML
0.08 INJECTION, SOLUTION INTRAMUSCULAR; INTRAVENOUS; SUBCUTANEOUS AS NEEDED
Status: DISCONTINUED | OUTPATIENT
Start: 2024-06-03 | End: 2024-06-03

## 2024-06-03 RX ORDER — MORPHINE SULFATE 2 MG/ML
2 INJECTION, SOLUTION INTRAMUSCULAR; INTRAVENOUS EVERY 10 MIN PRN
Status: DISCONTINUED | OUTPATIENT
Start: 2024-06-03 | End: 2024-06-03

## 2024-06-03 RX ORDER — ACETAMINOPHEN 500 MG
1000 TABLET ORAL ONCE
Status: COMPLETED | OUTPATIENT
Start: 2024-06-03 | End: 2024-06-03

## 2024-06-03 RX ORDER — HYDROMORPHONE HYDROCHLORIDE 1 MG/ML
0.6 INJECTION, SOLUTION INTRAMUSCULAR; INTRAVENOUS; SUBCUTANEOUS EVERY 5 MIN PRN
Status: DISCONTINUED | OUTPATIENT
Start: 2024-06-03 | End: 2024-06-03

## 2024-06-03 RX ORDER — LIDOCAINE HYDROCHLORIDE AND EPINEPHRINE 10; 10 MG/ML; UG/ML
INJECTION, SOLUTION INFILTRATION; PERINEURAL AS NEEDED
Status: DISCONTINUED | OUTPATIENT
Start: 2024-06-03 | End: 2024-06-03 | Stop reason: HOSPADM

## 2024-06-03 RX ORDER — HYDROMORPHONE HYDROCHLORIDE 1 MG/ML
0.4 INJECTION, SOLUTION INTRAMUSCULAR; INTRAVENOUS; SUBCUTANEOUS EVERY 5 MIN PRN
Status: DISCONTINUED | OUTPATIENT
Start: 2024-06-03 | End: 2024-06-03

## 2024-06-03 RX ORDER — MORPHINE SULFATE 4 MG/ML
4 INJECTION, SOLUTION INTRAMUSCULAR; INTRAVENOUS EVERY 10 MIN PRN
Status: DISCONTINUED | OUTPATIENT
Start: 2024-06-03 | End: 2024-06-03

## 2024-06-03 RX ADMIN — MIDAZOLAM HYDROCHLORIDE 2 MG: 1 INJECTION INTRAMUSCULAR; INTRAVENOUS at 13:33:00

## 2024-06-03 NOTE — H&P
History of Present Illness:   Ms. Concepción Santana is a 48 year old woman who presents with imaging directed concern of the right breast.  The patient denies any palpable masses, nipple discharge, skin changes or axillary symptoms.  She has no personal prior history of breast disease or biopsies.  She does have a family history of breast cancer.  She had her imaging surveillance for calcifications had been followed on 2023.  She was noted to have a group of calcifications that were suspicious in the right breast upper outer quadrant as well as a stable clustered cyst in the right breast at 9:00 and an additional right breast cyst at 3:00.  She underwent a stereotactic biopsy on 2024 and was found to have focal flat epithelial atypia.  She is here today for evaluation and recommendations for further therapy.        Past Medical History        Past Medical History:   Diagnosis Date    Ectopic pregnancy      Intestinal metaplasia of gastric mucosa      None in , repeat EGD in             Past Surgical History         Past Surgical History:   Procedure Laterality Date            COLONOSCOPY N/A 2019     Procedure: COLONOSCOPY;  Surgeon: JENI Baca MD;  Location: [facility] ENDOSCOPY    MEDHAT BIOPSY STEREO NODULE 1 SITE RIGHT (CPT=19081) Right 2024     calcs right breast  buckle clip    NEEDLE BIOPSY LEFT         age 20s    NEEDLE BIOPSY LEFT         between 20-30            Gynecological History:  Pt is a   Pt was 12 years old at time of first pregnancy.    She has cumulative breastfeeding history of 18 months.  She achieved menarche at age 12 and LMP 1/15/2024  She denies any history of hormone replacement therapy   She has history of oral contraceptive use for 1 years, last in .  She has recieved infertility treatment to achieve pregnancy.     Medications:    Medications Ordered Today   No outpatient medications have been marked as taking for the 24  encounter (Appointment) with Whitney Parada MD.            Allergies:    Allergies        Allergies   Allergen Reactions    Demerol Hcl [Meperidine] RASH            Family History:   Family History         Family History   Problem Relation Age of Onset    Breast Cancer Maternal Grandmother           50s            She is not of Ashkenazi Confucianism ancestry.     Social History:       History   Alcohol Use    Yes       Comment: Occ             History   Smoking Status    Never   Smokeless Tobacco    Never      Ms. Concepción Santana is  with 2 children. She has 4 siblings. She is currently Employed part-time     Review of Systems:  General:   The patient denies, fever, chills, night sweats, fatigue, generalized weakness, change in appetite or weight loss.     HEENT:     The patient denies eye irritation, cataracts, redness, glaucoma, yellowing of the eyes, change in vision, color blindness, or wearing contacts/glasses. The patient denies hearing loss, ringing in the ears, ear drainage, earaches, +nasal congestion, nose bleeds, +snoring, pain in mouth/throat, hoarseness, change in voice, facial trauma.     Respiratory:  The patient denies chronic cough, phlegm, hemoptysis, pleurisy/chest pain, pneumonia, asthma, wheezing, difficulty in breathing with exertion, emphysema, chronic bronchitis, shortness of breath or abnormal sound when breathing.      Cardiovascular:  There is no history of chest pain, chest pressure/discomfort, palpitations, irregular heartbeat, fainting or near-fainting, difficulty breathing when lying flat, SOB/Coughing at night, swelling of the legs or chest pain while walking.     Breasts:  See history of present illness     Gastrointestinal:     There is no history of difficulty or pain with swallowing, +reflux symptoms, vomiting, dark or bloody stools, constipation, yellowing of the skin, indigestion, nausea, change in bowel habits, diarrhea, abdominal pain or vomiting blood.       Genitourinary:  The patient denies frequent urination, needing to get up at night to urinate, urinary hesitancy or retaining urine, painful urination, urinary incontinence, decreased urine stream, blood in the urine or vaginal/penile discharge.     Skin:    The patient denies rash, itching, skin lesions, dry skin, change in skin color or change in moles.      Hematologic/Lymphatic:  The patient denies easily bruising or bleeding or persistent swollen glands or lymph nodes.      Musculoskeletal:  The patient denies muscle aches/pain, joint pain, stiff joints, neck pain, back pain or bone pain.     Neuropsychiatric:  There is no history of migraines or severe headaches, seizure/epilepsy, speech problems, coordination problems, trembling/tremors, fainting/black outs, dizziness, memory problems, loss of sensation/numbness, problems walking, weakness, tingling or burning in hands/feet. There is no history of abusive relationship, bipolar disorder, sleep disturbance, anxiety, depression or feeling of despair.     Endocrine:    There is no history of poor/slow wound healing, weight loss/gain, fertility or hormone problems, cold intolerance, thyroid disease.      Allergic/Immunologic:  There is no history of hives, hay fever, angioedema or anaphylaxis.     /79 (BP Location: Left arm, Patient Position: Sitting, Cuff Size: adult)   Pulse 79   Temp 97.7 °F (36.5 °C) (Temporal)   Resp 16   Ht 1.6 m (5' 3\")   Wt 65.6 kg (144 lb 9.6 oz)   LMP 12/18/2023   SpO2 97%   BMI 25.61 kg/m²      Physical Exam:  The patient is an alert, oriented, well-nourished and  well-developed woman who appears her stated age. Her speech patterns and movements are normal. Her affect is appropriate.     HEENT: The head is normocephalic. The neck is supple. The thyroid is not enlarged and is without palpable masses/nodules. There are no palpable masses. The trachea is in the midline. Conjunctiva are clear, non-icteric.     Chest: The  chest expands symmetrically. The lungs are clear to auscultation.     Heart: The rhythm is regular.  There are no murmurs, rubs, gallops or thrills.     Breasts:  Her breasts are symmetrical with a cup size 32I.  Right breast: The skin, nipple ,and areola appear normal. There is no skin dimpling with movement of the pectoralis. There is no nipple retraction. No nipple discharge can be elicited. The parenchyma is mildly nodular. There are no dominant masses in the breast. The axillary tail is normal.  Left breast:   The skin, nipple, and areola appear normal. There is no skin dimpling with movement of the pectoralis. There is no nipple retraction. No nipple discharge can be elicited. The parenchyma is mildly nodular. There are no dominant masses in the breast. The axillary tail is normal.     Abdomen:  The abdomen is soft, flat and non tender. The liver is not enlarged. There are no palpable masses.     Lymph Nodes:  The supraclavicular, axillary and cervical regions are free of significant lymphadenopathy.     Back: There is no vertebral column tenderness.     Skin: The skin appears normal. There are no suspicious appearing rashes or lesions.     Extremities: The extremities are without deformity, cyanosis or edema.     Impression:   Ms. Concepción Santana is a 48 year old woman presents with imaging detected right breast flat epithelial atypia.     Discussion and Plan:  I had a discussion with the Patient regarding her breast exam. On exam today I found her to be healing well since the biopsy with no other clinical findings.  I personally reviewed the recent imaging and pathology we discussed this at length.     We discussed the significance and implications of the atypia including possible association with a intraductal malignancy.  I recommended an MRI to delineate extent disease.  We discussed the patient will likely require right breast wire localized excision of the area and that the MRI will be used to  delineate the extent of required excision and exclude contralateral concerns.   I will contact her with the results when they are available to direct her plan further.  The risks and possible complications of the procedure were explained to the patient and her family and she understood and agreed to the proposed plan. She was given ample opportunity for questions and those questions were answered to her satisfaction. She has been  encouraged to contact the office with any questions or concerns prior to her next appointment.     Pre-op Diagnosis: Flat epithelial atypia (FEA) of right breast [N60.81]  Atypical lobular hyperplasia (ALH) of left breast [N60.92]    The above referenced H&P was reviewed by Whitney Parada MD on 6/3/2024, the patient was examined and no significant changes have occurred in the patient's condition since the H&P was performed.  I discussed with the patient and/or legal representative the potential benefits, risks and side effects of this procedure; the likelihood of the patient achieving goals; and potential problems that might occur during recuperation.  I discussed reasonable alternatives to the procedure, including risks, benefits and side effects related to the alternatives and risks related to not receiving this procedure.  We will proceed with procedure as planned.

## 2024-06-03 NOTE — OR PREOP
Pt returned to unit from pacu after ephedrine given and pt monitored.    Pt up to bathroom with stand by assist.  Returned to room, seen by Dr. Parada.  BP 93/71 sitting on side of cart, hr 83.  Notified radiology rn who spoke to radiologist.  States diastolic bp sitting on side of cart needs to be 100 or greater to go to radiology for wire placement.  Dr Mendez from anesthesia notified.  Will continue IV Fluids and continue to monitor.

## 2024-06-03 NOTE — IMAGING NOTE
0749 Pt  to mammography department in wheelchair, pt feels well.      Hx taken, procedure explained, questions answered.  IV patent, no redness or swelling at site    0753 Consent signed and verified     0759 Dr VEGA here, order verified and signed by all  procedural staff members    0800Time out taken,  sites marked RIGHT & LEFT breasts    0801 scanning completed by [facility] mammography technologist     SITE # 1: LEFT BREAST    0802 Chloro prep as skin prep to site.  Lidocaine  1% 10 milligrams per ml given as anesthetic affect from kit 3 ml total given.    0804 Ghiatas needle  20 gauge  X 7 cm  placed.  Pt re-imaged, procedure complete at 0808.    0809 BB marker to site wire secured to breast  strips.  A 4x4 dsg secured  over wire with tape by nurse or mammography tech this RN after images completed     SITE # 2:RIGHT BREAST    0814 pt scanned, significant pain w/positioning      0815 PT NAUSEATED, COLD PACK TO BACK OF NECK, FANNED PT    0816 BACK OF CART LOWERED    0817 PT LIGHTHEADED, SMELLING SALTS USED, PT SYNCOPAL, BACK OF CART LOWERED, IVF OPENED, SMELLING SALTS USED      0819 PT AROUSING    0821 BP 85/52 (61) HR 46, PT MORE AROUSABLE, LEGS ELEVATED    0824 BP 82/49 (60) HR 56     0826 BP 86/48 (60) HR 60    0830 855/49 (61) HR 59    0833 BP 81/45 (56 ) HR 58    0835  IVF changed to NS; pt keeping her eyes closed but states she is feeling better     0839 BP 83/50 (61) HR 62    0842 BP 87/58  HR 58    0845 BP 90/47(60) HR 61    0846 Called Aleksandra pre-recovery RN, w/update    0850 BP 83/51 (61) HR 62    0855 leg elevator removed by [employer] tech     0857 BP 85/45 (57) HR 62    0900 BP 88/50 (61) HR 61    0902 Called [facility], Dr Vega wants pt to return to her room for recovery, we will resume procedure once pt has been stable. Informed her Dr Vega unable to reach Dr Parada. She gave us OR room # for Dr Parada, Dr Vega to call her.       0903 BP 93/52 (65) HR 58; back of cart raised, legs  lowered     0906 BP 84/53 (62) HR 64; back of cart lowered, legs back on elevator    0908 BP 87/59 (66)  HR 63       0912 BP 85/55 (65)  HR 64     0913 2nd bag NS started, per Dr Burgess, anesthesiologist wants another 1.3 L NS to infuse.      0915 BP 95/53 (65 ) HR 63     0917 BP 89/44 (57) HR 66. Transporter pending, I will be with pt during transport.     0925 Transporting pt on cart back to.her room. Pt feels well.       0928 pt now in prep-recovery, connected to monitor, report to Joanne, RN, Aleksandra, RN & anesthesiologist.

## 2024-06-03 NOTE — PROCEDURES
[facility]  part of [facility]  Procedure Note    Concepción Santana Patient Status:  Outpatient    1975 MRN [id number]   Location [facility] MAMMOGRAPHY Attending Whitney Parada MD   Hosp Day # 0 PCP Shlomo Granger MD     Procedure: Left breast mammographic guided wire localization    Pre-Procedure Diagnosis:  Left breast atypical lobular hyperplasia marked with a stoplight shape biopsy clip    Post-Procedure Diagnosis: Left breast atypical lobular hyperplasia marked with a stoplight shape biopsy clip    Anesthesia:  Local    Findings:  Left breast atypical lobular hyperplasia marked with a stoplight shape biopsy clip    Specimens: n/a    Blood Loss:  minimal    Tourniquet Time: none  Complications:  patient experienced a vaso vagal episode following compression of the RIGHT breast. Please refer to the radiology nursing note for further details.  Drains:  none      Krystle Burgess MD  6/3/2024

## 2024-06-03 NOTE — ANESTHESIA POSTPROCEDURE EVALUATION
Patient: Concepción Santana    Procedure Summary       Date: 06/03/24 Room / Location: [facility] MAIN OR 08 / [facility] MAIN OR    Anesthesia Start: 1332 Anesthesia Stop: 1430    Procedure: Bilateral breast wire localized excisional biopsies (Bilateral: Breast) Diagnosis:       Flat epithelial atypia (FEA) of right breast      Atypical lobular hyperplasia (ALH) of left breast      (Flat epithelial atypia (FEA) of right breast [N60.81]Atypical lobular hyperplasia (ALH) of left breast [N60.92])    Surgeons: Whitney Parada MD Anesthesiologist: Elsy Diaz MD    Anesthesia Type: MAC ASA Status: 2            Anesthesia Type: MAC    Vitals Value Taken Time   BP 84/38 06/03/24 1430   Temp 97.8 °F (36.6 °C) 06/03/24 1430   Pulse 78 06/03/24 1430   Resp 10 06/03/24 1430   SpO2 99 % 06/03/24 1430   Vitals shown include unfiled device data.    [facility] AN Post Evaluation:   Patient Evaluated in PACU  Patient Participation: complete - patient participated  Level of Consciousness: awake  Pain Score: 0  Pain Management: adequate  Airway Patency:patent  Dental exam unchanged from preop  Yes    Cardiovascular Status: acceptable  Respiratory Status: acceptable  Postoperative Hydration acceptable      Elsy Diaz MD  6/3/2024 2:30 PM

## 2024-06-03 NOTE — IMAGING NOTE
1150 Pt  to mammography department in wheelchair with transporter escort to complete wire localization to right breast, see earlier note from wire loc left breast.     Pt has no questions.  IV patent, no redness or swelling noted at site    1152 /50 (71) HR 77    Consent already signed and verified     1200 Dr VEGA here, order verified and signed by all  procedural staff members    1200 Time out taken, site marked RIGHT Breast    1202 Scouts completed by Macymammography technologist     1203 Chloro prep as skin prep to site.  Lidocaine 1% 10 milligrams per ml given as anesthetic affect from kit  3 ml total given.    1204 bitmoviniatas needle 20 gauge  X 7 cm placed, pt feeling lightheaded, no nausea, cold pack to back of neck.  Pt re-imaged, procedure complete.    1211 BB marker to site wire, 1 breast strip placed over wire at Bb marker, removed pt from mammo machine, positioned flat    1212 /47 (63) HR 67 with legs elevated, completed steri-strips to wire    1215  BP 97/50 (64)       1219 BP 92/51 (64) HR 73, legs flat    1222 BP 86/57 (62)  HR 72    1227 BP 91/55 (66)    1230 BP 98/53 (66)  HR 73 sitting upright, images to be completed    1234 A 4x4 dsg secured over wire with tape by this RN after images completed.    1236 /47 (62)  HR 70; called report to [facility], transport requested     1244 /60 (73) HR 78     IV patent, no redness or swelling noted at site    1250 Transported pt back to her room in wheelchair with transporter, report to RN. Pt attached to monitor, feels well.

## 2024-06-03 NOTE — BRIEF OP NOTE
Pre-Operative Diagnosis: Flat epithelial atypia (FEA) of right breast [N60.81]  Atypical lobular hyperplasia (ALH) of left breast [N60.92]     Post-Operative Diagnosis: Flat epithelial atypia (FEA) of right breast [N60.81]Atypical lobular hyperplasia (ALH) of left breast [N60.92]      Procedure Performed:   Bilateral breast wire localized excisional biopsies    Surgeons and Role:     * Whitney Parada MD - Primary    Assistant(s):  Surgical Assistant.: Erica Sumner CSA     Surgical Findings: Clip seen on L xray, Calcs seen on right xray     Specimen: Bilateral lumpectomies     Estimated Blood Loss: 10cc    Whitney Parada MD  6/3/2024  2:34 PM

## 2024-06-03 NOTE — ANESTHESIA PREPROCEDURE EVALUATION
Anesthesia PreOp Note    48 year old F PMHx GERD; presenting for B/L breast wire localized excisional biopsies.    HPI:     Concepción Santana is a 48 year old female who presents for preoperative consultation requested by: Whitney Parada MD    Date of Surgery: 6/3/2024    Procedure(s):  Bilateral breast wire localized excisional biopsies  Indication: Flat epithelial atypia (FEA) of right breast [N60.81]  Atypical lobular hyperplasia (ALH) of left breast [N60.92]    Relevant Problems   No relevant active problems       NPO:  Last Liquid Consumption Date: 24  Last Liquid Consumption Time:   Last Solid Consumption Date: 24  Last Solid Consumption Time:   Last Liquid Consumption Date: 24          History Review:  Patient Active Problem List    Diagnosis Date Noted    Flat epithelial atypia (FEA) of right breast 2024    Family history of breast cancer in female 2023    Abnormal mammogram 2023    Tonsil stone 2023    Intestinal metaplasia of gastric mucosa     Actinic keratosis 2021    Excess sun exposure 2021    Solar lentigo 2021    Abdominal bloating 05/10/2021       Past Medical History:    Ectopic pregnancy (HCC)    Intestinal metaplasia of gastric mucosa    None in , repeat EGD in        Past Surgical History:   Procedure Laterality Date          Colonoscopy N/A 2019    Procedure: COLONOSCOPY;  Surgeon: JENI Baca MD;  Location: [facility] ENDOSCOPY    Jayjay biopsy stereo nodule 1 site right (cpt=19081) Right 2024    calcs right breast  buckle clip    Needle biopsy left      age 20s    Needle biopsy left      between 20-30    Needle biopsy right         No medications prior to admission.     Current Facility-Administered Medications Ordered in Epic   Medication Dose Route Frequency Provider Last Rate Last Admin    lactated ringers infusion   Intravenous Continuous Whitney Parada MD        ceFAZolin (Ancef) 2g in 10mL  IV syringe premix  2 g Intravenous Once Whitney Parada MD         No current Epic-ordered outpatient medications on file.       Allergies   Allergen Reactions    Demerol Hcl [Meperidine] RASH    Nickel OTHER (SEE COMMENTS)     Skin turns green if patient wears jewelry with nickel       Family History   Problem Relation Age of Onset    Breast Cancer Maternal Grandmother         50s     Social History     Socioeconomic History    Marital status:    Tobacco Use    Smoking status: Never     Passive exposure: Never    Smokeless tobacco: Never   Vaping Use    Vaping status: Never Used   Substance and Sexual Activity    Alcohol use: Yes     Comment: rarely    Drug use: No    Sexual activity: Yes   Other Topics Concern    Pt has a pacemaker No    Pt has a defibrillator No    Reaction to local anesthetic No       Available pre-op labs reviewed.  Lab Results   Component Value Date    URINEPREG Negative 06/03/2024             Vital Signs:  Body mass index is 25.15 kg/m².   height is 1.6 m (5' 3\") and weight is 64.4 kg (142 lb). Her oral temperature is 98.1 °F (36.7 °C). Her blood pressure is 110/65 and her pulse is 70. Her oxygen saturation is 97%.   Vitals:    05/24/24 1347 06/03/24 0644   BP:  110/65   Pulse:  70   Temp:  98.1 °F (36.7 °C)   TempSrc:  Oral   SpO2:  97%   Weight: 63.5 kg (140 lb) 64.4 kg (142 lb)   Height: 1.6 m (5' 3\")         Anesthesia Evaluation      No history of anesthetic complications   Airway   Mallampati: II  TM distance: >3 FB  Neck ROM: full  Dental - Dentition appears grossly intact     Comment: Risks of oral and dental damage including but not limited to cut/bloody lips or gums, damage to or dislodgment of native teeth or prosthetics/implants discussed preoperatively, with patient expressing understanding and desire to proceed with anesthetic. Patient denies knowledge of any additional damage/disease/weakness of teeth not otherwise documented in pre-anesthetic evaluation.       Pulmonary - negative ROS and normal exam   (-) COPD, asthma, recent URI, sleep apnea  Cardiovascular - negative ROS and normal exam  Exercise tolerance: good  (-) hypertension    Neuro/Psych - negative ROS   (-) seizures, TIA, CVA    GI/Hepatic/Renal    (+) GERD well controlled  (-) liver disease, renal disease    Endo/Other - negative ROS   (-) diabetes mellitus, hypothyroidism, hyperthyroidism  Abdominal  - normal exam                 Anesthesia Plan:   ASA:  2  Plan:   MAC  Plan Comments: Pt with vasovagal syncope in radiology during wire placement. ~3L fluids and IM ephedrine 25 mg x1 given with resolution. Will plan to proceed with case and continue to monitor closely.  Informed Consent Plan and Risks Discussed With:  Patient      I have informed Concepción Santana and/or legal guardian or family member of the nature of the anesthetic plan, benefits, risks including possible dental damage if relevant, major complications, and any alternative forms of anesthetic management.   All of the patient's questions were answered to the best of my ability. The patient desires the anesthetic management as planned.  Devonte Mendez MD  6/3/2024 8:48 AM  Present on Admission:  **None**

## 2024-06-04 NOTE — OPERATIVE REPORT
[facility]    PATIENT'S NAME: PAWAN GRAFF   ATTENDING PHYSICIAN: Whitney Parada MD   OPERATING PHYSICIAN: Whitney Parada MD   PATIENT ACCOUNT#:   [id number]    LOCATION:  [facility] 3 [facility] 10  MEDICAL RECORD #:   [id number]       YOB: 1975  ADMISSION DATE:       06/03/2024      OPERATION DATE:  06/03/2024    OPERATIVE REPORT    PREOPERATIVE DIAGNOSIS:  Flat epithelial atypia and atypical hyperplasia of the breast.  POSTOPERATIVE DIAGNOSIS:  Flat epithelial atypia and atypical hyperplasia of the breast.  PROCEDURE:  Bilateral wire-localized lumpectomies with bilateral specimen radiography.    ASSISTANT:  Erica Sumner CSA    ANESTHESIA:  Monitored anesthesia care and local.    ESTIMATED BLOOD LOSS:  10 mL.    DRAINS:  None.    COMPLICATIONS:  None.    DISPOSITION:  Stable on transfer to recovery room.    INDICATIONS:  The patient is a 48-year-old female who presents with the abnormal imaging bilaterally.  She has undergone biopsy and found to have a flat epithelial atypia and was subsequently sent for additional imaging, which included an MRI, and biopsy was also found to have additional atypia.  Given the enhancement associated with both areas, formal surgical excision to exclude coexisting pathology residual in that location was recommended.  Risks and possible complications were discussed with the patient, therefore, for a bilateral wire-localized lumpectomy including but not limited to infection, bleeding, injury to surrounding structures, possible need for reoperation.  She agreed to the proposed surgery.    OPERATIVE TECHNIQUE:  The patient was brought to the imaging suite.  She had a bilateral wire localization of the areas of concern in the breast and during this, she had a vasovagal incident.  She was deemed to be stable before proceeding to surgery.  Attention was first taken towards the left breast.  Then, 1% lidocaine with epinephrine was used to infiltrate  the skin and subcutaneous tissues at targeted incision site.  A curvilinear incision made along the areolar border with a 15 blade knife to the skin.  Wire was identified and brought in the field, and a segment of breast tissue surrounding the tip of the wire was carefully excised and oriented with a short stitch, single clip superiorly and a long stitch, double clip laterally in order to allow for appropriate pathological margin assessment and review.  It was then placed in the imaging device where specimen x-ray confirmed the presence of targeted clip in the area with adequate margins as deemed by myself, and a clip was then placed back within the cavity to assist with subsequent surveillance.  Wound was irrigated.  Hemostasis assured with electrocautery.  Deep tissue reapproximated with a running 3-0 PDS suture.  Her wound was then closed with an interrupted 3-0 Vicryl for deep layer and running 4-0 subcuticular Monocryl for skin.  Mastisol and Steri-Strips applied.  Then, 0.5% Marcaine was instilled in the cavity to assist with postoperative analgesia.  Attention was taken towards the contralateral right breast.  Then, 1% lidocaine with epinephrine was used to infiltrate the skin and subcutaneous tissue at targeted incision site.  A curvilinear incision was made in the upper outer right breast at the site of the wire with a 15 blade knife to the skin.  The wire was identified and brought in the field, and a segment of breast tissue surrounding the tip of wire was carefully excised and oriented with a short stitch, single clip superiorly and long stitch, double clip laterally in order to allow for appropriate pathological margin assessment and review.  It was then placed in the imaging device where specimen x-ray confirmed the presence of targeted calcifications.  A clip was then placed back within the cavity to assist with subsequent surveillance.  Wound was irrigated.  Hemostasis assured with electrocautery.   Deep tissue was inspected and found to have no source of bleeding.  Wound was therefore closed with interrupted 3-0 Vicryl for deep layer, running 4-0 subcuticular Monocryl for skin.  Mastisol and Steri-Strips were applied.  Then, 0.5% Marcaine was instilled in the cavity to assist with postoperative analgesia.  Sterile dressing and compression bra were placed.  Blood loss was minimal.  All counts were correct at the conclusion of procedure.  She tolerated the procedure well.  She was transferred to Recovery in stable condition.    Dictated By Whitney Parada MD  d: 06/03/2024 15:03:16  t: 06/03/2024 22:02:18  [facility] [id number]  CMG/    cc: MD Wilberto Hallman DO

## 2024-06-11 ENCOUNTER — TELEPHONE (OUTPATIENT)
Dept: SURGERY | Facility: CLINIC | Age: 49
End: 2024-06-11

## 2024-06-11 NOTE — TELEPHONE ENCOUNTER
LVMTCB to confirm post op tomorrow 6/12.  
vasu - abd pain -- tx for colitis currently with fever chills pain ho gastric bypass - bariatric consult - anlagesia re ct - depite imaging on 1/26 appendix not visulaized , no ev of int hernia - persisitant looses bloody stools - iv fgluids antiemetic reeval

## 2024-06-12 ENCOUNTER — OFFICE VISIT (OUTPATIENT)
Dept: SURGERY | Facility: CLINIC | Age: 49
End: 2024-06-12
Payer: COMMERCIAL

## 2024-06-12 VITALS
TEMPERATURE: 98 F | HEART RATE: 61 BPM | SYSTOLIC BLOOD PRESSURE: 101 MMHG | WEIGHT: 143.19 LBS | OXYGEN SATURATION: 98 % | DIASTOLIC BLOOD PRESSURE: 67 MMHG | BODY MASS INDEX: 25 KG/M2 | RESPIRATION RATE: 18 BRPM

## 2024-06-12 DIAGNOSIS — N60.99 ATYPICAL LOBULAR HYPERPLASIA (ALH) OF BREAST: ICD-10-CM

## 2024-06-12 DIAGNOSIS — D05.82 INTRADUCTAL CARCINOMA AND LOBULAR CARCINOMA IN SITU (LCIS) OF LEFT BREAST: Primary | ICD-10-CM

## 2024-06-12 PROCEDURE — 3074F SYST BP LT 130 MM HG: CPT | Performed by: SURGERY

## 2024-06-12 PROCEDURE — 99024 POSTOP FOLLOW-UP VISIT: CPT | Performed by: SURGERY

## 2024-06-12 PROCEDURE — 3078F DIAST BP <80 MM HG: CPT | Performed by: SURGERY

## 2024-06-12 RX ORDER — TAMOXIFEN CITRATE 20 MG/1
20 TABLET ORAL DAILY
Qty: 90 TABLET | Refills: 3 | Status: SHIPPED | OUTPATIENT
Start: 2024-06-12

## 2024-06-14 PROBLEM — N60.99 ATYPICAL LOBULAR HYPERPLASIA (ALH) OF BREAST: Status: ACTIVE | Noted: 2024-06-14

## 2024-06-14 PROBLEM — D05.82: Status: ACTIVE | Noted: 2024-06-14

## 2024-06-14 NOTE — PROGRESS NOTES
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    Stage: N/A    Disease Status:  Surgical treatment complete, chemoprevention counseling and high risk surveillance pending.    History:   This 49 year old woman presented  with imaging directed concern of the right breast.  The patient denies any palpable masses, nipple discharge, skin changes or axillary symptoms.  She has no personal prior history of breast disease or biopsies.  She does have a family history of breast cancer.  She had her imaging surveillance for calcifications had been followed on 2023.  She was noted to have a group of calcifications that were suspicious in the right breast upper outer quadrant as well as a stable clustered cyst in the right breast at 9:00 and an additional right breast cyst at 3:00.  She underwent a stereotactic biopsy on 2024 and was found to have focal flat epithelial atypia.  She underwent bilateral surgical excision without complication.  She denies any fevers, chills, erythema or drainage from her incisions.  She is here today for evaluation and recommendations for further therapy.        Past Medical History:    Ectopic pregnancy (HCC)    Intestinal metaplasia of gastric mucosa    None in , repeat EGD in        Past Surgical History:   Procedure Laterality Date          Colonoscopy N/A 2019    Procedure: COLONOSCOPY;  Surgeon: JENI Baca MD;  Location: [facility] ENDOSCOPY    Lumpectomy left  2024    Lumpectomy right  2024    Jayjay biopsy stereo nodule 1 site right (cpt=19081) Right 2024    calcs right breast  buckle clip    Needle biopsy left      age 20s    Needle biopsy left      between 20-30    Needle biopsy right         Gynecological History:  Pt is a   Pt was 12 years old at time of first pregnancy.    She has cumulative breastfeeding history of 18  months.  She achieved menarche at age 12 and LMP 1/15/2024  She denies any history of hormone replacement therapy   She has history of oral contraceptive use for 1 years, last in 1991.  She has recieved infertility treatment to achieve pregnancy.    Medications:     tamoxifen 20 MG Oral Tab Take 1 tablet (20 mg total) by mouth daily. 90 tablet 3       Allergies:    Allergies   Allergen Reactions    Demerol Hcl [Meperidine] RASH    Nickel OTHER (SEE COMMENTS)     Skin turns green if patient wears jewelry with nickel       Family History:   Family History   Problem Relation Age of Onset    Breast Cancer Maternal Grandmother         50s       She is not of Ashkenazi Protestant ancestry.    Social History:  History   Alcohol Use    Yes     Comment: rarely       History   Smoking Status    Never   Smokeless Tobacco    Never     Ms. Concepción Santana is  with 2 children. She has 4 siblings. She is currently Employed part-time    Review of Systems:  General:   The patient denies, fever, chills, night sweats, fatigue, generalized weakness, change in appetite or weight loss.    HEENT:     The patient denies eye irritation, cataracts, redness, glaucoma, yellowing of the eyes, change in vision, color blindness, or wearing contacts/glasses. The patient denies hearing loss, ringing in the ears, ear drainage, earaches, +nasal congestion, nose bleeds, +snoring, pain in mouth/throat, hoarseness, change in voice, facial trauma.    Respiratory:  The patient denies chronic cough, phlegm, hemoptysis, pleurisy/chest pain, pneumonia, asthma, wheezing, difficulty in breathing with exertion, emphysema, chronic bronchitis, shortness of breath or abnormal sound when breathing.     Cardiovascular:  There is no history of chest pain, chest pressure/discomfort, palpitations, irregular heartbeat, fainting or near-fainting, difficulty breathing when lying flat, SOB/Coughing at night, swelling of the legs or chest pain while walking.    Breasts:   See history of present illness    Gastrointestinal:     There is no history of difficulty or pain with swallowing, +reflux symptoms, vomiting, dark or bloody stools, constipation, yellowing of the skin, indigestion, nausea, change in bowel habits, diarrhea, abdominal pain or vomiting blood.     Genitourinary:  The patient denies frequent urination, needing to get up at night to urinate, urinary hesitancy or retaining urine, painful urination, urinary incontinence, decreased urine stream, blood in the urine or vaginal/penile discharge.    Skin:    The patient denies rash, itching, skin lesions, dry skin, change in skin color or change in moles.     Hematologic/Lymphatic:  The patient denies easily bruising or bleeding or persistent swollen glands or lymph nodes.     Musculoskeletal:  The patient denies muscle aches/pain, joint pain, stiff joints, neck pain, back pain or bone pain.    Neuropsychiatric:  There is no history of migraines or severe headaches, seizure/epilepsy, speech problems, coordination problems, trembling/tremors, fainting/black outs, dizziness, memory problems, loss of sensation/numbness, problems walking, weakness, tingling or burning in hands/feet. There is no history of abusive relationship, bipolar disorder, sleep disturbance, anxiety, depression or feeling of despair.    Endocrine:    There is no history of poor/slow wound healing, weight loss/gain, fertility or hormone problems, cold intolerance, thyroid disease.     Allergic/Immunologic:  There is no history of hives, hay fever, angioedema or anaphylaxis.    /67 (BP Location: Right arm, Patient Position: Sitting, Cuff Size: adult)   Pulse 61   Temp 98.4 °F (36.9 °C) (Temporal)   Resp 18   Wt 65 kg (143 lb 3.2 oz)   LMP 06/02/2024 (Exact Date)   SpO2 98%   BMI 25.37 kg/m²     Physical Exam:  The patient is an alert, oriented, well-nourished and  well-developed woman who appears her stated age. Her speech patterns and movements  are normal. Her affect is appropriate.    HEENT: The head is normocephalic. The neck is supple. The thyroid is not enlarged and is without palpable masses/nodules. There are no palpable masses. The trachea is in the midline. Conjunctiva are clear, non-icteric.    Chest: The chest expands symmetrically. The lungs are clear to auscultation.    Heart: The rhythm is regular.  There are no murmurs, rubs, gallops or thrills.    Breasts:  Her breasts are symmetrical with a cup size 32I.  Right breast: The skin, nipple ,and areola appear normal. There is no skin dimpling with movement of the pectoralis. There is no nipple retraction. No nipple discharge can be elicited. The parenchyma is mildly nodular. There are no dominant masses in the breast. The axillary tail is normal.  There is a well-healed incision with no signs of infection.  Left breast:   The skin, nipple, and areola appear normal. There is no skin dimpling with movement of the pectoralis. There is no nipple retraction. No nipple discharge can be elicited. The parenchyma is mildly nodular. There are no dominant masses in the breast. The axillary tail is normal.  There is a well-healed incision with no signs of infection.    Abdomen:  The abdomen is soft, flat and non tender. The liver is not enlarged. There are no palpable masses.    Lymph Nodes:  The supraclavicular, axillary and cervical regions are free of significant lymphadenopathy.    Back: There is no vertebral column tenderness.    Skin: The skin appears normal. There are no suspicious appearing rashes or lesions.    Extremities: The extremities are without deformity, cyanosis or edema.    Impression:   Ms. Concepción Santana is a 49 year old woman presents with imaging detected right breast flat epithelial atypia is post excision of bilateral pathology findings with finding of left breast LCIS and right breast FEA and ALH.    Discussion and Plan:  I had a discussion with the Patient regarding her breast  exam. On exam today I found her to be healing well since the surgery with no signs of infection.  Her pathology was reviewed by myself including finding of LCIS of the left breast and ALH in the right breast.    The significance and implications of LCIS found on core biopsy with regard to future breast cancer risk was discussed with the patient. I discussed that surigical excision of the area in the setting of classic LCIS is not routinely advocated as this is thought to be a marker of increased risk rather than a true precursor lesion. I discussed that her risk of invasive disease is conferred bilaterally and that subsequent cancers can be of both the ductal and lobular phenotype. Options with regard to management can include: (1) lifelong surveillance with the goal of detecting subsequent malignancy at an early stage; (2) chemoprevention; or (3) bilateral prophylactic mastectomy for greatest risk reduction.    Discuss the role of chemoprevention.      Discussed the following data:  --Tamoxifen 20 mg a day for 5 yrs shown to reduce the risk of breast cancer by 49% and among women with h/o atypical hyperplasia, same dose and duration was associated with a risk reduction of 86% reduction in breast cancer risk.  FDA approved the use of Tamoxifen for breast cancer risk reduction in premenopausal women at increased risk for breast cancer based upon the results of the NSABP Breast Cancer Prevention Trial in 1998. The criteria for inclusion included a 5 year risk of breast cancer greater than 1.7% based on the Aleksandra Model which in order to establish a favorable risk versus benefit profile.   --Raloxifine at 60 mg for post-menopausal women was found to be equivalent to tamoxifen for breast cancer risk reduction in the initial comparison, in the long-term f/u it appears to be less efficacious in risk reduction than tamoxifen.  Consideration of toxicity may still lead to the choice of raloxifine over tamoxifen in women with  intact uterus.     --Exemestane for post-menopausal women in a large single randomizes study in women with LCIS was found to reduce the relative incidence of invasive breast cancer by 65% at 3 yr median f/u.  --Anastrozole for post-menopausal women was found to reduce the relative incidence of breast cancer by 53% at a 5 yr median f/u.     Recommend the patient start a course of tamoxifen 20 mg daily.  I will assess her in 6 months for tolerance with a bilateral diagnostic mammogram due at that time in December 2024.  She was encouraged to contact the office with any questions or concerns prior to her next scheduled appointment.

## 2024-10-16 ENCOUNTER — LAB ENCOUNTER (OUTPATIENT)
Dept: LAB | Age: 49
End: 2024-10-16
Attending: INTERNAL MEDICINE
Payer: COMMERCIAL

## 2024-10-16 ENCOUNTER — OFFICE VISIT (OUTPATIENT)
Dept: INTERNAL MEDICINE CLINIC | Facility: CLINIC | Age: 49
End: 2024-10-16

## 2024-10-16 VITALS
TEMPERATURE: 97 F | BODY MASS INDEX: 24.98 KG/M2 | HEIGHT: 63 IN | WEIGHT: 141 LBS | SYSTOLIC BLOOD PRESSURE: 102 MMHG | HEART RATE: 81 BPM | DIASTOLIC BLOOD PRESSURE: 64 MMHG | OXYGEN SATURATION: 98 % | RESPIRATION RATE: 18 BRPM

## 2024-10-16 DIAGNOSIS — N60.99 ATYPICAL LOBULAR HYPERPLASIA (ALH) OF BREAST: ICD-10-CM

## 2024-10-16 DIAGNOSIS — D05.82 INTRADUCTAL CARCINOMA AND LOBULAR CARCINOMA IN SITU (LCIS) OF LEFT BREAST: ICD-10-CM

## 2024-10-16 DIAGNOSIS — Z12.11 COLON CANCER SCREENING: ICD-10-CM

## 2024-10-16 DIAGNOSIS — Z00.00 ANNUAL PHYSICAL EXAM: Primary | ICD-10-CM

## 2024-10-16 DIAGNOSIS — N60.81 FLAT EPITHELIAL ATYPIA (FEA) OF RIGHT BREAST: ICD-10-CM

## 2024-10-16 DIAGNOSIS — Z00.00 ANNUAL PHYSICAL EXAM: ICD-10-CM

## 2024-10-16 DIAGNOSIS — K31.A0 INTESTINAL METAPLASIA OF GASTRIC MUCOSA: ICD-10-CM

## 2024-10-16 LAB
ALBUMIN SERPL-MCNC: 4.5 G/DL (ref 3.2–4.8)
ALBUMIN/GLOB SERPL: 1.8 {RATIO} (ref 1–2)
ALP LIVER SERPL-CCNC: 61 U/L
ALT SERPL-CCNC: 12 U/L
ANION GAP SERPL CALC-SCNC: 3 MMOL/L (ref 0–18)
AST SERPL-CCNC: 19 U/L (ref ?–34)
BASOPHILS # BLD AUTO: 0.04 X10(3) UL (ref 0–0.2)
BASOPHILS NFR BLD AUTO: 0.7 %
BILIRUB SERPL-MCNC: 0.3 MG/DL (ref 0.3–1.2)
BUN BLD-MCNC: 13 MG/DL (ref 9–23)
BUN/CREAT SERPL: 19.7 (ref 10–20)
CALCIUM BLD-MCNC: 9.2 MG/DL (ref 8.7–10.4)
CHLORIDE SERPL-SCNC: 108 MMOL/L (ref 98–112)
CHOLEST SERPL-MCNC: 156 MG/DL (ref ?–200)
CO2 SERPL-SCNC: 30 MMOL/L (ref 21–32)
CREAT BLD-MCNC: 0.66 MG/DL
DEPRECATED RDW RBC AUTO: 43.6 FL (ref 35.1–46.3)
EGFRCR SERPLBLD CKD-EPI 2021: 106 ML/MIN/1.73M2 (ref 60–?)
EOSINOPHIL # BLD AUTO: 0.11 X10(3) UL (ref 0–0.7)
EOSINOPHIL NFR BLD AUTO: 1.8 %
ERYTHROCYTE [DISTWIDTH] IN BLOOD BY AUTOMATED COUNT: 12.6 % (ref 11–15)
FASTING PATIENT LIPID ANSWER: NO
FASTING STATUS PATIENT QL REPORTED: NO
GLOBULIN PLAS-MCNC: 2.5 G/DL (ref 2–3.5)
GLUCOSE BLD-MCNC: 88 MG/DL (ref 70–99)
HCT VFR BLD AUTO: 40.3 %
HDLC SERPL-MCNC: 52 MG/DL (ref 40–59)
HGB BLD-MCNC: 13.2 G/DL
IMM GRANULOCYTES # BLD AUTO: 0.02 X10(3) UL (ref 0–1)
IMM GRANULOCYTES NFR BLD: 0.3 %
LDLC SERPL CALC-MCNC: 74 MG/DL (ref ?–100)
LYMPHOCYTES # BLD AUTO: 1.63 X10(3) UL (ref 1–4)
LYMPHOCYTES NFR BLD AUTO: 27.2 %
MCH RBC QN AUTO: 31.1 PG (ref 26–34)
MCHC RBC AUTO-ENTMCNC: 32.8 G/DL (ref 31–37)
MCV RBC AUTO: 94.8 FL
MONOCYTES # BLD AUTO: 0.49 X10(3) UL (ref 0.1–1)
MONOCYTES NFR BLD AUTO: 8.2 %
NEUTROPHILS # BLD AUTO: 3.71 X10 (3) UL (ref 1.5–7.7)
NEUTROPHILS # BLD AUTO: 3.71 X10(3) UL (ref 1.5–7.7)
NEUTROPHILS NFR BLD AUTO: 61.8 %
NONHDLC SERPL-MCNC: 104 MG/DL (ref ?–130)
OSMOLALITY SERPL CALC.SUM OF ELEC: 292 MOSM/KG (ref 275–295)
PLATELET # BLD AUTO: 271 10(3)UL (ref 150–450)
POTASSIUM SERPL-SCNC: 4.1 MMOL/L (ref 3.5–5.1)
PROT SERPL-MCNC: 7 G/DL (ref 5.7–8.2)
RBC # BLD AUTO: 4.25 X10(6)UL
SODIUM SERPL-SCNC: 141 MMOL/L (ref 136–145)
TRIGL SERPL-MCNC: 180 MG/DL (ref 30–149)
TSI SER-ACNC: 1.14 MIU/ML (ref 0.55–4.78)
VLDLC SERPL CALC-MCNC: 28 MG/DL (ref 0–30)
WBC # BLD AUTO: 6 X10(3) UL (ref 4–11)

## 2024-10-16 PROCEDURE — 3008F BODY MASS INDEX DOCD: CPT | Performed by: INTERNAL MEDICINE

## 2024-10-16 PROCEDURE — 80053 COMPREHEN METABOLIC PANEL: CPT

## 2024-10-16 PROCEDURE — 85025 COMPLETE CBC W/AUTO DIFF WBC: CPT

## 2024-10-16 PROCEDURE — 3074F SYST BP LT 130 MM HG: CPT | Performed by: INTERNAL MEDICINE

## 2024-10-16 PROCEDURE — 80061 LIPID PANEL: CPT

## 2024-10-16 PROCEDURE — 3078F DIAST BP <80 MM HG: CPT | Performed by: INTERNAL MEDICINE

## 2024-10-16 PROCEDURE — 99396 PREV VISIT EST AGE 40-64: CPT | Performed by: INTERNAL MEDICINE

## 2024-10-16 PROCEDURE — 84443 ASSAY THYROID STIM HORMONE: CPT

## 2024-10-16 PROCEDURE — 36415 COLL VENOUS BLD VENIPUNCTURE: CPT

## 2024-10-16 NOTE — PATIENT INSTRUCTIONS
Prevention Guidelines, Women Ages 50 to 64  Screening tests and vaccines are an important part of managing your health. A screening test is done to find possible disorders or diseases in people who don't have any symptoms. The goal is to find a disease early so lifestyle changes can be made and you can be watched more closely to reduce the risk of disease, or to detect it early enough to treat it most effectively. Screening tests are not considered diagnostic, but are used to determine if more testing is needed. Health counseling is essential, too. Below are guidelines for these, for women ages 50 to 64. Talk with your healthcare provider to make sure you’re up to date on what you need.  Screening Who needs it How often   Type 2 diabetes or prediabetes All women beginning at age 45 and women without symptoms at any age who are overweight or obese and have 1 or more additional risk factors for diabetes. At  least every 3 years   Type 2 diabetes or prediabetes All women diagnosed with gestational diabetes Lifelong testing every 3 years   Type 2 diabetes All women with prediabetes Every year   Alcohol misuse All women in this age group At routine exams   Blood pressure All women in this age group Yearly checkup if your blood pressure is normal  Normal blood pressure is less than 120/80 mm Hg  If your blood pressure reading is higher than normal, follow the advice of your healthcare provider   Breast cancer All women at average risk in this age group Yearly mammogram should be done until age 54. At age 55, you can switch to every other year or choose to continue yearly.  All women should know the possible benefits and risks of breast cancer screening with mammograms.   Cervical cancer All women in this age group, except women who have had a complete hysterectomy Pap test every 3 years or Pap test with human papillomavirus (HPV) test every 5 years   Chlamydia Women at increased risk for infection At routine exams    Colorectal cancer All women at average risk in this age group Multiple tests are available and are used at different times. Possible tests include:  Flexible sigmoidoscopy every 5 years, or  Colonoscopy every 10 years, or  CT colonography (virtual colonoscopy) every 5 years, or  Yearly fecal occult blood test, or  Yearly fecal immunochemical test every year, or  Stool DNA test, every 3 years  If you choose a test other than a colonoscopy and have an abnormal test result, you will need to follow up with a colonoscopy. Screening advice varies among expert groups. Talk with your healthcare provider about which tests are best for you.  Some people should be screened using a different schedule because of their personal or family health history. Talk with your healthcare provider about your health history.   Depression All women in this age group At routine exams   Gonorrhea Sexually active women at increased risk for infection At routine exams   Hepatitis C Anyone at increased risk; 1 time for those born between 1945 and 1965 At routine exams   High cholesterol or triglycerides All women in this age group who are at risk for coronary artery disease At least every 5 years   HIV All women At routine exams   Lung cancer Adults age 55 to 80 who have smoked Yearly screening in smokers with 30 pack-year history of smoking or who quit within 15 years   Obesity All women in this age group At routine exams   Osteoporosis Women who are postmenopausal Ask your healthcare provider   Syphilis Women at increased risk for infection - talk with your healthcare provider At routine exams   Tuberculosis Women at increased risk for infection - talk with your healthcare provider Ask your healthcare provider   Vision All women in this age group Ask your healthcare provider   Vaccine Who needs it How often   Chickenpox (varicella) All women in this age group who have no record of this infection or vaccine 2 doses; the second dose should be  given at least 4 weeks after the first dose   Hepatitis A Women at increased risk for infection - talk with your healthcare provider 2 doses given at least 6 months apart   Hepatitis B Women at increased risk for infection - talk with your healthcare provider 3 doses over 6 months; second dose should be given 1 month after the first dose; the third dose should be given at least 2 months after the second dose and at least 4 months after the first dose   Haemophilus influenzae Type B (HIB) Women at increased risk for infection - talk with your healthcare provider 1 to 3 doses   Influenza (flu) All women in this age group Once a year   Measles, mumps, rubella (MMR) Women in this age group through their late 50s who have no record of these infections or vaccines 1 dose   Meningococcal Women at increased risk for infection - talk with your healthcare provider 1 or more doses   Pneumococcal conjugate vaccine (PCV13) and pneumococcal polysaccharide vaccine (PPSV23) Women at increased risk for infection - talk with your healthcare provider PCV13: 1 dose ages 19 to 65 (protects against 13 types of pneumococcal bacteria)  PPSV23: 1 to 2 doses through age 64, or 1 dose at 65 or older (protects against 23 types of pneumococcal bacteria)   Tetanus/diphtheria/pertussis (Td/Tdap) booster All women in this age group Td every 10 years, or a 1-time dose of Tdap instead of a Td booster after age 18, then Td every 10 years   Zoster All women ages 60 and older 1 dose   Counseling Who needs it How often   BRCA gene mutation testing for breast and ovarian cancer susceptibility Women with increased risk for having gene mutation When your risk is known   Breast cancer and chemoprevention Women at high risk for breast cancer When your risk is known   Diet and exercise Women who are overweight or obese When diagnosed, and then at routine exams   Sexually transmitted infection prevention Women at increased risk for infection - talk with your  healthcare provider At routine exams   Use of daily aspirin Women ages 55 and up in this age group who are at risk for cardiovascular health problems such as stroke When your risk is known   Use of tobacco and the health effects it can cause All women in this age group Every exam   1 American [employer] Society  Date Last Reviewed: 1/26/2016  © 1606-0920 [employer] [address] [place] [address], [place] [address]. All rights reserved. This information is not intended as a substitute for professional medical care. Always follow your healthcare professional's instructions.

## 2024-10-16 NOTE — PROGRESS NOTES
Patient ID: Concepción Santana is a 49 year old female.  Chief Complaint   Patient presents with    Physical        HISTORY OF PRESENT ILLNESS:   HPI  Patient presents for above.  Here for her annual physical.    History of abnormal mammogram.  She did have biopsies done in .  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  She is seen a breast surgeon and was placed on tamoxifen which she only was able to tolerate for 3 months.  She stopped approximately 1 month ago.  She does have upcoming diagnostic mammogram in 2024.  She does need to make an appointment with the breast surgeon still.    Previous history of intestinal metaplasia of the gastric mucosa.  She had an EGD done in  that showed metaplasia had resolved.  She does continue to take her pantoprazole due to abdominal bloating.  She has another EGD scheduled for .    Had colonoscopy in  with repeat to be done in .    Review of Systems  Ten point review of systems otherwise negative with the exception of HPI and assessment and plan.    MEDICAL HISTORY:     Past Medical History:    Ectopic pregnancy (HCC)    Intestinal metaplasia of gastric mucosa    None in , repeat EGD in        Past Surgical History:   Procedure Laterality Date          Colonoscopy N/A 2019    Procedure: COLONOSCOPY;  Surgeon: JENI Baca MD;  Location: [facility] ENDOSCOPY    Lumpectomy left  2024    Lumpectomy right  2024    Jayjay biopsy stereo nodule 1 site right (cpt=19081) Right 2024    calcs right breast  buckle clip    Needle biopsy left      age 18s    Needle biopsy left      between 20-30    Needle biopsy right           Current Outpatient Medications:     tamoxifen 20 MG Oral Tab, Take 1 tablet (20 mg total) by mouth daily. (Patient not taking: Reported on 10/16/2024), Disp: 90 tablet, Rfl:  3    Allergies:Allergies[1]    Social History     Socioeconomic History    Marital status:      Spouse name: Not on file    Number of children: Not on file    Years of education: Not on file    Highest education level: Not on file   Occupational History    Not on file   Tobacco Use    Smoking status: Never     Passive exposure: Never    Smokeless tobacco: Never   Vaping Use    Vaping status: Never Used   Substance and Sexual Activity    Alcohol use: Yes     Comment: rarely    Drug use: No    Sexual activity: Yes   Other Topics Concern    Grew up on a farm Not Asked    History of tanning Not Asked    Outdoor occupation Not Asked    Pt has a pacemaker No    Pt has a defibrillator No    Breast feeding Not Asked    Reaction to local anesthetic No   Social History Narrative    Not on file     Social Drivers of Health     Financial Resource Strain: Not on file   Food Insecurity: Not on file   Transportation Needs: Not on file   Physical Activity: Not on file   Stress: Not on file   Social Connections: Not on file   Housing Stability: Not on file           PHYSICAL EXAM:     Vitals:    10/16/24 1143   BP: 102/64   Pulse: 81   Resp: 18   Temp: 97.3 °F (36.3 °C)   TempSrc: Temporal   SpO2: 98%   Weight: 141 lb (64 kg)   Height: 5' 3\" (1.6 m)       Body mass index is 24.98 kg/m².    Physical Exam  Constitutional:       Appearance: Normal appearance.   HENT:      Head: Normocephalic and atraumatic.      Right Ear: External ear normal.      Left Ear: External ear normal.      Nose: Nose normal.      Mouth/Throat:      Mouth: Mucous membranes are moist.      Pharynx: Oropharynx is clear.   Eyes:      General: No scleral icterus.        Right eye: No discharge.         Left eye: No discharge.      Extraocular Movements: Extraocular movements intact.      Conjunctiva/sclera: Conjunctivae normal.      Pupils: Pupils are equal, round, and reactive to light.   Cardiovascular:      Rate and Rhythm: Normal rate and regular  rhythm.      Pulses: Normal pulses.      Heart sounds: Normal heart sounds. No murmur heard.     No friction rub. No gallop.   Pulmonary:      Effort: Pulmonary effort is normal. No respiratory distress.      Breath sounds: No stridor. No wheezing or rales.   Abdominal:      General: Abdomen is flat. Bowel sounds are normal. There is no distension.      Palpations: There is no mass.      Tenderness: There is no abdominal tenderness. There is no guarding.   Genitourinary:     Comments: Exam deferred to patient's gynecologist.  Musculoskeletal:         General: No swelling. Normal range of motion.      Cervical back: Normal range of motion.      Right lower leg: No edema.      Left lower leg: No edema.   Skin:     General: Skin is warm.   Neurological:      General: No focal deficit present.      Mental Status: She is alert.   Psychiatric:         Mood and Affect: Mood normal.         Behavior: Behavior normal.           ASSESSMENT/PLAN:   1. Annual physical exam  CBC With Differential With Platelet; Future  Comp Metabolic Panel (14); Future  Lipid Panel; Future  TSH W Reflex To Free T4; Future    2. Intraductal carcinoma and lobular carcinoma in situ (LCIS) of left breast  Diagnostic mammogram in December 2024.  Follow-up with breast surgeon after mammograms.  Unable to tolerate tamoxifen.    3. Flat epithelial atypia (FEA) of right breast  As per #2.    4. Atypical lobular hyperplasia (ALH) of breast  As per #2.    5. Intestinal metaplasia of gastric mucosa  Continue PPI.  EGD in 2025.    6. Colon cancer screening  GASTRO - INTERNAL    Return in about 1 year (around 10/16/2025) for Complete physical.    This note was prepared using Dragon Medical voice recognition dictation software. As a result errors may occur. When identified these errors have been corrected. While every attempt is made to correct errors during dictation discrepancies may still exist.    Shlomo Granger MD  10/16/2024       [1]   Allergies  Allergen  Reactions    Demerol Hcl [Meperidine] RASH    Nickel OTHER (SEE COMMENTS)     Skin turns green if patient wears jewelry with nickel

## 2024-12-13 ENCOUNTER — HOSPITAL ENCOUNTER (OUTPATIENT)
Dept: ULTRASOUND IMAGING | Facility: HOSPITAL | Age: 49
Discharge: HOME | End: 2024-12-13
Attending: SURGERY
Payer: COMMERCIAL

## 2024-12-13 ENCOUNTER — HOSPITAL ENCOUNTER (OUTPATIENT)
Dept: MAMMOGRAPHY | Facility: HOSPITAL | Age: 49
Discharge: HOME | End: 2024-12-13
Attending: SURGERY
Payer: COMMERCIAL

## 2024-12-13 DIAGNOSIS — D05.82 INTRADUCTAL CARCINOMA AND LOBULAR CARCINOMA IN SITU (LCIS) OF LEFT BREAST: ICD-10-CM

## 2024-12-13 DIAGNOSIS — N60.99 ATYPICAL LOBULAR HYPERPLASIA (ALH) OF BREAST: ICD-10-CM

## 2024-12-13 PROCEDURE — 77066 DX MAMMO INCL CAD BI: CPT | Performed by: SURGERY

## 2024-12-13 PROCEDURE — 77062 BREAST TOMOSYNTHESIS BI: CPT | Performed by: SURGERY

## 2024-12-13 NOTE — IMAGING NOTE
Discussed recommended by Dr. Mirza. KENDALL Mirza informed pt to follow up with Dr Parada regarding calcifications noted near surgicel site. Pt has a follow up with Dr Parada on Wed 12/18 pt to discuss at that appt.    Surgical consultation for excision recommended for right outer posterior calcifications.          Findings and recommendations were communicated to patient at the time of this examination, who expressed understanding.         BI-RADS CATEGORY:     DIAGNOSTIC CATEGORY 4 - SUSPICIOUS       RECOMMENDATIONS:   SURGICAL CONSULTATION.

## 2024-12-16 DIAGNOSIS — R92.8 ABNORMAL MAMMOGRAM: Primary | ICD-10-CM

## 2024-12-16 DIAGNOSIS — R92.1 BREAST CALCIFICATION, RIGHT: Primary | ICD-10-CM

## 2024-12-16 NOTE — IMAGING NOTE
Spoke with patient regarding bx recommendation. Patient would like to speak with  first before scheduling bx. Patient also noted she has appt with Dr. Parada this week. Call back number provided to patient to schedule bx when ready.

## 2024-12-16 NOTE — IMAGING NOTE
Discussed recommended breast biopsy with patient.  Patient is being recommended for stereotactic biopsy of the RIGHT Breast  by Dr. LOPEZ  Pt history discussed as below:  Patient of Dr. Robles. Had bilateral lumpectomy in 6/24. Currently on Tamoxifen. HAS NICKEL ALLERGY.  Had syncopal episode x2 during last soren bx.  will drive patient. Will eat. Patient to call back to schedule bx once discussed with .   Pt history of biopsy: Yes, bilateral breast bx in 2024. Left LCIS; right FEA / ALH      Family history of cancer:   no  Pt history of breast cancer:  yes; LCIS left breast dx 6/24  Hx BCP use:    short time                HRT use:    no    IVF: 4 rounds    Recommedations :            ADDENDUM:   Addendum submitted to update conclusions after review of case and imaging with breast surgeon.  Surgical recommendation is for stereotactic guided biopsy of right outer posterior calcifications. Patient will be contacted by breast imaging nurse for   scheduling.        BI-RADS CATEGORY:     DIAGNOSTIC CATEGORY 4 - SUSPICIOUS       RECOMMENDATIONS:   STEREOTACTIC BREAST BIOPSY: RIGHT BREAST  x1           Dictated by (CST): Fabienne Lopez MD on 12/16/2024 at 10:26 AM       Finalized by (CST): Fabienne Lopez MD on 12/16/2024 at 10:28 AM                 see [facility] for dictated radiology report   Reviewed pertinent patient history, family history of cancer, and patient medications    Current Outpatient Medications   Medication Sig Dispense Refill    tamoxifen 20 MG Oral Tab Take 1 tablet (20 mg total) by mouth daily. (Patient not taking: Reported on 10/16/2024) 90 tablet 3       Discussed with patient Radiology’s protocol regarding medications, as well as over-the-counter vitamin or herbal supplements, as follows:  -All herbal supplements, Vitamin E, Fish Oil    -All NSAIDs (Ibuprofen, Motrin, Advil, Aleve, or other antiinflammatory medication)  and Aspirin  81mg currently being taken    not  recommended or  prescribed by  your physician  should be held for 5  days prior to biopsy.   DENIES USAGE   -Aspirin 81 mg being taken related to a cardiac condition  or prescribed by your  physician should be held at the  direction of your physician.  Informed patient to call ordering physician for guidelines  DENIES USAGE    -Blood thinners/antiplatelet medications (Coumadin, Plavix  ect) should be held at the  direction of your physician.  Informed patient to call ordering physician for guidelines   DENIES USAGE   Reviewed Stereotactic biopsy procedure, as below.  For this procedure,   stereotactic biopsy is being performed with tomosynthesis , you will sitting upright  with your breast in compression.  Mammography imaging will be used to locate the area in question that was seen on your previous breast imaging.  The Radiologist will then inject a local numbing medication into the area and then use a needle to collect cells from the site.  A marker, or clip, will then be placed in the biopsied area.  This marker is placed so this biopsy site is able to be accurately located upon future breast imaging.  After the clip is placed,  a dressing will be placed on the biopsy site.  Additional mammography films will then be taken to assure correct placement of the marker.    Educated the patient they will be awake during this procedure and are able to drive themselves home if they wish.    Educated patient that some soreness may occur after biopsy.  Discussed use of a supportive bra and ice packs after procedure, to decrease soreness.    Discussed with patient no swimming, bathing,  hot tubs , or submerging underwater for 5 days and   until the incision is closed and healed.  Educated patient on lifting restrictions - nothing heavier than a gallon of milk for  48 hours after the procedure.      Discussed with patient that some soreness and bruising is normal after biopsy but that prolonged or increased pain and bruising should be  reported to the ordering physician.  An ace wrap will be applied over bra for added support and should be left on for 24 hours post procedure.  Reviewed results process with patient and shared that pathology results will be available within 2-3 business days of their biopsy.  Discussed results will be communicated by their ordering physician unless otherwise indicated.  Educated patient that once we receive an order from their physician our radiology secretaries will be calling them to schedule their biopsy.

## 2024-12-18 ENCOUNTER — TELEPHONE (OUTPATIENT)
Dept: ULTRASOUND IMAGING | Facility: HOSPITAL | Age: 49
End: 2024-12-18

## 2024-12-18 ENCOUNTER — TELEPHONE (OUTPATIENT)
Dept: MAMMOGRAPHY | Facility: HOSPITAL | Age: 49
End: 2024-12-18

## 2024-12-18 ENCOUNTER — OFFICE VISIT (OUTPATIENT)
Age: 49
End: 2024-12-18
Payer: COMMERCIAL

## 2024-12-18 VITALS
RESPIRATION RATE: 17 BRPM | DIASTOLIC BLOOD PRESSURE: 59 MMHG | SYSTOLIC BLOOD PRESSURE: 108 MMHG | TEMPERATURE: 97 F | WEIGHT: 142.63 LBS | BODY MASS INDEX: 25 KG/M2 | HEART RATE: 79 BPM | OXYGEN SATURATION: 98 %

## 2024-12-18 DIAGNOSIS — N60.92 ATYPICAL LOBULAR HYPERPLASIA (ALH) OF LEFT BREAST: ICD-10-CM

## 2024-12-18 DIAGNOSIS — D05.82 INTRADUCTAL CARCINOMA AND LOBULAR CARCINOMA IN SITU (LCIS) OF LEFT BREAST: Primary | ICD-10-CM

## 2024-12-18 DIAGNOSIS — N60.99 ATYPICAL LOBULAR HYPERPLASIA (ALH) OF BREAST: ICD-10-CM

## 2024-12-18 DIAGNOSIS — N60.81 FLAT EPITHELIAL ATYPIA (FEA) OF RIGHT BREAST: ICD-10-CM

## 2024-12-18 PROCEDURE — 99215 OFFICE O/P EST HI 40 MIN: CPT | Performed by: SURGERY

## 2024-12-18 PROCEDURE — 3074F SYST BP LT 130 MM HG: CPT | Performed by: SURGERY

## 2024-12-18 PROCEDURE — 3078F DIAST BP <80 MM HG: CPT | Performed by: SURGERY

## 2024-12-18 NOTE — TELEPHONE ENCOUNTER
Follow up call to [facility] Bx no answer I left a detail message to call me back at  to  assist in getting her scheduled her procedure.  Awaiting call back

## 2024-12-18 NOTE — TELEPHONE ENCOUNTER
Attempted to reach patient regarding biopsy recommendation from [place] and confirmed with Dr Carlson for a right breast 1 site stereotactic guided biopsy. Message left for patient to call back.

## 2024-12-18 NOTE — PROGRESS NOTES
Breast Surgery Surveillance Visit    Diagnosis: LCIS of left breast and ALH of right breast status post surgical excision on Gemma 3, 2024.    Stage: N/A    Disease Status:  Surgical treatment complete, chemoprevention with tamoxifen aborted after short course due to side effects, high risk surveillance ongoing.    History:   This 49 year old woman presented  with imaging directed concern of the right breast.  The patient denies any palpable masses, nipple discharge, skin changes or axillary symptoms.  She has no personal prior history of breast disease or biopsies.  She does have a family history of breast cancer.  She had her imaging surveillance for calcifications had been followed on 2023.  She was noted to have a group of calcifications that were suspicious in the right breast upper outer quadrant as well as a stable clustered cyst in the right breast at 9:00 and an additional right breast cyst at 3:00.  She underwent a stereotactic biopsy on 2024 and was found to have focal flat epithelial atypia.  She underwent bilateral surgical excision without complication.  She denies any new clinical symptoms.  She said that she tried chemoprevention with tamoxifen briefly but stopped due to side effects of headaches, brain fog, hot flashes and intolerance.  She presented for her surveillance bilateral diagnostic evaluation on 2024 and was noted to have extremely dense breast tissue with calcification in the outer posterior right breast as well as postoperative surgical changes bilaterally.  She is here today for evaluation and recommendations for further therapy.        Past Medical History:    Ectopic pregnancy (HCC)    Intestinal metaplasia of gastric mucosa    None in , repeat EGD in        Past Surgical History:   Procedure Laterality Date          Colonoscopy N/A 2019    Procedure: COLONOSCOPY;  Surgeon: JENI Baca MD;  Location: [facility] ENDOSCOPY     Lumpectomy left  2024    Lumpectomy right  2024    Jayjay biopsy stereo nodule 1 site right (cpt=19081) Right 2024    calcs right breast  buckle clip    Needle biopsy left      age 20s    Needle biopsy left      between 20-30    Needle biopsy right         Gynecological History:  Pt is a   Pt was 12 years old at time of first pregnancy.    She has cumulative breastfeeding history of 18 months.  She achieved menarche at age 12 and LMP 1/15/2024  She denies any history of hormone replacement therapy   She has history of oral contraceptive use for 1 years, last in .  She has recieved infertility treatment to achieve pregnancy.    Medications:    No outpatient medications have been marked as taking for the 24 encounter (Office Visit) with Whitney Parada MD.       Allergies:    Allergies   Allergen Reactions    Demerol Hcl [Meperidine] RASH    Nickel OTHER (SEE COMMENTS)     Skin turns green if patient wears jewelry with nickel       Family History:   Family History   Problem Relation Age of Onset    Breast Cancer Maternal Grandmother         50s       She is not of Ashkenazi Jehovah's witness ancestry.    Social History:  History   Alcohol Use    Yes     Comment: rarely       History   Smoking Status    Never   Smokeless Tobacco    Never     Ms. Concepción Santana is  with 2 children. She has 4 siblings. She is currently Employed part-time    Review of Systems:  General:   The patient denies, fever, chills, night sweats, fatigue, generalized weakness, change in appetite or weight loss.    HEENT:     The patient denies eye irritation, cataracts, redness, glaucoma, yellowing of the eyes, change in vision, color blindness, or wearing contacts/glasses. The patient denies hearing loss, ringing in the ears, ear drainage, earaches, +nasal congestion, nose bleeds, +snoring, pain in mouth/throat, hoarseness, change in voice, facial trauma.    Respiratory:  The patient denies chronic cough, phlegm,  hemoptysis, pleurisy/chest pain, pneumonia, asthma, wheezing, difficulty in breathing with exertion, emphysema, chronic bronchitis, shortness of breath or abnormal sound when breathing.     Cardiovascular:  There is no history of chest pain, chest pressure/discomfort, palpitations, irregular heartbeat, fainting or near-fainting, difficulty breathing when lying flat, SOB/Coughing at night, swelling of the legs or chest pain while walking.    Breasts:  See history of present illness    Gastrointestinal:     There is no history of difficulty or pain with swallowing, +reflux symptoms, vomiting, dark or bloody stools, constipation, yellowing of the skin, indigestion, nausea, change in bowel habits, diarrhea, abdominal pain or vomiting blood.     Genitourinary:  The patient denies frequent urination, needing to get up at night to urinate, urinary hesitancy or retaining urine, painful urination, urinary incontinence, decreased urine stream, blood in the urine or vaginal/penile discharge.    Skin:    The patient denies rash, itching, skin lesions, dry skin, change in skin color or change in moles.     Hematologic/Lymphatic:  The patient denies easily bruising or bleeding or persistent swollen glands or lymph nodes.     Musculoskeletal:  The patient denies muscle aches/pain, joint pain, stiff joints, neck pain, back pain or bone pain.    Neuropsychiatric:  There is no history of migraines or severe headaches, seizure/epilepsy, speech problems, coordination problems, trembling/tremors, fainting/black outs, dizziness, memory problems, loss of sensation/numbness, problems walking, weakness, tingling or burning in hands/feet. There is no history of abusive relationship, bipolar disorder, sleep disturbance, anxiety, depression or feeling of despair.    Endocrine:    There is no history of poor/slow wound healing, weight loss/gain, fertility or hormone problems, cold intolerance, thyroid disease.     Allergic/Immunologic:  There  is no history of hives, hay fever, angioedema or anaphylaxis.    /59 (BP Location: Right arm, Patient Position: Sitting, Cuff Size: adult)   Pulse 79   Temp 97.3 °F (36.3 °C) (Temporal)   Resp 17   Wt 64.7 kg (142 lb 9.6 oz)   LMP 12/09/2024 (Exact Date)   SpO2 98%   BMI 25.26 kg/m²     Physical Exam:  The patient is an alert, oriented, well-nourished and  well-developed woman who appears her stated age. Her speech patterns and movements are normal. Her affect is appropriate.    HEENT: The head is normocephalic. The neck is supple. The thyroid is not enlarged and is without palpable masses/nodules. There are no palpable masses. The trachea is in the midline. Conjunctiva are clear, non-icteric.    Chest: The chest expands symmetrically. The lungs are clear to auscultation.    Heart: The rhythm is regular.  There are no murmurs, rubs, gallops or thrills.    Breasts:  Her breasts are symmetrical with a cup size 32I.  Right breast: The skin, nipple ,and areola appear normal. There is no skin dimpling with movement of the pectoralis. There is no nipple retraction. No nipple discharge can be elicited. The parenchyma is mildly nodular. There are no dominant masses in the breast. The axillary tail is normal.  There is a well-healed incision with no signs of clinical concern.  Left breast:   The skin, nipple, and areola appear normal. There is no skin dimpling with movement of the pectoralis. There is no nipple retraction. No nipple discharge can be elicited. The parenchyma is mildly nodular. There are no dominant masses in the breast. The axillary tail is normal.  There is a well-healed incision with no signs of clinical concern.    Abdomen:  The abdomen is soft, flat and non tender. The liver is not enlarged. There are no palpable masses.    Lymph Nodes:  The supraclavicular, axillary and cervical regions are free of significant lymphadenopathy.    Back: There is no vertebral column tenderness.    Skin: The  skin appears normal. There are no suspicious appearing rashes or lesions.    Extremities: The extremities are without deformity, cyanosis or edema.    Impression:   Ms. Concepción Santana is a 49 year old woman presents with imaging detected right breast flat epithelial atypia is post excision of bilateral pathology findings with finding of left breast LCIS and right breast FEA and ALH.    Discussion and Plan:  I had a discussion with the Patient regarding her breast exam. On exam today I found her to be healing well since the surgery with no signs of clinical concern.  I reviewed the recent imaging which shows calcifications in the outer posterior right breast.  These are indeterminate.  I have recommended a stereotactic biopsy to best delineate the role of further surgery and/or intervention.  Though the patient was started on chemoprevention with tamoxifen she was intolerant due to side effects and therefore this was aborted.  If this patient is found to have recurrent atypical pathology we will consider excision versus risk reducing mastectomies given her intolerance of the chemoprevention.  The risks and possible complications of procedure were discussed at length and she agreed to proceed.    The significance and implications of LCIS found on core biopsy with regard to future breast cancer risk was discussed with the patient. I discussed that surigical excision of the area in the setting of classic LCIS is not routinely advocated as this is thought to be a marker of increased risk rather than a true precursor lesion. I discussed that her risk of invasive disease is conferred bilaterally and that subsequent cancers can be of both the ductal and lobular phenotype. Options with regard to management can include: (1) lifelong surveillance with the goal of detecting subsequent malignancy at an early stage; (2) chemoprevention; or (3) bilateral prophylactic mastectomy for greatest risk reduction.    I will contact her  with the results of the pathology once they are available and the plan will be dictated accordingly.  She was encouraged to contact the office with any questions or concerns prior to her next scheduled appointment.

## 2024-12-20 ENCOUNTER — TELEPHONE (OUTPATIENT)
Dept: MAMMOGRAPHY | Facility: HOSPITAL | Age: 49
End: 2024-12-20

## 2024-12-20 NOTE — TELEPHONE ENCOUNTER
Attempted to reach patient regarding screening and education for biopsy recommendation. Message left for patient to call back.

## 2024-12-23 NOTE — IMAGING NOTE
Spoke on phone with Concepción Santana regarding recommendation from [facility] radiology and confirmed with Dr Carlson for a right breast 1 site(possible 2 site) stereotactic guided biopsy for calcifications. Procedure handouts reviewed and all questions answered. Emotional and educational support given. On the day of the biopsy, pt instructed to take Tylenol 1000mg PO, eat a light meal & bring or wear a sports bra. Post biopsy care also reviewed with pt to include NO lifting more than 5lbs, no exercising or housework (limit upper body movement) for 24-48 hrs post biopsy. Patient denies blood thinners, bleeding disorders, liver disease and chemo. Pt verbalized understanding. Patient has history of passing out during localization procedure so being sent to [facility] from [facility] for prone biopsy. Patient transferred to scheduling to make appointment.

## 2025-01-06 ENCOUNTER — TELEPHONE (OUTPATIENT)
Dept: SURGERY | Facility: CLINIC | Age: 50
End: 2025-01-06

## 2025-01-06 NOTE — TELEPHONE ENCOUNTER
Returned patient call regarding her biopsy. Patient stated that there are two areas that needs to be biopsied but the radiology is stating that there is only 1 site. Informed patient that I see the orders for biopsy in the system . Informed patient that if she has any further question she can call our office .All questions patient had were answered to the best of my ability. Patient verbalized understanding.

## 2025-01-07 ENCOUNTER — HOSPITAL ENCOUNTER (OUTPATIENT)
Dept: MAMMOGRAPHY | Facility: HOSPITAL | Age: 50
Discharge: HOME | End: 2025-01-07
Attending: SURGERY
Payer: COMMERCIAL

## 2025-01-07 DIAGNOSIS — R92.8 ABNORMAL MAMMOGRAM: ICD-10-CM

## 2025-01-07 DIAGNOSIS — R92.1 BREAST CALCIFICATION, RIGHT: ICD-10-CM

## 2025-01-07 PROCEDURE — 19081 BX BREAST 1ST LESION STRTCTC: CPT

## 2025-01-07 PROCEDURE — 88305 TISSUE EXAM BY PATHOLOGIST: CPT

## 2025-01-07 PROCEDURE — 88342 IMHCHEM/IMCYTCHM 1ST ANTB: CPT

## 2025-01-07 PROCEDURE — 88341 IMHCHEM/IMCYTCHM EA ADD ANTB: CPT

## 2025-01-07 NOTE — DISCHARGE INSTRUCTIONS
Discharge Instructions-Ordering Provider  Stereotactic / Ultrasound / MRI Core Biopsy       Place an ice pack on top of the biopsy site in your bra OR the folds of the Ace wrap for 10-15 minutes every hour until bedtime for your comfort and to decrease bleeding.     Keep your supportive bra OR the Ace wrap in place for 24 hours after your biopsy. This compression decreases bleeding and breast movement for your comfort. Wear a supportive bra for the next couple days for comfort (as well as for sleeping)     No bath or shower during the first 24 hours after biopsy. After this time, you may take a bath or shower. It's okay if the steri-strips get wet but don't soak them.   No saunas, hot tubs, or swimming pools until the steri-strips fall off (5-7days).  This prevents infection and allows time for the area to completely close and heal.     DO NOT remove the steri-strips. They will fall off in 5 days to two weeks. If any type of irritation (redness, itching, OR blisters) develops in the area around the steri-strips, remove them gently. Keep the area clean and dry.     It is normal to have mild discomfort and bruising at the biopsy site.      You may take Tylenol as needed for discomfort.     DO NOT participate in strenuous activity (aerobics, heavy lifting, housework, gardening, etc.) 48 hours after your biopsy to prevent bleeding.     You will receive results from your ordering provider. Please contact them with any questions.    If you have any questions about the procedure, please contact the Breast Care Coordinator Nurse at [phone]. (M-F 7:30-4)    If you are having a MRI Breast Biopsy or an Ultrasound guided biopsy, you will be billed for the biopsy and a unilateral mammogram separately.    A 6-month or one year follow-up Diagnostic Mammogram/Ultrasound will be recommended to document stability of the biopsy site. It may be scheduled at [facility] or [facility] by calling [phone].  You will need an order for this exam from your referring physician.       5/2024

## 2025-04-07 ENCOUNTER — LAB ENCOUNTER (OUTPATIENT)
Dept: LAB | Age: 50
End: 2025-04-07
Attending: INTERNAL MEDICINE
Payer: COMMERCIAL

## 2025-04-07 ENCOUNTER — OFFICE VISIT (OUTPATIENT)
Dept: INTERNAL MEDICINE CLINIC | Facility: CLINIC | Age: 50
End: 2025-04-07

## 2025-04-07 ENCOUNTER — NURSE TRIAGE (OUTPATIENT)
Dept: INTERNAL MEDICINE CLINIC | Facility: CLINIC | Age: 50
End: 2025-04-07

## 2025-04-07 VITALS
OXYGEN SATURATION: 99 % | BODY MASS INDEX: 25.52 KG/M2 | SYSTOLIC BLOOD PRESSURE: 122 MMHG | RESPIRATION RATE: 18 BRPM | HEART RATE: 74 BPM | DIASTOLIC BLOOD PRESSURE: 80 MMHG | HEIGHT: 63 IN | TEMPERATURE: 98 F | WEIGHT: 144 LBS

## 2025-04-07 DIAGNOSIS — R20.2 NUMBNESS AND TINGLING IN LEFT ARM: ICD-10-CM

## 2025-04-07 DIAGNOSIS — R42 LIGHTHEADED: ICD-10-CM

## 2025-04-07 DIAGNOSIS — R20.2 NUMBNESS AND TINGLING IN LEFT ARM: Primary | ICD-10-CM

## 2025-04-07 DIAGNOSIS — R20.0 NUMBNESS AND TINGLING IN LEFT ARM: Primary | ICD-10-CM

## 2025-04-07 DIAGNOSIS — R20.0 NUMBNESS AND TINGLING IN LEFT ARM: ICD-10-CM

## 2025-04-07 DIAGNOSIS — R42 LIGHTHEADEDNESS: Primary | ICD-10-CM

## 2025-04-07 LAB
ATRIAL RATE: 75 BPM
BASOPHILS # BLD AUTO: 0.03 X10(3) UL (ref 0–0.2)
BASOPHILS NFR BLD AUTO: 0.6 %
D DIMER PPP FEU-MCNC: 0.3 UG/ML FEU (ref ?–0.51)
DEPRECATED RDW RBC AUTO: 44.6 FL (ref 35.1–46.3)
EOSINOPHIL # BLD AUTO: 0.13 X10(3) UL (ref 0–0.7)
EOSINOPHIL NFR BLD AUTO: 2.7 %
ERYTHROCYTE [DISTWIDTH] IN BLOOD BY AUTOMATED COUNT: 13.2 % (ref 11–15)
HCT VFR BLD AUTO: 40.5 %
HGB BLD-MCNC: 13.4 G/DL
IMM GRANULOCYTES # BLD AUTO: 0.03 X10(3) UL (ref 0–1)
IMM GRANULOCYTES NFR BLD: 0.6 %
LYMPHOCYTES # BLD AUTO: 1.18 X10(3) UL (ref 1–4)
LYMPHOCYTES NFR BLD AUTO: 24.3 %
MCH RBC QN AUTO: 30.7 PG (ref 26–34)
MCHC RBC AUTO-ENTMCNC: 33.1 G/DL (ref 31–37)
MCV RBC AUTO: 92.9 FL
MONOCYTES # BLD AUTO: 0.49 X10(3) UL (ref 0.1–1)
MONOCYTES NFR BLD AUTO: 10.1 %
NEUTROPHILS # BLD AUTO: 3 X10 (3) UL (ref 1.5–7.7)
NEUTROPHILS # BLD AUTO: 3 X10(3) UL (ref 1.5–7.7)
NEUTROPHILS NFR BLD AUTO: 61.7 %
P AXIS: 44 DEGREES
P-R INTERVAL: 174 MS
PLATELET # BLD AUTO: 247 10(3)UL (ref 150–450)
Q-T INTERVAL: 392 MS
QRS DURATION: 82 MS
QTC CALCULATION (BEZET): 437 MS
R AXIS: -31 DEGREES
RBC # BLD AUTO: 4.36 X10(6)UL
T AXIS: 30 DEGREES
TROPONIN I SERPL HS-MCNC: <3 NG/L
VENTRICULAR RATE: 75 BPM
WBC # BLD AUTO: 4.9 X10(3) UL (ref 4–11)

## 2025-04-07 PROCEDURE — 85379 FIBRIN DEGRADATION QUANT: CPT

## 2025-04-07 PROCEDURE — 3074F SYST BP LT 130 MM HG: CPT | Performed by: INTERNAL MEDICINE

## 2025-04-07 PROCEDURE — 93010 ELECTROCARDIOGRAM REPORT: CPT | Performed by: INTERNAL MEDICINE

## 2025-04-07 PROCEDURE — 84484 ASSAY OF TROPONIN QUANT: CPT

## 2025-04-07 PROCEDURE — G2211 COMPLEX E/M VISIT ADD ON: HCPCS | Performed by: INTERNAL MEDICINE

## 2025-04-07 PROCEDURE — 85025 COMPLETE CBC W/AUTO DIFF WBC: CPT

## 2025-04-07 PROCEDURE — 3079F DIAST BP 80-89 MM HG: CPT | Performed by: INTERNAL MEDICINE

## 2025-04-07 PROCEDURE — 99213 OFFICE O/P EST LOW 20 MIN: CPT | Performed by: INTERNAL MEDICINE

## 2025-04-07 PROCEDURE — 36415 COLL VENOUS BLD VENIPUNCTURE: CPT

## 2025-04-07 PROCEDURE — 3008F BODY MASS INDEX DOCD: CPT | Performed by: INTERNAL MEDICINE

## 2025-04-07 PROCEDURE — 93005 ELECTROCARDIOGRAM TRACING: CPT

## 2025-04-07 NOTE — PROGRESS NOTES
HPI:    Patient ID: Concepción Santana is a 49 year old female.    HPI  Patient is here complaining of dizziness and left arm tingling.  Typically seen by different internal medicine doctor.  Overall in good health.  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  She saw the breast surgeon oncologist for that.  Blood work last done in October of last year.  Everything looked normal.    Pt was in vacation in [place]. The bed was not comfortable. Noted tingling in her left arm. Now still tingling. Can be in hand or arm. Then today had brief moment of dizziness or lightheadedness. Pt called nurse and tehn EMT; EMT evaluated patient today- checked BP, O2, VS- all normal except elevated BP initially. The dizziness lasted for a few seconds; no definite vertigo.  The left arm issue for at least a full week. No trauma or accident. Neck is fine. No weakness. Leg is fine. Back is a little tight. No precipitating or worsening factors. Gets a little better with change in position. No prior history of this.     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 ENDOSCOPY    Lumpectomy left  2024    Lumpectomy right  2024    Jayjay biopsy stereo nodule 1 site right (cpt=19081) Right 2024    calcs right breast  buckle clip     Needle biopsy left      age 18s    Needle biopsy left      between 20-30    Needle biopsy right        Family History   Problem Relation Age of Onset    Breast Cancer Maternal Grandmother         50s      Social History     Socioeconomic History    Marital status:    Tobacco Use    Smoking status: Never     Passive exposure: Never    Smokeless tobacco: Never   Vaping Use    Vaping status: Never Used   Substance and Sexual Activity    Alcohol use: Yes     Comment: rarely    Drug use: No    Sexual activity: Yes   Other Topics Concern    Pt has a pacemaker No    Pt has a defibrillator No    Reaction to local anesthetic No     Social Drivers of Health     Food Insecurity: No Food Insecurity (4/7/2025)    NCSS - Food Insecurity     Worried About Running Out of Food in the Last Year: No     Ran Out of Food in the Last Year: No   Transportation Needs: No Transportation Needs (4/7/2025)    NCSS - Transportation     Lack of Transportation: No   Housing Stability: Not At Risk (4/7/2025)    NCSS - Housing/Utilities     Has Housing: Yes     Worried About Losing Housing: No     Unable to Get Utilities: No          Review of Systems          No current outpatient medications on file.     Allergies:Allergies[1]     PHYSICAL EXAM:   /80 (BP Location: Left arm, Patient Position: Sitting, Cuff Size: large)   Pulse 74   Temp 97.8 °F (36.6 °C) (Other)   Resp 18   Ht 5' 3\" (1.6 m)   Wt 144 lb (65.3 kg)   LMP 03/16/2025 (Exact Date)   SpO2 99%   BMI 25.51 kg/m²      Physical Exam  Constitutional:       Appearance: Normal appearance.   Cardiovascular:      Rate and Rhythm: Normal rate and regular rhythm.      Pulses: Normal pulses.      Heart sounds: Normal heart sounds.   Pulmonary:      Effort: Pulmonary effort is normal.      Breath sounds: Normal breath sounds.   Neurological:      Mental Status: She is alert.      Cranial Nerves: No cranial nerve deficit.      Sensory: Sensory deficit present.      Motor: No  weakness.      Coordination: Coordination normal.      Comments: Possible mild decrease in sensation of the left.  And forearm  Negative Tinel's sign bilaterally            Wt Readings from Last 6 Encounters:   04/07/25 144 lb (65.3 kg)   12/18/24 142 lb 9.6 oz (64.7 kg)   10/16/24 141 lb (64 kg)   06/12/24 143 lb 3.2 oz (65 kg)   06/03/24 142 lb (64.4 kg)   02/09/24 144 lb 9.6 oz (65.6 kg)             ASSESSMENT/PLAN:    1. Numbness and tingling in left arm  Patient with mild numbness and tingling in left arm for about a week now.  Started while on vacation.  No clear precipitating factors.  Better with positional change.  Doubt serious etiology.  Patient is concerned.  Paramedics had evaluated her earlier today and suggested further testing.  We will check EKG as well as a few blood test to rule out serious pathology.  Check D-dimer, CBC, troponin.  Patient wonders about stroke.  I believe it would be very unlikely based on clinical picture.  However we may consider further imaging or testing if things do not improve.  Exam is relatively benign with possibly slight decrease in sensation.  - EKG 12 Lead  - D-Dimer [E]; Future  - Troponin I (High Sensitivity) [E]; Future    2. Lightheaded  Brief episode of lightheadedness.  Check EKG and CBC as well as other blood work.  - CBC With Differential With Platelet; Future  - EKG 12 Lead         Meds This Visit:  Requested Prescriptions      No prescriptions requested or ordered in this encounter       Imaging & Referrals:  None         Nghia Schofield MD        [1]   Allergies  Allergen Reactions    Demerol Hcl [Meperidine] RASH    Nickel OTHER (SEE COMMENTS)     Skin turns green if patient wears jewelry with nickel

## 2025-04-07 NOTE — TELEPHONE ENCOUNTER
Patient stated was evaluated by medic, refused to go to emergency room. Not dizzy anymore, but left arm tingling persist. B/P 166/84, repeat b/p 133/84, pulse 80, respiration 18, pulse ox 100%.   Stated was on vacation and slept in uncomfortable bed, which may have caused some issue in her neck. Patient not sure. Speech was clear during our phone conversation and coherent.     Scheduled appointment today with Dr Schofield to evaluate further. Appt 4/7/25 @ 11 am [facility] office.       Please reply to pool: EM RN TRIAGE

## 2025-04-14 ENCOUNTER — TELEPHONE (OUTPATIENT)
Dept: INTERNAL MEDICINE CLINIC | Facility: CLINIC | Age: 50
End: 2025-04-14

## 2025-04-14 NOTE — TELEPHONE ENCOUNTER
Dr. Granger,    Please advise. Thank you    Patient calling (verified full name and date of birth).  Patient stated she has been having intermittent dizziness/lightheaded for past 2 weeks.  Was advise last week by Triage to go to ED for evaluation and patient stated she did not go  Was on vacation 2 weeks ago and feels she developed a pinched nerve to her neck/Left arm  Having intermittent numbness/tingling to left arm with continued intermittent dizziness/lightheaded    Patient only wants to be seen by you and has scheduled an appointment with you for 4-17.  Asking if you can see her sooner    Patient stated dizziness was worse 2 days ago but better yesterday. I advised ED evaluation but she refused

## 2025-04-14 NOTE — TELEPHONE ENCOUNTER
Spoke to patient and added on per note below and canceled her other appointment she had for 4/17/25

## 2025-04-15 ENCOUNTER — OFFICE VISIT (OUTPATIENT)
Dept: INTERNAL MEDICINE CLINIC | Facility: CLINIC | Age: 50
End: 2025-04-15
Payer: COMMERCIAL

## 2025-04-15 VITALS
HEART RATE: 68 BPM | DIASTOLIC BLOOD PRESSURE: 62 MMHG | WEIGHT: 145.38 LBS | TEMPERATURE: 97 F | SYSTOLIC BLOOD PRESSURE: 102 MMHG | HEIGHT: 63 IN | OXYGEN SATURATION: 98 % | RESPIRATION RATE: 16 BRPM | BODY MASS INDEX: 25.76 KG/M2

## 2025-04-15 DIAGNOSIS — R42 DIZZINESS: ICD-10-CM

## 2025-04-15 DIAGNOSIS — R20.2 NUMBNESS AND TINGLING IN LEFT ARM: Primary | ICD-10-CM

## 2025-04-15 DIAGNOSIS — R20.0 NUMBNESS AND TINGLING IN LEFT ARM: Primary | ICD-10-CM

## 2025-04-15 PROCEDURE — G2211 COMPLEX E/M VISIT ADD ON: HCPCS | Performed by: INTERNAL MEDICINE

## 2025-04-15 PROCEDURE — 99214 OFFICE O/P EST MOD 30 MIN: CPT | Performed by: INTERNAL MEDICINE

## 2025-04-15 PROCEDURE — 3078F DIAST BP <80 MM HG: CPT | Performed by: INTERNAL MEDICINE

## 2025-04-15 PROCEDURE — 3008F BODY MASS INDEX DOCD: CPT | Performed by: INTERNAL MEDICINE

## 2025-04-15 PROCEDURE — 3074F SYST BP LT 130 MM HG: CPT | Performed by: INTERNAL MEDICINE

## 2025-04-15 RX ORDER — ONDANSETRON 4 MG/1
4 TABLET, ORALLY DISINTEGRATING ORAL EVERY 8 HOURS PRN
Qty: 15 TABLET | Refills: 0 | Status: SHIPPED | OUTPATIENT
Start: 2025-04-15

## 2025-04-15 RX ORDER — MECLIZINE HCL 12.5 MG 12.5 MG/1
12.5 TABLET ORAL 3 TIMES DAILY PRN
Qty: 30 TABLET | Refills: 0 | Status: SHIPPED | OUTPATIENT
Start: 2025-04-15

## 2025-04-15 NOTE — PROGRESS NOTES
Patient ID: Concepción Santana is a 49 year old female.  Chief Complaint   Patient presents with    Dizziness     Really dizzy    Headache    Tingling     Left arm is tingling        HISTORY OF PRESENT ILLNESS:   HPI  History of Present Illness  Concepción Santana is a 49-year-old female who presents with dizziness and arm tingling.    Symptoms began over spring break, around March 30th or 31st, with tingling and numbness in her arm, initially attributed to sleeping in an uncomfortable bed. The tingling is intermittent, worsens with arm elevation, and is accompanied by neck tightness, which is relieved by massages.    On April 7th, she experienced a brief episode of dizziness while eating breakfast, with elevated blood pressure attributed to stress. An EKG was performed, and she felt fine that day. Dizziness recurred on April 11th, and by April 12th, she was bedridden with dizziness, nausea, headache, and fatigue, leading to event cancellations and avoiding driving. On April 13th, she felt slightly better but still experienced evening dizziness. On April 14th, she stayed home from work due to dizziness and felt slight throat swelling. Today, she feels very dizzy, especially after being on her feet while getting ready.    No ear pain, discharge, or hearing difficulties, but she reports a high-pitched squeaking sound in her ears at times. She has taken Claritin for suspected allergies, which provided some improvement, and Tylenol for a headache this morning. The dizziness is described as a mismatch in the speed of her visual field when moving her head or eyes.    Review of Systems  Ten point review of systems otherwise negative with the exception of HPI and assessment and plan.    MEDICAL HISTORY:   Past Medical History[1]    Past Surgical History[2]    Medications - Current[3]    Allergies:Allergies[4]    Social History     Socioeconomic History    Marital status:      Spouse name: Not on file    Number of  children: Not on file    Years of education: Not on file    Highest education level: Not on file   Occupational History    Not on file   Tobacco Use    Smoking status: Never     Passive exposure: Never    Smokeless tobacco: Never   Vaping Use    Vaping status: Never Used   Substance and Sexual Activity    Alcohol use: Yes     Comment: rarely    Drug use: No    Sexual activity: Yes   Other Topics Concern    Grew up on a farm Not Asked    History of tanning Not Asked    Outdoor occupation Not Asked    Pt has a pacemaker No    Pt has a defibrillator No    Breast feeding Not Asked    Reaction to local anesthetic No   Social History Narrative    Not on file     Social Drivers of Health     Food Insecurity: No Food Insecurity (4/7/2025)    NCSS - Food Insecurity     Worried About Running Out of Food in the Last Year: No     Ran Out of Food in the Last Year: No   Transportation Needs: No Transportation Needs (4/7/2025)    NCSS - Transportation     Lack of Transportation: No   Stress: Not on file   Housing Stability: Not At Risk (4/7/2025)    NCSS - Housing/Utilities     Has Housing: Yes     Worried About Losing Housing: No     Unable to Get Utilities: No           PHYSICAL EXAM:     Vitals:    04/15/25 0902   BP: 102/62   Pulse: 68   Resp: 16   Temp: 97.4 °F (36.3 °C)   TempSrc: Temporal   SpO2: 98%   Weight: 145 lb 6.4 oz (66 kg)   Height: 5' 3\" (1.6 m)       Body mass index is 25.76 kg/m².    Physical Exam  Constitutional:       Appearance: She is well-developed.   HENT:      Right Ear: Tympanic membrane, ear canal and external ear normal. There is no impacted cerumen.      Left Ear: Tympanic membrane, ear canal and external ear normal. There is no impacted cerumen.      Mouth/Throat:      Mouth: Mucous membranes are moist.      Pharynx: No oropharyngeal exudate.   Eyes:      General: No scleral icterus.  Cardiovascular:      Rate and Rhythm: Normal rate and regular rhythm.      Pulses: Normal pulses.      Heart sounds:  Normal heart sounds. No murmur heard.  Pulmonary:      Effort: Pulmonary effort is normal. No respiratory distress.      Breath sounds: Normal breath sounds. No stridor. No wheezing or rhonchi.   Neurological:      Mental Status: She is lethargic.           ASSESSMENT/PLAN:   1. Numbness and tingling in left arm  Likely has a pinched nerve but will hold off on treatment at this point with steroids due to exacerbation of her vertigo/dizziness.    2. Dizziness  meclizine 12.5 MG Oral Tab; Take 1 tablet (12.5 mg total) by mouth 3 (three) times daily as needed.  Dispense: 30 tablet; Refill: 0  PHYSICAL THERAPY - INTERNAL  ondansetron 4 MG Oral Tablet Dispersible; Take 1 tablet (4 mg total) by mouth every 8 (eight) hours as needed for Nausea.  Dispense: 15 tablet; Refill: 0  If no improvement after 48 hours then okay to take meclizine 25 mg 3 times a day.    Return if symptoms worsen or fail to improve.    This note was prepared using Dragon Medical voice recognition dictation software. As a result errors may occur. When identified these errors have been corrected. While every attempt is made to correct errors during dictation discrepancies may still exist.    Shlomo Granger MD  4/15/2025       [1]   Past Medical History:   Ectopic pregnancy (HCC)    Intestinal metaplasia of gastric mucosa    None in , repeat EGD in    [2]   Past Surgical History:  Procedure Laterality Date          Colonoscopy N/A 2019    Procedure: COLONOSCOPY;  Surgeon: JENI Baca MD;  Location: [facility] ENDOSCOPY    Lumpectomy left  2024    Lumpectomy right  2024    Jayjay biopsy stereo nodule 1 site right (cpt=19081) Right 2024    calcs right breast  buckle clip    Needle biopsy left      age 20s    Needle biopsy left      between 20-30    Needle biopsy right     [3]   Current Outpatient Medications:     meclizine 12.5 MG Oral Tab, Take 1 tablet (12.5 mg total) by mouth 3 (three) times daily as needed., Disp: 30  tablet, Rfl: 0    ondansetron 4 MG Oral Tablet Dispersible, Take 1 tablet (4 mg total) by mouth every 8 (eight) hours as needed for Nausea., Disp: 15 tablet, Rfl: 0  [4]   Allergies  Allergen Reactions    Demerol Hcl [Meperidine] RASH    Nickel OTHER (SEE COMMENTS)     Skin turns green if patient wears jewelry with nickel

## 2025-04-15 NOTE — PROGRESS NOTES
The following individual(s) verbally consented to be recorded using ambient AI listening technology and understand that they can each withdraw their consent to this listening technology at any point by asking the clinician to turn off or pause the recording:     Patient name: Concepción Santana   Additional names: n/a

## 2025-04-17 ENCOUNTER — PATIENT MESSAGE (OUTPATIENT)
Dept: INTERNAL MEDICINE CLINIC | Facility: CLINIC | Age: 50
End: 2025-04-17

## (undated) DEVICE — ADHESIVE LIQ 2/3ML VI MASTISOL

## (undated) DEVICE — SUT PERMA- 2-0 18IN FS NABSRB BLK 26MM 3/8

## (undated) DEVICE — Device: Brand: DEFENDO AIR/WATER/SUCTION AND BIOPSY VALVE

## (undated) DEVICE — GAMMEX® PI HYBRID SIZE 6.5, STERILE POWDER-FREE SURGICAL GLOVE, POLYISOPRENE AND NEOPRENE BLEND: Brand: GAMMEX

## (undated) DEVICE — 35 ML SYRINGE REGULAR TIP: Brand: MONOJECT

## (undated) DEVICE — CLIP SUR SM TI HRT SHP WIRE HORZ LIG SYS

## (undated) DEVICE — BRA SURG ELIZ PINK M

## (undated) DEVICE — C SECTION PACK: Brand: MEDLINE INDUSTRIES, INC.

## (undated) DEVICE — YANKAUER,FLEXIBLE HANDLE,REGLR CAPACITY: Brand: MEDLINE INDUSTRIES, INC.

## (undated) DEVICE — Device: Brand: CUSTOM PROCEDURE KIT

## (undated) DEVICE — NON-ADHERENT PAD PREPACK: Brand: TELFA

## (undated) DEVICE — REM POLYHESIVE ADULT PATIENT RETURN ELECTRODE: Brand: VALLEYLAB

## (undated) DEVICE — FORCEP RADIAL JAW 4

## (undated) DEVICE — PACK,UNIVERSAL,SPLIT,II: Brand: MEDLINE

## (undated) DEVICE — DRAPE TAPE: Brand: CONVERTORS

## (undated) DEVICE — KIT CLEAN ENDOKIT 1.1OZ GOWNX2

## (undated) DEVICE — Device: Brand: JELCO

## (undated) DEVICE — CONMED SCOPE SAVER BITE BLOCK, 20X27 MM: Brand: SCOPE SAVER

## (undated) DEVICE — SUTURE PLAIN GUT 3-0 CT-1

## (undated) DEVICE — ANTIBACTERIAL UNDYED BRAIDED (POLYGLACTIN 910), SYNTHETIC ABSORBABLE SUTURE: Brand: COATED VICRYL

## (undated) DEVICE — MINOR GENERAL: Brand: MEDLINE INDUSTRIES, INC.

## (undated) DEVICE — CLIP LIG M BLU TI HRT SHP WIRE HORZ

## (undated) DEVICE — SUTURE VICRYL 0 J340H

## (undated) DEVICE — LINE MNTR ADLT SET O2 INTMD

## (undated) DEVICE — 12 ML SYRINGE LUER-LOCK TIP: Brand: MONOJECT

## (undated) DEVICE — KIT ENDO ORCAPOD 160/180/190

## (undated) DEVICE — SOLUTION IRRIG 1000ML 0.9% NACL USP BTL

## (undated) DEVICE — KENDALL SCD EXPRESS SLEEVES, KNEE LENGTH, MEDIUM: Brand: KENDALL SCD

## (undated) DEVICE — SUT PDS II 3-0 18IN ABSRB UD L24MM PS-1

## (undated) DEVICE — SLEEVE PROTCT SUR 1 SZ ST SMS EVOL 4

## (undated) DEVICE — COVER SGL STRL LGHT HNDL BLU

## (undated) DEVICE — TRAY CATH BDX IC 14FR 2L FL

## (undated) DEVICE — SUT MCRYL 4-0 18IN PS-2 ABSRB UD 19MM 3/8 CIR

## (undated) DEVICE — ABDOMINAL PAD: Brand: CURITY

## (undated) DEVICE — 3M™ STERI-STRIP™ REINFORCED ADHESIVE SKIN CLOSURES, R1547, 1/2 IN X 4 IN (12 MM X 100 MM), 6 STRIPS/ENVELOPE: Brand: 3M™ STERI-STRIP™

## (undated) DEVICE — PAD,ABDOMINAL,8"X7.5",STERILE,LF,1/PK: Brand: MEDLINE

## (undated) DEVICE — MASK PROC W/VISOR ANTIGLARE

## (undated) DEVICE — STERILE LATEX POWDER-FREE SURGICAL GLOVESWITH NITRILE COATING: Brand: PROTEXIS

## (undated) DEVICE — MEDI-VAC NON-CONDUCTIVE SUCTION TUBING 6MM X 1.8M (6FT.) L: Brand: CARDINAL HEALTH

## (undated) DEVICE — SUTURE CHROMIC 1-0 915H

## (undated) NOTE — LETTER
INSTRUCTIONS FOR PRE-SURGICAL   ANTIMICROBIAL BATH/SHOWER    Your doctor has recommended a pre-surgical CHG (chlorhexidine gluconate) shower/bath with Betasept (also sold as Hibiclens). It reduces bacteria that can potentially cause infection.   Betasept Keep out of reach of children. If swallowed get medica help or contact [employer]. Store between 60-80 degrees F. Fabric Warning! CHG WILL STAIN YOUR FABRICS! Use with care around shower curtains, towels washcloths rugs and clothes.   Wipe

## (undated) NOTE — Clinical Note
Monthly Growth ultrasounds  Weekly NST's at 32 weeks Twice weekly testing at 36 weeks weekly NST and weekly BPP or twice weekly NST with weekly BHANU Delivery at 39 weeks

## (undated) NOTE — LETTER
6/1/2022    [address] [facility] [address]            Dear Rere Greenfield,      Our records indicate that you are due for an appointment for an EGD or Upper Endoscopy in August 2022, or sometime there after, with Arabella Marshall MD. Our doctors are booking out about 2-4 months in advance for procedures. Please call our office to schedule this appointment. Your medical well-being is important to us. If your insurance requires a referral, please call your primary care office to request one.       Thank you,      The Physicians and Staff at [facility]

## (undated) NOTE — Clinical Note
Continue care with Dr. Nori Flynn Monthly Growth ultrasounds in the third trimester Weekly NST's at 32 weeks Twice weekly testing at 36 weeks weekly NST and weekly BPP or twice weekly NST with weekly BHANU Delivery at 39 weeks

## (undated) NOTE — LETTER
[facility]  Scheduling the Birth of Your Robert De La Cruz    You are scheduled for a  delivery on 10/10/17.   You should arrive at [facility] at 11:30am.       Please note to have labs drawn within 72 hours of your scheduled proc or concerns @ [phone].

## (undated) NOTE — LETTER
[facility]  [address] [address]  Authorization for Imaging Procedure      I hereby authorize Dr. VEGA, my physician and his/her assistants (if applicable), which may include medical students, residents, and/or fellows, to perform the following procedure and administer such anesthesia as may be determined necessary by my physician: STEREOTACTIC GUIDED WITH TOMOSYNTHESIS BIOPSY OF THE RIGHT BREAST WITH CLIP PLACEMENT  on Concepción Santana.   2.  I recognize that during the procedure, unforeseen conditions may necessitate additional or different procedures than those listed above. I, therefore, further authorize and request that the above-named physician, assistants, or designees perform such procedures as are, in their judgment, necessary and desirable.    3.  My physician has discussed prior to my procedure the potential benefits, risks and side effects of this procedure; the likelihood of achieving goals; and potential problems that might occur during recuperation. They also discussed reasonable alternatives to the procedure, including risks, benefits, and side effects related to the alternatives and risks related to not receiving this procedure. I have had all my questions answered and I acknowledge that no guarantee has been made as to the result that may be obtained.    4.  Should the need arise during my procedure, which includes change of level of care prior to discharge, I also consent to the administration of blood and/or blood products. Further, I understand that despite careful testing and screening of blood or blood products by collecting agencies, I may still be subject to ill effects as a result of receiving a blood transfusion and/or blood products. The following are some, but not all, of the potential risks that can occur: fever and allergic reactions, hemolytic reactions, transmission of diseases such as Hepatitis, AIDS and  Cytomegalovirus (CMV) and fluid overload. In the event that I wish to have an autologous transfusion of my own blood, or a directed donor transfusion, I will discuss this with my physician.  Check only if Refusing Blood or Blood Products  I understand refusal of blood or blood products as deemed necessary by my physician may have serious consequences to my condition to include possible death. I hereby assume responsibility for my refusal and release the hospital, its personnel, and my physicians from any responsibility for the consequences of my refusal.   [  ] Patient Refuses Blood      5.  I authorize the use of any specimen, organs, tissues, body parts or foreign objects that may be removed from my body during the procedure for diagnosis, research or teaching purposes and their subsequent disposal by hospital authorities. I also authorize the release of specimen test results and/or written reports to my treating physician on the hospital medical staff or other referring or consulting physicians involved in my care, at the discretion of the Pathologist or my treating physician.    6.  I consent to the photographing or videotaping of the procedures to be performed, including appropriate portions of my body for medical, scientific, or educational purposes, provided my identity is not revealed by the pictures or by descriptive texts accompanying them. If the procedure has been photographed/videotaped, the physician will obtain the original picture, image, videotape or CD. The hospital will not be responsible for storage, release or maintenance of the picture, image, tape or CD.   7.  I consent to the presence of a  or observers in the operating room as deemed necessary by my physician or their designees.    8.  I recognize that in the event my procedure results in extended X-Ray/fluoroscopy time, I may develop a skin reaction.    9.  If I have a Do Not Attempt Resuscitation (DNAR) order in place,  that status will be suspended while in the operating room, procedural suite, and during the recovery period unless otherwise explicitly stated by me (or a person authorized to consent on my behalf). The performing physician or my attending physician will determine when the applicable recovery period ends for purposes of reinstating the DNAR order.  10.  I acknowledge that my physician has explained sedation/analgesia administration to me including the risk and benefits I consent to the administration of sedation/analgesia as may be necessary or desirable in the judgment of my physician.      I CERTIFY THAT I HAVE READ AND FULLY UNDERSTAND THE ABOVE CONSENT FOR THE PROCEDURE.   Signature of Patient: _____________________________________________________________  Responsible person in case of minor, unconscious: ____________________________________  Relationship to patient:  __________________________________________________________  Signature of Witness: _______________________________Date: _________Time: __________    Statement of Physician: My signature below affirms that prior to the time of the procedure, I have explained to the patient and/or her guardian, the risks and benefits involved in the proposed treatment and any reasonable alternative to the proposed treatment. I have also explained the risks and benefits involved in the refusal of the proposed treatment and have answered the patient's questions. If I have a significant financial interest in a co-management agreement or a significant financial interest in any product or implant, or other significant relationship used in the procedure/surgery, I have disclosed this and had a discussion with my patient.  Signature of Physician:   _________________________________Date:_____________Time:________    Patient Name: Concepción Santana : 1975  Printed: 2023   Medical Record #: [id number]

## (undated) NOTE — LETTER
[facility]  [address], [place], [place]    Authorization for Surgical Operation and Procedure                               I hereby authorize                                                       , MD, my physician and his/her assistants (if applicable), which may include medical students, residents, and/or fellows, to perform the following surgical operation/ procedure and administer such anesthesia as may be determined necessary by my physician: Operation/Procedure name (s) Bilateral breast wire localization on Concepción Santana   2.   I recognize that during the surgical operation/procedure, unforeseen conditions may necessitate additional or different procedures than those listed above.  I, therefore, further authorize and request that the above-named surgeon, assistants, or designees perform such procedures as are, in their judgment, necessary and desirable.    3.   My surgeon/physician has discussed prior to my surgery the potential benefits, risks and side effects of this procedure; the likelihood of achieving goals; and potential problems that might occur during recuperation.  They also discussed reasonable alternatives to the procedure, including risks, benefits, and side effects related to the alternatives and risks related to not receiving this procedure.  I have had all my questions answered and I acknowledge that no guarantee has been made as to the result that may be obtained.    4.   Should the need arise during my operation/procedure, which includes change of level of care prior to discharge, I also consent to the administration of blood and/or blood products.  Further, I understand that despite careful testing and screening of blood or blood products by collecting agencies, I may still be subject to ill effects as a result of receiving a blood transfusion and/or blood products.  The following are some, but not all, of the potential risks that can occur: fever and allergic  reactions, hemolytic reactions, transmission of diseases such as Hepatitis, AIDS and Cytomegalovirus (CMV) and fluid overload.  In the event that I wish to have an autologous transfusion of my own blood, or a directed donor transfusion, I will discuss this with my physician.  Check only if Refusing Blood or Blood Products  I understand refusal of blood or blood products as deemed necessary by my physician may have serious consequences to my condition to include possible death. I hereby assume responsibility for my refusal and release the hospital, its personnel, and my physicians from any responsibility for the consequences of my refusal.    o  Refuse   5.   I authorize the use of any specimen, organs, tissues, body parts or foreign objects that may be removed from my body during the operation/procedure for diagnosis, research or teaching purposes and their subsequent disposal by hospital authorities.  I also authorize the release of specimen test results and/or written reports to my treating physician on the hospital medical staff or other referring or consulting physicians involved in my care, at the discretion of the Pathologist or my treating physician.    6.   I consent to the photographing or videotaping of the operations or procedures to be performed, including appropriate portions of my body for medical, scientific, or educational purposes, provided my identity is not revealed by the pictures or by descriptive texts accompanying them.  If the procedure has been photographed/videotaped, the surgeon will obtain the original picture, image, videotape or CD.  The hospital will not be responsible for storage, release or maintenance of the picture, image, tape or CD.    7.   I consent to the presence of a  or observers in the operating room as deemed necessary by my physician or their designees.    8.   I recognize that in the event my procedure results in extended X-Ray/fluoroscopy time, I may  develop a skin reaction.    9. If I have a Do Not Attempt Resuscitation (DNAR) order in place, that status will be suspended while in the operating room, procedural suite, and during the recovery period unless otherwise explicitly stated by me (or a person authorized to consent on my behalf). The surgeon or my attending physician will determine when the applicable recovery period ends for purposes of reinstating the DNAR order.  10. Patients having a sterilization procedure: I understand that if the procedure is successful the results will be permanent and it will therefore be impossible for me to inseminate, conceive, or bear children.  I also understand that the procedure is intended to result in sterility, although the result has not been guaranteed.   11. I acknowledge that my physician has explained sedation/analgesia administration to me including the risk and benefits I consent to the administration of sedation/analgesia as may be necessary or desirable in the judgment of my physician.    I CERTIFY THAT I HAVE READ AND FULLY UNDERSTAND THE ABOVE CONSENT TO OPERATION and/or OTHER PROCEDURE.     ____________________________________  _________________________________        ______________________________  Signature of Patient    Signature of Responsible Person                Printed Name of Responsible Person                                      ____________________________________  _____________________________                ________________________________  Signature of Witness        Date  Time         Relationship to Patient    STATEMENT OF PHYSICIAN My signature below affirms that prior to the time of the procedure; I have explained to the patient and/or his/her legal representative, the risks and benefits involved in the proposed treatment and any reasonable alternative to the proposed treatment. I have also explained the risks and benefits involved in refusal of the proposed treatment and alternatives to  the proposed treatment and have answered the patient's questions. If I have a significant financial interest in a co-management agreement or a significant financial interest in any product or implant, or other significant relationship used in this procedure/surgery, I have disclosed this and had a discussion with my patient.     _____________________________________________________              _____________________________  (Signature of Physician)                                                                                         (Date)                                   (Time)  Patient Name: Concepción PULIDO Esther      : 1975      Printed: 2024     Medical Record #: [id number]                                      Page 1 of 1

## (undated) NOTE — Clinical Note
Continue care with  [address] [facility] Monthly Growth ultrasounds in the third trimester Weekly NST's at 32 weeks Twice weekly testing at 36 weeks weekly NST and weekly BPP or twice weekly NST with weekly BHANU Delivery at 39 weeks

## (undated) NOTE — LETTER
03/22/21        Srini Giron  [address] [place] [address]      Dear Cristel Yanes,    [facility] records indicate that you have outstanding lab work and or testing that was ordered for you and has not yet been completed:  Orders Placed This Encounter

## (undated) NOTE — IP AVS SNAPSHOT
[address]  [address] [facility] [phone]                Discharge Summary   5/31/2017    Eder Felix           Admission Information        Provider Department    5/31/2017 Neli Whalen MD [facility] Materna US [address] AND DTL FTL ANT SLG(CPT=76811)  5/31/2017 (Approximate) 5/31/2018    Scheduling Instructions:     To schedule a test at any [facility], [employer] Scheduling at [phone], Monday through Friday between 7: Patient [address] to help you get signed up for insurance coverage. Patient [address] is a Federal Navigator program that can help with your Affordable Care Act coverage, as well as all types of Medicaid plans.   To get signed up and covere

## (undated) NOTE — LETTER
12/16/21        Julienne Ayon  [address]      Dear Magda Fields,    [address] records indicate that you have outstanding lab work and or testing that was ordered for you and has not yet been completed:  [address] (14)   Cristine Kim

## (undated) NOTE — IP AVS SNAPSHOT
[address]  602 [facility] [phone]                Discharge Summary   6/23/2017    Sowmya Decker           Admission Information        Provider Department    6/23/2017 Jenn Wellington MD [facility] Materna [address] FTL CV 2D W/WO M MODE(CPT=76825)  6/23/2017 (Approximate) 6/23/2018    Scheduling Instructions:     To schedule a test at any [facility], [employer] Scheduling at [phone], Monday through Friday between 7 Medicaid plans. To get signed up and covered, please call [phone] and ask to get set up for an insurance coverage that is in-network with [facility]. Sharad     Sign up for [employer], your secure online medical record.   [employer]

## (undated) NOTE — LETTER
9/18/2017              Eugenia [address] [place] [address] [place] [address]         To whom it may concern,    Julia Berger is receiving prenatal care in our practice and is approx 37 weeks pregnant.   I am requesting that her gym membership be kumar

## (undated) NOTE — MR AVS SNAPSHOT
After Visit Summary   12/9/2019    Dinora Hernandez    MRN: [id number]           Visit Information     Date & Time  12/9/2019 10:40 AM Provider  Rhoda Peterson MD [address] [place] [address], [address] [place] [address],[address], [facility].  Phone between 7:30am to 6pm and on Saturday between [facility] ([facility])        [place]          It is the patient's responsibility to check with and follow their insurance company' VIDEO VISITS  Visit face-to-face with a [facility] physician or REA  using your mobile device or computer   using [employer]      e-VISITS  Communicate with a [facility] physician or REA  online.   The physician will respond and provide a treatment plan within a few joya

## (undated) NOTE — LETTER
[facility]  [address], [place], [place]    Authorization for Surgical Operation and Procedure                               I hereby authorize Whitney Parada MD, my physician and his/her assistants (if applicable), which may include medical students, residents, and/or fellows, to perform the following surgical operation/ procedure and administer such anesthesia as may be determined necessary by my physician: Operation/Procedure name (s) Bilateral breast  excisional biopsies on Concepción Santana   2.   I recognize that during the surgical operation/procedure, unforeseen conditions may necessitate additional or different procedures than those listed above.  I, therefore, further authorize and request that the above-named surgeon, assistants, or designees perform such procedures as are, in their judgment, necessary and desirable.    3.   My surgeon/physician has discussed prior to my surgery the potential benefits, risks and side effects of this procedure; the likelihood of achieving goals; and potential problems that might occur during recuperation.  They also discussed reasonable alternatives to the procedure, including risks, benefits, and side effects related to the alternatives and risks related to not receiving this procedure.  I have had all my questions answered and I acknowledge that no guarantee has been made as to the result that may be obtained.    4.   Should the need arise during my operation/procedure, which includes change of level of care prior to discharge, I also consent to the administration of blood and/or blood products.  Further, I understand that despite careful testing and screening of blood or blood products by collecting agencies, I may still be subject to ill effects as a result of receiving a blood transfusion and/or blood products.  The following are some, but not all, of the potential risks that can occur: fever and allergic reactions, hemolytic reactions,  transmission of diseases such as Hepatitis, AIDS and Cytomegalovirus (CMV) and fluid overload.  In the event that I wish to have an autologous transfusion of my own blood, or a directed donor transfusion, I will discuss this with my physician.  Check only if Refusing Blood or Blood Products  I understand refusal of blood or blood products as deemed necessary by my physician may have serious consequences to my condition to include possible death. I hereby assume responsibility for my refusal and release the hospital, its personnel, and my physicians from any responsibility for the consequences of my refusal.    o  Refuse   5.   I authorize the use of any specimen, organs, tissues, body parts or foreign objects that may be removed from my body during the operation/procedure for diagnosis, research or teaching purposes and their subsequent disposal by hospital authorities.  I also authorize the release of specimen test results and/or written reports to my treating physician on the hospital medical staff or other referring or consulting physicians involved in my care, at the discretion of the Pathologist or my treating physician.    6.   I consent to the photographing or videotaping of the operations or procedures to be performed, including appropriate portions of my body for medical, scientific, or educational purposes, provided my identity is not revealed by the pictures or by descriptive texts accompanying them.  If the procedure has been photographed/videotaped, the surgeon will obtain the original picture, image, videotape or CD.  The hospital will not be responsible for storage, release or maintenance of the picture, image, tape or CD.    7.   I consent to the presence of a  or observers in the operating room as deemed necessary by my physician or their designees.    8.   I recognize that in the event my procedure results in extended X-Ray/fluoroscopy time, I may develop a skin reaction.    9. If  I have a Do Not Attempt Resuscitation (DNAR) order in place, that status will be suspended while in the operating room, procedural suite, and during the recovery period unless otherwise explicitly stated by me (or a person authorized to consent on my behalf). The surgeon or my attending physician will determine when the applicable recovery period ends for purposes of reinstating the DNAR order.  10. Patients having a sterilization procedure: I understand that if the procedure is successful the results will be permanent and it will therefore be impossible for me to inseminate, conceive, or bear children.  I also understand that the procedure is intended to result in sterility, although the result has not been guaranteed.   11. I acknowledge that my physician has explained sedation/analgesia administration to me including the risk and benefits I consent to the administration of sedation/analgesia as may be necessary or desirable in the judgment of my physician.    I CERTIFY THAT I HAVE READ AND FULLY UNDERSTAND THE ABOVE CONSENT TO OPERATION and/or OTHER PROCEDURE.     ____________________________________  _________________________________        ______________________________  Signature of Patient    Signature of Responsible Person                Printed Name of Responsible Person                                      ____________________________________  _____________________________                ________________________________  Signature of Witness        Date  Time         Relationship to Patient    STATEMENT OF PHYSICIAN My signature below affirms that prior to the time of the procedure; I have explained to the patient and/or his/her legal representative, the risks and benefits involved in the proposed treatment and any reasonable alternative to the proposed treatment. I have also explained the risks and benefits involved in refusal of the proposed treatment and alternatives to the proposed treatment and have  answered the patient's questions. If I have a significant financial interest in a co-management agreement or a significant financial interest in any product or implant, or other significant relationship used in this procedure/surgery, I have disclosed this and had a discussion with my patient.     _____________________________________________________              _____________________________  (Signature of Physician)                                                                                         (Date)                                   (Time)  Patient Name: Concepción PULIDO Esther      : 1975      Printed: 2024     Medical Record #: [id number]                                      Page 1 of 1

## (undated) NOTE — LETTER
[facility]  [address], [place], [place]  Authorization for Surgical Operation and Procedure                                                                                           I hereby authorize DR. VEGA, my physician and his/her assistants (if applicable), which may include medical students, residents, and/or fellows, to perform the following surgical operation/ procedure and administer such anesthesia as may be determined necessary by my physician: MAGNETIC RESONANCE IMAGE GUIDED LEFT BREAST BIOPSY WITH CLIP PLACEMENT on Concepción Santana   2.   I recognize that during the surgical operation/procedure, unforeseen conditions may necessitate additional or different procedures than those listed above.  I, therefore, further authorize and request that the above-named surgeon, assistants, or designees perform such procedures as are, in their judgment, necessary and desirable.    3.   My surgeon/physician has discussed prior to my surgery the potential benefits, risks and side effects of this procedure; the likelihood of achieving goals; and potential problems that might occur during recuperation.  They also discussed reasonable alternatives to the procedure, including risks, benefits, and side effects related to the alternatives and risks related to not receiving this procedure.  I have had all my questions answered and I acknowledge that no guarantee has been made as to the result that may be obtained.    4.   Should the need arise during my operation/procedure, which includes change of level of care prior to discharge, I also consent to the administration of blood and/or blood products.  Further, I understand that despite careful testing and screening of blood or blood products by collecting agencies, I may still be subject to ill effects as a result of receiving a blood transfusion and/or blood products.  The following are some, but not all, of the potential risks that can occur:  fever and allergic reactions, hemolytic reactions, transmission of diseases such as Hepatitis, AIDS and Cytomegalovirus (CMV) and fluid overload.  In the event that I wish to have an autologous transfusion of my own blood, or a directed donor transfusion, I will discuss this with my physician.  Check only if Refusing Blood or Blood Products  I understand refusal of blood or blood products as deemed necessary by my physician may have serious consequences to my condition to include possible death. I hereby assume responsibility for my refusal and release the hospital, its personnel, and my physicians from any responsibility for the consequences of my refusal.    o  Refuse   5.   I authorize the use of any specimen, organs, tissues, body parts or foreign objects that may be removed from my body during the operation/procedure for diagnosis, research or teaching purposes and their subsequent disposal by hospital authorities.  I also authorize the release of specimen test results and/or written reports to my treating physician on the hospital medical staff or other referring or consulting physicians involved in my care, at the discretion of the Pathologist or my treating physician.    6.   I consent to the photographing or videotaping of the operations or procedures to be performed, including appropriate portions of my body for medical, scientific, or educational purposes, provided my identity is not revealed by the pictures or by descriptive texts accompanying them.  If the procedure has been photographed/videotaped, the surgeon will obtain the original picture, image, videotape or CD.  The hospital will not be responsible for storage, release or maintenance of the picture, image, tape or CD.    7.   I consent to the presence of a  or observers in the operating room as deemed necessary by my physician or their designees.    8.   I recognize that in the event my procedure results in extended  X-Ray/fluoroscopy time, I may develop a skin reaction.    9. If I have a Do Not Attempt Resuscitation (DNAR) order in place, that status will be suspended while in the operating room, procedural suite, and during the recovery period unless otherwise explicitly stated by me (or a person authorized to consent on my behalf). The surgeon or my attending physician will determine when the applicable recovery period ends for purposes of reinstating the DNAR order.  10. Patients having a sterilization procedure: I understand that if the procedure is successful the results will be permanent and it will therefore be impossible for me to inseminate, conceive, or bear children.  I also understand that the procedure is intended to result in sterility, although the result has not been guaranteed.   11. I acknowledge that my physician has explained sedation/analgesia administration to me including the risk and benefits I consent to the administration of sedation/analgesia as may be necessary or desirable in the judgment of my physician.    I CERTIFY THAT I HAVE READ AND FULLY UNDERSTAND THE ABOVE CONSENT TO OPERATION and/or OTHER PROCEDURE.     _________________________________________ _________________________________     ___________________________________  Signature of Patient     Signature of Responsible Person                   Printed Name of Responsible Person                              _________________________________________ ______________________________        ___________________________________  Signature of Witness         Date  Time         Relationship to Patient    STATEMENT OF PHYSICIAN My signature below affirms that prior to the time of the procedure; I have explained to the patient and/or his/her legal representative, the risks and benefits involved in the proposed treatment and any reasonable alternative to the proposed treatment. I have also explained the risks and benefits involved in refusal of the  proposed treatment and alternatives to the proposed treatment and have answered the patient's questions. If I have a significant financial interest in a co-management agreement or a significant financial interest in any product or implant, or other significant relationship used in this procedure/surgery, I have disclosed this and had a discussion with my patient.     _______________________________________________________________ _____________________________  (Signature of Physician)                                                                                         (Date)                                   (Time)  Patient Name: Concepción Solanoir    : 1975   Printed: 2024      Medical Record #: [id number]                                              Page 1 of 1

## (undated) NOTE — IP AVS SNAPSHOT
Patient Demographics     Address Phone    [phone] [address] [place] [phone] [facility])      Emergency Contact(s)     Name Relation Home Work Alicia Newton [phone]      Allergies as of 6/23/2017  Reviewed on: 6/23/2017 9/18/2017 11:00 AM Yady Jung [address] [facility]  [address]

## (undated) NOTE — Clinical Note
Monthly Growth ultrasounds  Weekly NST's  Twice weekly testing at 36 weeks weekly NST and weekly BPP or twice weekly NST with weekly BHANU

## (undated) NOTE — LETTER
VACCINE ADMINISTRATION RECORD  PARENT / GUARDIAN APPROVAL  Date: 2017  Vaccine administered to: Jonny Noble     : 1975    MRN: [id number]    A copy of the appropriate Centers for Disease Control and Prevention Vaccine Information statement

## (undated) NOTE — LETTER
[facility]  [address]  [phone]     Patient: Chuck Pichardo   YOB: 1975   Date of Visit: 11/21/2020     Dear Employer,        November 21, 2020    At [facility], we are taking special pr Persons infected with SARS-CoV-2 who never develop COVID-19 symptoms may discontinue isolation and other precautions 10 days after the date of their first positive RT-PCR test for SARS-CoV-2 RNA.     Persons who are asymptomatic but have been exposed, CDC r

## (undated) NOTE — MR AVS SNAPSHOT
[place]  [address]  [phone]               Thank you for choosing us for your health care visit with Nurse.   We are glad to serve you and happy to provide you with this summary of your vis Expires: 7/10/2017 12:41 PM    If you have questions, you can call [phone] to talk to our [facility] Staff. Remember, [employer] is NOT to be used for urgent needs. For medical emergencies, dial 911.         Educational Information     [employer]

## (undated) NOTE — IP AVS SNAPSHOT
Patient Demographics     Address Phone    [address]  [address] [phone] [facility])      Emergency Contact(s)     Name Relation Home Work Alicia Maine [phone]      Allergies as of 5/31/2017  Reviewed on: 5/31/2 8/21/2017 11:00 AM Yady Lawson [facility] EM [facility]    9/18/2017 11:00 AM Yady Lawson Maternal Fetal Medicine EM [address] [place] [address]